# Patient Record
Sex: MALE | Race: BLACK OR AFRICAN AMERICAN | NOT HISPANIC OR LATINO | Employment: OTHER | URBAN - METROPOLITAN AREA
[De-identification: names, ages, dates, MRNs, and addresses within clinical notes are randomized per-mention and may not be internally consistent; named-entity substitution may affect disease eponyms.]

---

## 2017-01-05 ENCOUNTER — HOSPITAL ENCOUNTER (EMERGENCY)
Facility: OTHER | Age: 62
Discharge: HOME | End: 2017-01-05
Attending: EMERGENCY MEDICINE
Payer: COMMERCIAL

## 2017-01-05 VITALS
RESPIRATION RATE: 24 BRPM | DIASTOLIC BLOOD PRESSURE: 62 MMHG | BODY MASS INDEX: 47.56 KG/M2 | WEIGHT: 303 LBS | HEIGHT: 67 IN | TEMPERATURE: 97 F | HEART RATE: 102 BPM | SYSTOLIC BLOOD PRESSURE: 130 MMHG | OXYGEN SATURATION: 96 %

## 2017-01-05 DIAGNOSIS — R10.32 LLQ ABDOMINAL PAIN: ICD-10-CM

## 2017-01-05 DIAGNOSIS — K59.01 CONSTIPATION BY DELAYED COLONIC TRANSIT: ICD-10-CM

## 2017-01-05 DIAGNOSIS — R33.9 URINARY RETENTION: Primary | ICD-10-CM

## 2017-01-05 LAB
ANION GAP SERPL CALC-SCNC: 12 MMOL/L
ANISOCYTOSIS BLD QL SMEAR: SLIGHT
BASOPHILS # BLD AUTO: 0.02 K/UL
BASOPHILS NFR BLD: 0.2 %
BILIRUB UR QL STRIP: NEGATIVE
BUN SERPL-MCNC: 16 MG/DL
CALCIUM SERPL-MCNC: 9.9 MG/DL
CHLORIDE SERPL-SCNC: 109 MMOL/L
CLARITY UR: CLEAR
CO2 SERPL-SCNC: 19 MMOL/L
COLOR UR: YELLOW
CREAT SERPL-MCNC: 1.2 MG/DL
DIFFERENTIAL METHOD: ABNORMAL
EOSINOPHIL # BLD AUTO: 0.1 K/UL
EOSINOPHIL NFR BLD: 0.6 %
ERYTHROCYTE [DISTWIDTH] IN BLOOD BY AUTOMATED COUNT: 15.4 %
EST. GFR  (AFRICAN AMERICAN): >60 ML/MIN/1.73 M^2
EST. GFR  (NON AFRICAN AMERICAN): >60 ML/MIN/1.73 M^2
GIANT PLATELETS BLD QL SMEAR: PRESENT
GLUCOSE SERPL-MCNC: 74 MG/DL
GLUCOSE UR QL STRIP: NEGATIVE
HCT VFR BLD AUTO: 35.2 %
HGB BLD-MCNC: 11.3 G/DL
HGB UR QL STRIP: NEGATIVE
KETONES UR QL STRIP: NEGATIVE
LEUKOCYTE ESTERASE UR QL STRIP: NEGATIVE
LYMPHOCYTES # BLD AUTO: 1 K/UL
LYMPHOCYTES NFR BLD: 8.7 %
MCH RBC QN AUTO: 27 PG
MCHC RBC AUTO-ENTMCNC: 32.1 %
MCV RBC AUTO: 84 FL
MONOCYTES # BLD AUTO: 0.7 K/UL
MONOCYTES NFR BLD: 6.5 %
NEUTROPHILS # BLD AUTO: 9.1 K/UL
NEUTROPHILS NFR BLD: 84 %
NITRITE UR QL STRIP: NEGATIVE
PH UR STRIP: 6 [PH] (ref 5–8)
PLATELET # BLD AUTO: 358 K/UL
PLATELET BLD QL SMEAR: ABNORMAL
PMV BLD AUTO: 9.2 FL
POLYCHROMASIA BLD QL SMEAR: ABNORMAL
POTASSIUM SERPL-SCNC: 4.5 MMOL/L
PROT UR QL STRIP: NEGATIVE
RBC # BLD AUTO: 4.18 M/UL
SODIUM SERPL-SCNC: 140 MMOL/L
SP GR UR STRIP: 1.02 (ref 1–1.03)
URN SPEC COLLECT METH UR: NORMAL
UROBILINOGEN UR STRIP-ACNC: NEGATIVE EU/DL
WBC # BLD AUTO: 10.89 K/UL

## 2017-01-05 PROCEDURE — 85007 BL SMEAR W/DIFF WBC COUNT: CPT

## 2017-01-05 PROCEDURE — 99284 EMERGENCY DEPT VISIT MOD MDM: CPT | Mod: 25

## 2017-01-05 PROCEDURE — 51702 INSERT TEMP BLADDER CATH: CPT

## 2017-01-05 PROCEDURE — 81003 URINALYSIS AUTO W/O SCOPE: CPT

## 2017-01-05 PROCEDURE — 80048 BASIC METABOLIC PNL TOTAL CA: CPT

## 2017-01-05 PROCEDURE — 85027 COMPLETE CBC AUTOMATED: CPT

## 2017-01-05 RX ORDER — DIAZEPAM 10 MG/1
10 TABLET ORAL EVERY 6 HOURS PRN
COMMUNITY
End: 2017-05-31

## 2017-01-05 RX ORDER — AMLODIPINE BESYLATE 10 MG/1
10 TABLET ORAL DAILY
COMMUNITY
End: 2021-05-13

## 2017-01-05 RX ORDER — DOCUSATE SODIUM 100 MG/1
100 CAPSULE, LIQUID FILLED ORAL 2 TIMES DAILY PRN
COMMUNITY
End: 2017-01-05

## 2017-01-05 RX ORDER — DOCUSATE SODIUM 100 MG/1
100 CAPSULE, LIQUID FILLED ORAL 2 TIMES DAILY PRN
Qty: 60 CAPSULE | Refills: 0 | Status: SHIPPED | OUTPATIENT
Start: 2017-01-05 | End: 2021-05-13

## 2017-01-05 RX ORDER — POLYETHYLENE GLYCOL 3350 17 G/17G
17 POWDER, FOR SOLUTION ORAL DAILY
Qty: 119 G | Refills: 0 | Status: SHIPPED | OUTPATIENT
Start: 2017-01-05 | End: 2017-05-31

## 2017-01-05 RX ORDER — ALLOPURINOL 300 MG/1
300 TABLET ORAL DAILY
COMMUNITY
End: 2021-06-03 | Stop reason: SDUPTHER

## 2017-01-05 RX ORDER — METFORMIN HYDROCHLORIDE 500 MG/1
500 TABLET ORAL 2 TIMES DAILY WITH MEALS
COMMUNITY
End: 2022-02-08

## 2017-01-05 NOTE — DISCHARGE INSTRUCTIONS
Constipation (Adult)  Constipation means that you have bowel movements that are less frequent than usual. Stools often become very hard and difficult to pass.  Constipation is very common. At some point in life it affects almost everyone. Since everyone's bowel habits are different, what is constipation to one person may not be to another. Your healthcare provider may do tests to diagnose constipation. It depends on what he or she finds when evaluating you.    Symptoms of constipation include:  · Abdominal pain  · Bloating  · Vomiting  · Painful bowel movements  · Itching, swelling, bleeding, or pain around the anus  Causes  Constipation can have many causes. These include:  · Diet low in fiber  · Too much dairy  · Not drinking enough liquids  · Lack of exercise or physical activity. This is especially true for older adults.  · Changes in lifestyle or daily routine, including pregnancy, aging, work, and travel  · Frequent use or misuse of laxatives  · Ignoring the urge to have a bowel movement or delaying it until later  · Medicines, such as certain prescription pain medicines, iron supplements, antacids, certain antidepressants, and calcium supplements  · Diseases like irritable bowel syndrome, bowel obstructions, stroke, diabetes, thyroid disease, Parkinson disease, hemorrhoids, and colon cancer  Complications  Potential complications of constipation can include:  · Hemorrhoids  · Rectal bleeding from hemorrhoids or anal fissures (skin tears)  · Hernias  · Dependency on laxatives  · Chronic constipation  · Fecal impaction  · Bowel obstruction or perforation  Home care  All treatment should be done after talking with your healthcare provider. This is especially true if you have another medical problems, are taking prescription medicines, or are an older adult. Treatment most often involves lifestyle changes. You may also need medicines. Your healthcare provider will tell you which will work best for you. Follow  the advice below to help avoid this problem in the future.  Lifestyle changes  These lifestyle changes can help prevent constipation:  · Diet. Eat a high-fiber diet, with fresh fruit and vegetables, and reduce dairy intake, meats, and processed foods  · Fluids. It's important to get enough fluids each day. Drink plenty of water when you eat more fiber. If you are on diet that limits the amount of fluid you can have, talk about this with your healthcare provider.  · Regular exercise. Check with your healthcare provider first.  Medications  Take any medicines as directed. Some laxatives are safe to use only every now and then. Others can be taken on a regular basis. Talk with your doctor or pharmacist if you have questions.  Prescription pain medicines can cause constipation. If you are taking this kind of medicine, ask your healthcare provider if you should also take a stool softener.  Medicines you may take to treat constipation include:  · Fiber supplements  · Stool softeners  · Laxatives  · Enemas  · Rectal suppositories  Follow-up care  Follow up with your healthcare provider if symptoms don't get better in the next few days. You may need to have more tests or see a specialist.  Call 911  Call 911 if any of these occur:  · Trouble breathing  · Stiff, rigid abdomen that is severely painful to touch  · Confusion  · Fainting or loss of consciousness  · Rapid heart rate  · Chest pain  When to seek medical advice  Call your healthcare provider right away if any of these occur:  · Fever over 100.4°F (38°C)  · Failure to resume normal bowel movements  · Pain in your abdomen or back gets worse  · Nausea or vomiting  · Swelling in your abdomen  · Blood in the stool  · Black, tarry stool  · Involuntary weight loss  · Weakness  © 2250-9956 [employer]. [address] [place] [address], [place] [address]. All rights reserved. This information is not intended as a substitute for professional medical care. Always follow  your healthcare professional's instructions.          Treating Constipation  Constipation is a common and often uncomfortable problem. Constipation means you have bowel movements fewer than 3 times per week, or strain to pass hard, dry stool. It can last a short time. Or it can be a problem that never seems to go away. The good news is that it can often be treated and controlled.    Eat more fiber  One of the best ways to help treat constipation is to increase your fiber intake. You can do this either through diet or by using fiber supplements. Fiber (in whole grains, fruits, and vegetables) adds bulk and absorbs water to soften the stool. This helps the stool pass through the colon more easily. When you increase your fiber intake, do it slowly to avoid side effects such as bloating. Also increase the amount of water that you drink. Eating more of the following foods can add fiber to your diet.  · High-fiber cereals  · Whole grains, bran, and brown rice  · Vegetables such as carrots, broccoli, and greens  · Fresh fruits (especially apples, pears, and dried fruits like raisins and apricots)  · Nuts and legumes (especially beans such as lentils, kidney beans, and lima beans)  Get physically active  Exercise helps improve the working of your colon which helps ease constipation. Try to get some physical activity every day. If you havent been active for a while, talk to your healthcare provider before starting again.  Laxatives  Your healthcare provider may suggest an over-the-counter product to help ease your constipation. He or she may suggest the use of bulk-forming agents or laxatives. The use of laxatives, if used as directed, is common and safe. Follow directions carefully when using them. See your healthcare provider for new-onset constipation, or long-term constipation, to rule out other causes such as medicines or thyroid disease.  © 1035-5183 [employer]. [address] [place] [address], [place]  [address]. All rights reserved. This information is not intended as a substitute for professional medical care. Always follow your healthcare professional's instructions.          Urinary Retention (Male)  Urinary retention is the medical term for difficulty or inability to pass urine, even though your bladder is full.  Causes  The most common cause of urinary retention in men is the bladder outlet being blocked. This can be due to an enlarged prostate gland or a bladder infection. Certain medicines can also cause this problem. This condition is more likely to occur as men get older.    This condition is treated by insertion of a catheter into the bladder to drain the urine. This provides immediate relief. The catheter may need to remain in place for a few days to prevent a recurrence. The catheter has a balloon on the tip which was inflated after insertion. This prevents the catheter from falling out.  Symptoms  Common symptoms of urinary retention include:  · Pain (not experienced by everyone)  · Frequent urination  · Feeling that the bladder is still full after urinating  · Incontinence (not being able to control the release of urine)  · Swollen abdomen  Treatment  This condition is treated by inserting a tube (catheter) into the bladder to drain the urine. This provides immediate relief. The catheter may need to stay in place for a few days. The catheter has a balloon on the tip, which is inflated after insertion. This prevents the catheter from falling out.  Home care  · If you were given antibiotics, take them until they are used up, or your healthcare provider tells you to stop. It is important to finish the antibiotics even though you feel better. This is to make sure your infection has cleared.  · If a catheter was left in place, it is important to keep bacteria from getting into the collection bag. Do not disconnect the catheter from the collection bag.  · Use a leg band to secure the drainage tube, so it does  not pull on the catheter. Drain the collection bag when it becomes full using the drain spout at the bottom of the bag.  · Do not pull on or try to remove your catheter. This will injure your urethra. The catheter must be removed by a healthcare provider.  Follow-up care  Follow up with your healthcare provider, or as advised.  If a catheter was left in place, it can usually be removed within 3 to 7 days. Some conditions require the catheter to stay in longer. Your healthcare provider will tell you when to return to have the catheter removed.  When to seek medical advice  Call your healthcare provider right away if any of these occur:  · Fever of 100.4ºF (38ºC) or higher, or as directed by your healthcare provider  · Bladder or lower-abdominal pain or fullness  · Abdominal swelling, nausea, vomiting, or back pain  · Blood or urine leakage around the catheter  · Bloody urine coming from the catheter (if a new symptom)  · Weakness, dizziness, or fainting  · Confusion or change in usual level of alertness  · If a catheter was left in place, return if:  ¨ Catheter falls out  ¨ Catheter stops draining for 6 hours  © 3868-7956 [employer], [employer]. [address] [place] [address]. All rights reserved. This information is not intended as a substitute for professional medical care. Always follow your healthcare professional's instructions.

## 2017-01-05 NOTE — ED NOTES
PT presents to ED c/o LLQ abdominal pain x 2-3 weeks. Pt has back surgery mid- December 2016 [place]. Pt has been having constipation and bladder distention as well. Pt last BM was yesterday. Last void was 10AM this AM but pt states he does not feel like he emptied his bladder.  Pt has trouble walking or moving independently due to abdominal pain. Denies NVD, SOB, or CP. Pt AAOx4 and appropriate at this time. Respirations even and unlabored. No acute distress noted. Pt reports pain 10/10. MD notified. Awaiting further orders. Pt updated on POC. Bed is locked and in lowest position with side rails up x2. Call bell within reach and pt oriented to use of call bell. Pt on continuous cardiac monitoring, continuous pulse ox, and continuous BP cuff. Will continue to monitor.

## 2017-01-05 NOTE — ED NOTES
Leg bag applied to pt. Pt and family instructed on how to care for catheter. Pt tolerating leg bag well.

## 2017-01-05 NOTE — ED AVS SNAPSHOT
[facility]  [address]  [facility] [address]               Jono Gonzalez   2017 12:01 PM   ED    Description:  Male : 1955   Department:  [facility]           Your Care was Coordinated By:     Provider Role From To    Dany Steven II, MD Attending Provider 17 1222 --      Reason for Visit     Abdominal Pain           Diagnoses this Visit        Comments    Urinary retention    -  Primary     LLQ abdominal pain         Constipation by delayed colonic transit           ED Disposition     None           To Do List           Follow-up Information     Follow up with Thomas Wei MD In 3 days.    Specialty:  Internal Medicine    Contact information:    [address] [facility]  [address]  [facility] [id number] [phone]          Follow up with Octavio Martinez MD. Schedule an appointment as soon as possible for a visit in 3 days.    Specialty:  Urology    Contact information:    [address] [facility]  [address]  [facility] [address]  [phone]         These Medications        Disp Refills Start End    docusate sodium (COLACE) 100 MG capsule 60 capsule 0 2017     Take 1 capsule (100 mg total) by mouth 2 (two) times daily as needed for Constipation. - Oral    Pharmacy: [facility] [address] Ph #: [phone]       polyethylene glycol (GLYCOLAX) 17 gram/dose powder 119 g 0 2017     Take 17 g by mouth once daily. - Oral    Pharmacy: [facility] [address] AT [facility] Ph #: [phone]         [facility]     [facility] -  Assistance  Registered nurses in the [facility] provide clinical advisement, health education, appointment booking, and other advisory services.  Call for this free service at [phone].             Medications           Message  regarding Medications     Verify the changes and/or additions to your medication regime listed below are the same as discussed with your clinician today.  If any of these changes or additions are incorrect, please notify your healthcare provider.        START taking these NEW medications        Refills    docusate sodium (COLACE) 100 MG capsule 0    Sig: Take 1 capsule (100 mg total) by mouth 2 (two) times daily as needed for Constipation.    Class: Print    Route: Oral    polyethylene glycol (GLYCOLAX) 17 gram/dose powder 0    Sig: Take 17 g by mouth once daily.    Class: Print    Route: Oral      STOP taking these medications     ibuprofen (ADVIL,MOTRIN) 800 MG tablet Take 1 tablet (800 mg total) by mouth every 6 (six) hours as needed for Pain.    benazepril (LOTENSIN) 40 MG tablet Take 40 mg by mouth once daily.    levocetirizine (XYZAL) 5 MG tablet Take 5 mg by mouth every evening.    meloxicam (MOBIC) 7.5 MG tablet Take 7.5 mg by mouth once daily.    colchicine 0.6 mg tablet Take 1 tablet (0.6 mg total) by mouth once daily.           Verify that the below list of medications is an accurate representation of the medications you are currently taking.  If none reported, the list may be blank. If incorrect, please contact your healthcare provider. Carry this list with you in case of emergency.           Current Medications     allopurinol (ZYLOPRIM) 300 MG tablet Take 300 mg by mouth once daily.    amlodipine (NORVASC) 10 MG tablet Take 10 mg by mouth once daily.    diazePAM (VALIUM) 10 MG Tab Take 10 mg by mouth every 6 (six) hours as needed (for anxiety).     furosemide (LASIX) 40 MG tablet Take 40 mg by mouth daily as needed (for fluid retention or swelling).     metformin (GLUCOPHAGE) 500 MG tablet Take 500 mg by mouth 2 (two) times daily with meals.    olmesartan (BENICAR) 40 MG tablet Take 40 mg by mouth once daily.    oxycodone-acetaminophen (PERCOCET) 5-325 mg per tablet Take 1 tablet by mouth every 6 (six)  "hours as needed for Pain.    docusate sodium (COLACE) 100 MG capsule Take 1 capsule (100 mg total) by mouth 2 (two) times daily as needed for Constipation.    polyethylene glycol (GLYCOLAX) 17 gram/dose powder Take 17 g by mouth once daily.           Clinical Reference Information           Your Vitals Were     BP Pulse Temp Resp Height Weight    142/75 102 97.2 °F (36.2 °C) (Oral) 29 5' 7" (1.702 m) 137.4 kg (303 lb)    SpO2 BMI             95% 47.46 kg/m2         Allergies as of 1/5/2017     No Known Allergies      Immunizations Administered on Date of Encounter - 1/5/2017     None      ED Micro, Lab, POCT     Start Ordered       Status Ordering Provider    01/05/17 1528 01/05/17 1527  Basic metabolic panel  STAT      Final result     01/05/17 1528 01/05/17 1527  CBC auto differential  STAT      Final result     01/05/17 1308 01/05/17 1307  Urinalysis - Cath.  Once      Final result       ED Imaging Orders     Start Ordered       Status Ordering Provider    01/05/17 1307 01/05/17 1307  X-Ray Abdomen Flat And Erect  1 time imaging      Final result         Discharge Instructions         Constipation (Adult)  Constipation means that you have bowel movements that are less frequent than usual. Stools often become very hard and difficult to pass.  Constipation is very common. At some point in life it affects almost everyone. Since everyone's bowel habits are different, what is constipation to one person may not be to another. Your healthcare provider may do tests to diagnose constipation. It depends on what he or she finds when evaluating you.    Symptoms of constipation include:  · Abdominal pain  · Bloating  · Vomiting  · Painful bowel movements  · Itching, swelling, bleeding, or pain around the anus  Causes  Constipation can have many causes. These include:  · Diet low in fiber  · Too much dairy  · Not drinking enough liquids  · Lack of exercise or physical activity. This is especially true for older " adults.  · Changes in lifestyle or daily routine, including pregnancy, aging, work, and travel  · Frequent use or misuse of laxatives  · Ignoring the urge to have a bowel movement or delaying it until later  · Medicines, such as certain prescription pain medicines, iron supplements, antacids, certain antidepressants, and calcium supplements  · Diseases like irritable bowel syndrome, bowel obstructions, stroke, diabetes, thyroid disease, Parkinson disease, hemorrhoids, and colon cancer  Complications  Potential complications of constipation can include:  · Hemorrhoids  · Rectal bleeding from hemorrhoids or anal fissures (skin tears)  · Hernias  · Dependency on laxatives  · Chronic constipation  · Fecal impaction  · Bowel obstruction or perforation  Home care  All treatment should be done after talking with your healthcare provider. This is especially true if you have another medical problems, are taking prescription medicines, or are an older adult. Treatment most often involves lifestyle changes. You may also need medicines. Your healthcare provider will tell you which will work best for you. Follow the advice below to help avoid this problem in the future.  Lifestyle changes  These lifestyle changes can help prevent constipation:  · Diet. Eat a high-fiber diet, with fresh fruit and vegetables, and reduce dairy intake, meats, and processed foods  · Fluids. It's important to get enough fluids each day. Drink plenty of water when you eat more fiber. If you are on diet that limits the amount of fluid you can have, talk about this with your healthcare provider.  · Regular exercise. Check with your healthcare provider first.  Medications  Take any medicines as directed. Some laxatives are safe to use only every now and then. Others can be taken on a regular basis. Talk with your doctor or pharmacist if you have questions.  Prescription pain medicines can cause constipation. If you are taking this kind of medicine, ask  your healthcare provider if you should also take a stool softener.  Medicines you may take to treat constipation include:  · Fiber supplements  · Stool softeners  · Laxatives  · Enemas  · Rectal suppositories  Follow-up care  Follow up with your healthcare provider if symptoms don't get better in the next few days. You may need to have more tests or see a specialist.  Call 911  Call 911 if any of these occur:  · Trouble breathing  · Stiff, rigid abdomen that is severely painful to touch  · Confusion  · Fainting or loss of consciousness  · Rapid heart rate  · Chest pain  When to seek medical advice  Call your healthcare provider right away if any of these occur:  · Fever over 100.4°F (38°C)  · Failure to resume normal bowel movements  · Pain in your abdomen or back gets worse  · Nausea or vomiting  · Swelling in your abdomen  · Blood in the stool  · Black, tarry stool  · Involuntary weight loss  · Weakness  © 5558-9655 [employer]. [address] [place] [address], [place] [address]. All rights reserved. This information is not intended as a substitute for professional medical care. Always follow your healthcare professional's instructions.          Treating Constipation  Constipation is a common and often uncomfortable problem. Constipation means you have bowel movements fewer than 3 times per week, or strain to pass hard, dry stool. It can last a short time. Or it can be a problem that never seems to go away. The good news is that it can often be treated and controlled.    Eat more fiber  One of the best ways to help treat constipation is to increase your fiber intake. You can do this either through diet or by using fiber supplements. Fiber (in whole grains, fruits, and vegetables) adds bulk and absorbs water to soften the stool. This helps the stool pass through the colon more easily. When you increase your fiber intake, do it slowly to avoid side effects such as bloating. Also increase the amount of water  that you drink. Eating more of the following foods can add fiber to your diet.  · High-fiber cereals  · Whole grains, bran, and brown rice  · Vegetables such as carrots, broccoli, and greens  · Fresh fruits (especially apples, pears, and dried fruits like raisins and apricots)  · Nuts and legumes (especially beans such as lentils, kidney beans, and lima beans)  Get physically active  Exercise helps improve the working of your colon which helps ease constipation. Try to get some physical activity every day. If you havent been active for a while, talk to your healthcare provider before starting again.  Laxatives  Your healthcare provider may suggest an over-the-counter product to help ease your constipation. He or she may suggest the use of bulk-forming agents or laxatives. The use of laxatives, if used as directed, is common and safe. Follow directions carefully when using them. See your healthcare provider for new-onset constipation, or long-term constipation, to rule out other causes such as medicines or thyroid disease.  © 6634-8457 [employer]. [address] [place] [address]. All rights reserved. This information is not intended as a substitute for professional medical care. Always follow your healthcare professional's instructions.          Urinary Retention (Male)  Urinary retention is the medical term for difficulty or inability to pass urine, even though your bladder is full.  Causes  The most common cause of urinary retention in men is the bladder outlet being blocked. This can be due to an enlarged prostate gland or a bladder infection. Certain medicines can also cause this problem. This condition is more likely to occur as men get older.    This condition is treated by insertion of a catheter into the bladder to drain the urine. This provides immediate relief. The catheter may need to remain in place for a few days to prevent a recurrence. The catheter has a balloon on the tip  which was inflated after insertion. This prevents the catheter from falling out.  Symptoms  Common symptoms of urinary retention include:  · Pain (not experienced by everyone)  · Frequent urination  · Feeling that the bladder is still full after urinating  · Incontinence (not being able to control the release of urine)  · Swollen abdomen  Treatment  This condition is treated by inserting a tube (catheter) into the bladder to drain the urine. This provides immediate relief. The catheter may need to stay in place for a few days. The catheter has a balloon on the tip, which is inflated after insertion. This prevents the catheter from falling out.  Home care  · If you were given antibiotics, take them until they are used up, or your healthcare provider tells you to stop. It is important to finish the antibiotics even though you feel better. This is to make sure your infection has cleared.  · If a catheter was left in place, it is important to keep bacteria from getting into the collection bag. Do not disconnect the catheter from the collection bag.  · Use a leg band to secure the drainage tube, so it does not pull on the catheter. Drain the collection bag when it becomes full using the drain spout at the bottom of the bag.  · Do not pull on or try to remove your catheter. This will injure your urethra. The catheter must be removed by a healthcare provider.  Follow-up care  Follow up with your healthcare provider, or as advised.  If a catheter was left in place, it can usually be removed within 3 to 7 days. Some conditions require the catheter to stay in longer. Your healthcare provider will tell you when to return to have the catheter removed.  When to seek medical advice  Call your healthcare provider right away if any of these occur:  · Fever of 100.4ºF (38ºC) or higher, or as directed by your healthcare provider  · Bladder or lower-abdominal pain or fullness  · Abdominal swelling, nausea, vomiting, or back  pain  · Blood or urine leakage around the catheter  · Bloody urine coming from the catheter (if a new symptom)  · Weakness, dizziness, or fainting  · Confusion or change in usual level of alertness  · If a catheter was left in place, return if:  ¨ Catheter falls out  ¨ Catheter stops draining for 6 hours  © 6675-5495 [employer]. [address] [place] [address]. All rights reserved. This information is not intended as a substitute for professional medical care. Always follow your healthcare professional's instructions.           [facility] complies with applicable Federal civil rights laws and does not discriminate on the basis of race, color, national origin, age, disability, or sex.        Language Assistance Services     ATTENTION: Language assistance services are available, free of charge. Please call [phone].      ATENCIÓN: Si habla español, tiene a payne disposición servicios gratuitos de asistencia lingüística. Llame al [phone].     CHÚ Ý: N?u b?n nói Ti?ng Vi?t, có các d?ch v? h? tr? ngôn ng? mi?n phí dành cho b?n. G?i s? [phone].

## 2017-01-05 NOTE — ED PROVIDER NOTES
Encounter Date: 1/5/2017    SCRIBE #1 NOTE: I, Mateus Reinaldo, am scribing for, and in the presence of,  Dr. Steven. I have scribed the entire note.       History     Chief Complaint   Patient presents with    Abdominal Pain     LLQ abd pain starting after back sx on 12/14. last BM yesterday. reports needing enemas and laxatives since sx. pain worse with deep breaths.     Review of patient's allergies indicates:  No Known Allergies  HPI Comments: Time seen by provider: 12:55 PM    This is a 62 y.o. male with a PMHx of HTN and diabetes mellitus who presents with complaint of abdominal pain which began atraumatically two weeks ago. He describes his abdominal pain as being constant in his LLQ. His wife notes that he underwent a CT of his abdomen 17 days ago which did no reveal anything. He denies any symptoms of nausea, vomiting, blood in stool, constipation, or shortness of breath.    The history is provided by the patient and the spouse.     Past Medical History   Diagnosis Date    Diabetes mellitus     Gout attack     Hypertension      No past medical history pertinent negatives.  Past Surgical History   Procedure Laterality Date    Lumbar fusion       X 2    Back surgery       No family history on file.  Social History   Substance Use Topics    Smoking status: Never Smoker    Smokeless tobacco: None    Alcohol use No     Review of Systems   Constitutional: Negative for chills and fever.   HENT: Negative for congestion and facial swelling.    Respiratory: Negative for cough, chest tightness and shortness of breath.    Cardiovascular: Negative for chest pain.   Gastrointestinal: Positive for abdominal pain. Negative for blood in stool, constipation, diarrhea, nausea and vomiting.   Endocrine: Negative for polyuria.   Genitourinary: Negative for decreased urine volume, difficulty urinating, discharge, dysuria, hematuria, penile pain and penile swelling.   Musculoskeletal: Negative for back pain, myalgias and  neck pain.   Skin: Negative for rash.   Neurological: Negative for headaches.       Physical Exam   Initial Vitals   BP Pulse Resp Temp SpO2   01/05/17 1142 01/05/17 1142 01/05/17 1142 01/05/17 1142 01/05/17 1142   117/58 117 18 97.2 °F (36.2 °C) 94 %     Physical Exam    Nursing note and vitals reviewed.  Constitutional: He appears well-developed and well-nourished. He is not diaphoretic. No distress.   Patient is obese and uncomfortable appearing.   HENT:   Head: Normocephalic and atraumatic.   Right Ear: External ear normal.   Left Ear: External ear normal.   Mouth/Throat: Oropharynx is clear and moist.   Eyes: Conjunctivae and EOM are normal. Pupils are equal, round, and reactive to light. Right eye exhibits no discharge. Left eye exhibits no discharge.   Neck: Normal range of motion.   Cardiovascular: Normal rate, regular rhythm, normal heart sounds and intact distal pulses. Exam reveals no gallop and no friction rub.    No murmur heard.  Pulmonary/Chest: Breath sounds normal. No respiratory distress. He has no wheezes. He has no rhonchi. He has no rales.   Abdominal: Soft. There is tenderness in the suprapubic area and left lower quadrant. There is no rebound and no guarding.   Mild tenderness to the suprapubic region and LLQ. Bladder distention difficult to assess due to body habitus.   Musculoskeletal: Normal range of motion. He exhibits no edema or tenderness.   Neurological: He is alert and oriented to person, place, and time.   Skin: Skin is warm and dry. No rash and no abscess noted. No erythema. No pallor.   Psychiatric: He has a normal mood and affect. His behavior is normal. Judgment and thought content normal.         ED Course   Procedures  Labs Reviewed   BASIC METABOLIC PANEL - Abnormal; Notable for the following:        Result Value    CO2 19 (*)     All other components within normal limits   CBC W/ AUTO DIFFERENTIAL - Abnormal; Notable for the following:     RBC 4.18 (*)     Hemoglobin 11.3 (*)      Hematocrit 35.2 (*)     RDW 15.4 (*)     Platelets 358 (*)     Gran # 9.1 (*)     Gran% 84.0 (*)     Lymph% 8.7 (*)     All other components within normal limits   URINALYSIS          X-Rays:   Independently Interpreted Readings:   Abdomen: X-Ray Abdomen Flat And Erect 2:40 PM: Contrast and moderate stool throughout the colon. No air/fluid levels. No free air under the diaphragm.     Imaging Results         X-Ray Abdomen Flat And Erect (Final result) Result time:  01/05/17 14:15:47    Final result by Tory Esteban MD (01/05/17 14:15:47)    Impression:      No acute abnormality.  Contrast throughout the distal colon of uncertain etiology.  Patient has likely had recent examination requiring enteric contrast and comparison to such outside imaging is recommended.      Electronically signed by: TORY ESTEBAN  Date:     01/05/17  Time:    14:15     Narrative:    CLINICAL HISTORY:  abd pain    TECHNIQUE: Frontal supine and erect radiographs of the abdomen    COMPARISON: CT renal stone study 09/16/14.      FINDINGS:  Support devices: Lumbosacral posterior fusion hardware is in satisfactory position.  No hardware fracture or evidence of loosening.    Abdomen: The right lateral margin of the abdomen is excluded from the field-of-view related to patient body habitus.  Nonobstructive bowel gas pattern. No free air or portal venous gas. Contrast is present throughout the distal colon of uncertain etiology.    Chest: Lung bases are clear.    Other: N/A.              Medical Decision Making:   Initial Assessment:   3:35 PM: After spontaneous voiding of 200 cc of urine lu catheter was placed and ultimately returned greater than a liter of urine. Patient reports feeling better.    4:38 PM: Patient has been counseled on a high fiber diet and fluids as well as use of stool softeners and PRN laxatives.  Clinical Tests:   Radiological Study: Reviewed and Ordered    Additional MDM:   X-Rays: I have independently interpreted X-Ray(s)  - see notes.          Scribe Attestation:   Scribe #1: I performed the above scribed service and the documentation accurately describes the services I performed. I attest to the accuracy of the note.    Attending Attestation:           Physician Attestation for Scribe:  Physician Attestation Statement for Scribe #1: I, Dr. Steven, reviewed documentation, as scribed by Mateus Najera in my presence, and it is both accurate and complete.          patient presents with pain in the lower abdomen, greater on the left for the past week or so.  He had recent lumbar spine surgery, saw his surgeon at follow-up at that time he was also complaining of pain and a CAT scan of the abdomen performed the patient reports was negative although that report is not available to me.  He also had been expressing constipation but his wife has been giving him prune juice and stool softeners and she reports he had a good bowel movement, regular movement since.  However on his abdominal x-ray he has stool throughout the colon including still visualized contrast from 10 days ago.  Stressed the need to continue fluids, high-fiber diet stool softeners as well as when necessary MiraLAX.  The pain however did not seem to be from constipation and he was also describing difficulty urinating.  A bladder scan showed 700 cc of urine he was only able to spontaneously void 200 and therefore Browne catheter was placed.  This ultimately produced greater than a liter of urine.  Laboratory studies show mild anemia.  Creatinine of 1.2 his last one in our system was 0.8 in 2014.  No evidence of UTI.  Patient be discharged with an indwelling Browne catheter due to the significant urinary retention and have patient follow-up with urology in addition to his surgeon        ED Course     Clinical Impression:     1. Urinary retention    2. LLQ abdominal pain    3. Constipation by delayed colonic transit                Dany Steven II, MD  01/06/17 3564

## 2019-12-03 ENCOUNTER — OFFICE VISIT (OUTPATIENT)
Dept: OTOLARYNGOLOGY | Facility: CLINIC | Age: 64
End: 2019-12-03
Payer: MEDICARE

## 2019-12-03 ENCOUNTER — CLINICAL SUPPORT (OUTPATIENT)
Dept: AUDIOLOGY | Facility: CLINIC | Age: 64
End: 2019-12-03
Payer: COMMERCIAL

## 2019-12-03 VITALS
HEART RATE: 89 BPM | DIASTOLIC BLOOD PRESSURE: 75 MMHG | SYSTOLIC BLOOD PRESSURE: 125 MMHG | WEIGHT: 288.81 LBS | BODY MASS INDEX: 45.23 KG/M2

## 2019-12-03 DIAGNOSIS — H90.3 SENSORINEURAL HEARING LOSS, BILATERAL: Primary | ICD-10-CM

## 2019-12-03 DIAGNOSIS — H90.3 SENSORINEURAL HEARING LOSS (SNHL) OF BOTH EARS: Primary | ICD-10-CM

## 2019-12-03 PROCEDURE — 99999 PR PBB SHADOW E&M-EST. PATIENT-LVL I: CPT | Mod: PBBFAC,,, | Performed by: AUDIOLOGIST

## 2019-12-03 PROCEDURE — 99999 PR PBB SHADOW E&M-EST. PATIENT-LVL II: CPT | Mod: PBBFAC,,, | Performed by: OTOLARYNGOLOGY

## 2019-12-03 PROCEDURE — 92567 TYMPANOMETRY: CPT | Mod: PBBFAC | Performed by: AUDIOLOGIST

## 2019-12-03 PROCEDURE — 99999 PR PBB SHADOW E&M-EST. PATIENT-LVL II: ICD-10-PCS | Mod: PBBFAC,,, | Performed by: OTOLARYNGOLOGY

## 2019-12-03 PROCEDURE — 1159F PR MEDICATION LIST DOCUMENTED IN MEDICAL RECORD: ICD-10-PCS | Mod: S$GLB,,, | Performed by: OTOLARYNGOLOGY

## 2019-12-03 PROCEDURE — 99203 OFFICE O/P NEW LOW 30 MIN: CPT | Mod: S$GLB,,, | Performed by: OTOLARYNGOLOGY

## 2019-12-03 PROCEDURE — 92557 COMPREHENSIVE HEARING TEST: CPT | Mod: PBBFAC | Performed by: AUDIOLOGIST

## 2019-12-03 PROCEDURE — 1159F MED LIST DOCD IN RCRD: CPT | Mod: S$GLB,,, | Performed by: OTOLARYNGOLOGY

## 2019-12-03 PROCEDURE — 99999 PR PBB SHADOW E&M-EST. PATIENT-LVL I: ICD-10-PCS | Mod: PBBFAC,,, | Performed by: AUDIOLOGIST

## 2019-12-03 PROCEDURE — 99203 PR OFFICE/OUTPT VISIT, NEW, LEVL III, 30-44 MIN: ICD-10-PCS | Mod: S$GLB,,, | Performed by: OTOLARYNGOLOGY

## 2019-12-03 NOTE — PROGRESS NOTES
Subjective:       Patient ID: Jono Gonzalez is a 64 y.o. male.    Chief Complaint: Hearing Loss    Hearing Loss:   Chronicity:  New  Onset:  More than 1 year ago  Progression since onset:  Gradually worsening  Frequency:  Constantly  Pain scale:  0/10  Severity:  Moderate  Hearing loss characteristics:  Moderate, muffled, trouble hearing TV and worse background noiseNo dizziness, no ear pain, no fever, no headaches and no rhinorrhea.  Aggravated by:  Noise  Treatments tried:  NothingNo dizziness.    Review of Systems   Constitutional: Negative for activity change, appetite change and fever.   HENT: Positive for hearing loss. Negative for congestion, ear discharge, ear pain, nosebleeds, postnasal drip, rhinorrhea and sneezing.    Eyes: Negative for redness and visual disturbance.   Respiratory: Negative for apnea, cough, shortness of breath and wheezing.    Cardiovascular: Negative for chest pain and palpitations.   Gastrointestinal: Negative for diarrhea and vomiting.   Genitourinary: Negative for difficulty urinating and frequency.   Musculoskeletal: Positive for arthralgias. Negative for back pain, gait problem and neck pain.   Skin: Negative for color change and rash.   Neurological: Negative for dizziness, speech difficulty, weakness and headaches.   Hematological: Negative for adenopathy. Does not bruise/bleed easily.   Psychiatric/Behavioral: Negative for agitation and behavioral problems.       Objective:        Constitutional:   Vital signs are normal. He appears well-developed and well-nourished. He is active. Normal speech.      Head:  Normocephalic and atraumatic. Salivary glands normal.  Facial strength is normal.      Ears:  Hearing normal to normal and whispered voice; external ear normal without scars, lesions, or masses; ear canal, tympanic membrane, and middle ear normal..     Nose:  Nose normal including turbinates, nasal mucosa, sinuses and nasal septum.     Mouth/Throat  Oropharynx clear and  moist without lesions or asymmetry and normal uvula midline.     Neck:  Neck normal without thyromegaly masses, asymmetry, normal tracheal structure, crepitus, and tenderness, thyroid normal, trachea normal, phonation normal and full range of motion with neck supple.     Psychiatric:   He has a normal mood and affect. His speech is normal and behavior is normal.     Neurological:   He has neurological normal, alert and oriented.     Skin:   No abrasions, lacerations, lesions, or rashes.         As a result of this patients history and examination findings, a comprehensive audiogram was ordered to determine the level of hearing/hearing loss.          Symmetric mid to HFSNL with poor discrimination scores and Type-A tympanograms.       Assessment:       1. Sensorineural hearing loss (SNHL) of both ears        Plan:       1. Hearing conservation strongly recommended.  2. Trial of amplification bilaterally also recommended.  3. Re-check of hearing in 18-24 months or sooner if subjective change noted.  4. F/U with PCP as per schedule.

## 2019-12-03 NOTE — PROGRESS NOTES
Jono Gonzalez was seen today in the clinic for an audiologic evaluation.  Patients main complaint was hearing loss.  Mr. Gonzalez reported that his wife complains that he asks for her to repeat frequently.    Tympanometry revealed Type A in the right ear and Type A in the left ear.  Audiogram results revealed normal sloping severe sensorineural hearing loss (SNHL) in the right ear and normal sloping to severe SNHL in the left ear.  Speech reception thresholds were noted at 40 dB in the right ear and 50 dB in the left ear.  Speech discrimination scores were 52% in the right ear and 44% in the left ear.    Recommendations:  1. Otologic evaluation  2. Annual audiogram  3. Noise protection when in noise  4. Hearing aid consultation

## 2019-12-03 NOTE — PATIENT INSTRUCTIONS
Understanding Hearing Loss    As you age, some hearing loss is normal. But long-term exposure to loud noise can speed up the loss. You lose more than the ability to hear how loud a sound is. You also lose the ability to hear certain types of sounds. For example, you might not be able to hear some of the high-pitched sounds of a child's voice.  Normal loss  Each of us is born with about 40,000 hair cells. They thin out naturally as we age. With the loss of hair cells comes hearing loss. This is called presbycusis. Most people don't notice normal hearing loss until their middle years. Others might not notice it until late in their lives. It's most often a slow and painless process.  Accelerated loss  Exposure to loud noise may cause brief hearing loss and ringing in your ears called tinnitus. If your exposure was short, you may recover. But long-term exposure day after day can affect your hearing for life.  Noise hurts more than your hearing  Did you know that loud noises can affect your whole body? Loud noises can:  · Raise blood pressure  · Disrupt sleep patterns  · Cause muscle strain  · Harm digestion   Date Last Reviewed: 3/31/2015  © 0814-8383 [employer]. [address] [place] [address]. All rights reserved. This information is not intended as a substitute for professional medical care. Always follow your healthcare professional's instructions.        How Hearing Aids Can Help You    If youre losing your hearing, it can be frustrating: But, hearing aids can help you hear what youve been missing. Not everyone who has hearing loss needs hearing aids. But if your hearing loss is keeping you from communicating with others, hearing aids will most likely help you.  What hearing aids do  After getting used to your new hearing aids, you may find that:  · You hear and understand speech better in many cases.  · Youre able to join in when talking with a group of people.  · You hear certain  speech sounds more clearly.  · You can hear warning signs that help you stay safe, such as a smoke alarm or car horn.  · Life is more enjoyable for you and the people around you.  How hearing aids help you hear  Hearing aids help by making most sounds clearer for the brain. Sounds you cant hear as well are made louder. Hearing aids also filter sound to reduce some background noise. And they soften some sounds that may be too loud. As a result, signals traveling to the brain are easier to understand.  The microphone picks up sound and carries it into the hearing aid. The amplifier makes the sound louder and clearer. The  sends this stronger sound into the ear canal. The stronger sound travels the rest of the way into the ear to the brain.    Setting realistic expectations  Advances in technology have made todays hearing aids better than ever. But your hearing still wont be perfect. You may not hear all sounds. And you wont hear only the things you want to. In noisy places you may still have trouble hearing speech clearly. Even so, you can learn techniques for better listening. Along with hearing aids, these techniques will help you understand whats happening around you much better.   Date Last Reviewed: 9/1/2016  © 4375-3502 [employer]. [address] [place] [address], [place] [address]. All rights reserved. This information is not intended as a substitute for professional medical care. Always follow your healthcare professional's instructions.

## 2019-12-03 NOTE — PROGRESS NOTES
Subjective:       Patient ID: Jono Gonzalez is a 64 y.o. male.    Chief Complaint: Hearing Loss    HPI  Review of Systems    Objective:      Physical Exam    Assessment:       1. Sensorineural hearing loss (SNHL) of both ears        Plan:

## 2021-04-16 ENCOUNTER — PATIENT MESSAGE (OUTPATIENT)
Dept: RESEARCH | Facility: HOSPITAL | Age: 66
End: 2021-04-16

## 2021-04-23 ENCOUNTER — TELEPHONE (OUTPATIENT)
Dept: CARDIOLOGY | Facility: CLINIC | Age: 66
End: 2021-04-23

## 2021-04-23 ENCOUNTER — OFFICE VISIT (OUTPATIENT)
Dept: CARDIOLOGY | Facility: CLINIC | Age: 66
End: 2021-04-23
Payer: MEDICARE

## 2021-04-23 VITALS
HEART RATE: 93 BPM | HEIGHT: 67 IN | WEIGHT: 315 LBS | DIASTOLIC BLOOD PRESSURE: 84 MMHG | BODY MASS INDEX: 49.44 KG/M2 | SYSTOLIC BLOOD PRESSURE: 134 MMHG | OXYGEN SATURATION: 97 %

## 2021-04-23 DIAGNOSIS — I45.10 RBBB: ICD-10-CM

## 2021-04-23 DIAGNOSIS — I10 BENIGN ESSENTIAL HYPERTENSION: ICD-10-CM

## 2021-04-23 DIAGNOSIS — E78.5 HYPERLIPIDEMIA, UNSPECIFIED HYPERLIPIDEMIA TYPE: Primary | ICD-10-CM

## 2021-04-23 DIAGNOSIS — G47.33 OSA (OBSTRUCTIVE SLEEP APNEA): ICD-10-CM

## 2021-04-23 DIAGNOSIS — I87.2 CHRONIC VENOUS INSUFFICIENCY: ICD-10-CM

## 2021-04-23 DIAGNOSIS — E11.42 TYPE 2 DIABETES MELLITUS WITH DIABETIC POLYNEUROPATHY, UNSPECIFIED WHETHER LONG TERM INSULIN USE: ICD-10-CM

## 2021-04-23 DIAGNOSIS — M10.9 GOUTY ARTHROPATHY: ICD-10-CM

## 2021-04-23 PROBLEM — N13.8 BPH WITH OBSTRUCTION/LOWER URINARY TRACT SYMPTOMS: Status: ACTIVE | Noted: 2018-10-11

## 2021-04-23 PROBLEM — N40.1 BPH WITH OBSTRUCTION/LOWER URINARY TRACT SYMPTOMS: Status: ACTIVE | Noted: 2018-10-11

## 2021-04-23 PROCEDURE — 99999 PR PBB SHADOW E&M-EST. PATIENT-LVL III: ICD-10-PCS | Mod: PBBFAC,,, | Performed by: INTERNAL MEDICINE

## 2021-04-23 PROCEDURE — 93010 ELECTROCARDIOGRAM REPORT: CPT | Mod: ,,, | Performed by: INTERNAL MEDICINE

## 2021-04-23 PROCEDURE — 99204 PR OFFICE/OUTPT VISIT, NEW, LEVL IV, 45-59 MIN: ICD-10-PCS | Mod: S$PBB,,, | Performed by: INTERNAL MEDICINE

## 2021-04-23 PROCEDURE — 93010 EKG 12-LEAD: ICD-10-PCS | Mod: ,,, | Performed by: INTERNAL MEDICINE

## 2021-04-23 PROCEDURE — 99999 PR PBB SHADOW E&M-EST. PATIENT-LVL III: CPT | Mod: PBBFAC,,, | Performed by: INTERNAL MEDICINE

## 2021-04-23 PROCEDURE — 99204 OFFICE O/P NEW MOD 45 MIN: CPT | Mod: S$PBB,,, | Performed by: INTERNAL MEDICINE

## 2021-04-23 PROCEDURE — 99213 OFFICE O/P EST LOW 20 MIN: CPT | Mod: PBBFAC,25 | Performed by: INTERNAL MEDICINE

## 2021-04-23 PROCEDURE — 93005 ELECTROCARDIOGRAM TRACING: CPT

## 2021-05-10 ENCOUNTER — TELEPHONE (OUTPATIENT)
Dept: CARDIOLOGY | Facility: CLINIC | Age: 66
End: 2021-05-10

## 2021-05-12 ENCOUNTER — HOSPITAL ENCOUNTER (OUTPATIENT)
Dept: CARDIOLOGY | Facility: HOSPITAL | Age: 66
Discharge: HOME | End: 2021-05-12
Attending: INTERNAL MEDICINE
Payer: MEDICARE

## 2021-05-12 VITALS — SYSTOLIC BLOOD PRESSURE: 156 MMHG | HEART RATE: 101 BPM | DIASTOLIC BLOOD PRESSURE: 95 MMHG

## 2021-05-12 DIAGNOSIS — I45.10 RBBB: ICD-10-CM

## 2021-05-12 LAB
CFR FLOW - ANTERIOR: 1.03
CFR FLOW - INFERIOR: 0.97
CFR FLOW - LATERAL: 1.12
CFR FLOW - MAX: 1.45
CFR FLOW - MIN: 0.72
CFR FLOW - SEPTAL: 0.95
CFR FLOW - WHOLE HEART: 1.02
CV PHARM DOSE: 60 MG
CV STRESS BASE HR: 93 BPM
DIASTOLIC BLOOD PRESSURE: 100 MMHG
END DIASTOLIC INDEX-HIGH: 170 ML/M2
END SYSTOLIC INDEX-HIGH: 70 ML/M2
NUC REST DIASTOLIC VOLUME INDEX: 109
NUC REST EJECTION FRACTION: 75
NUC REST SYSTOLIC VOLUME INDEX: 27
NUC STRESS DIASTOLIC VOLUME INDEX: 98
NUC STRESS EJECTION FRACTION: 78 %
NUC STRESS SYSTOLIC VOLUME INDEX: 22
OHS CV CPX 85 PERCENT MAX PREDICTED HEART RATE MALE: 129
OHS CV CPX MAX PREDICTED HEART RATE: 152
OHS CV CPX PATIENT IS FEMALE: 0
OHS CV CPX PATIENT IS MALE: 1
OHS CV CPX PEAK DIASTOLIC BLOOD PRESSURE: 82 MMHG
OHS CV CPX PEAK HEAR RATE: 100 BPM
OHS CV CPX PEAK RATE PRESSURE PRODUCT: NORMAL
OHS CV CPX PEAK SYSTOLIC BLOOD PRESSURE: 138 MMHG
OHS CV CPX PERCENT MAX PREDICTED HEART RATE ACHIEVED: 66
OHS CV CPX RATE PRESSURE PRODUCT PRESENTING: NORMAL
REST FLOW - ANTERIOR: 1.44 CC/MIN/G
REST FLOW - INFERIOR: 1.38 CC/MIN/G
REST FLOW - LATERAL: 1.39 CC/MIN/G
REST FLOW - MAX: 1.85 CC/MIN/G
REST FLOW - MIN: 0.78 CC/MIN/G
REST FLOW - SEPTAL: 1.28 CC/MIN/G
REST FLOW - WHOLE HEART: 1.37 CC/MIN/G
RETIRED EF AND QEF - SEE NOTES: 51 %
STRESS FLOW - ANTERIOR: 1.49 CC/MIN/G
STRESS FLOW - INFERIOR: 1.32 CC/MIN/G
STRESS FLOW - LATERAL: 1.55 CC/MIN/G
STRESS FLOW - MAX: 1.91 CC/MIN/G
STRESS FLOW - MIN: 0.71 CC/MIN/G
STRESS FLOW - SEPTAL: 1.21 CC/MIN/G
STRESS FLOW - WHOLE HEART: 1.39 CC/MIN/G
SYSTOLIC BLOOD PRESSURE: 177 MMHG

## 2021-05-12 PROCEDURE — 78434 AQMBF PET REST & RX STRESS: CPT | Mod: 26,,, | Performed by: INTERNAL MEDICINE

## 2021-05-12 PROCEDURE — 78434 AQMBF PET REST & RX STRESS: CPT

## 2021-05-12 PROCEDURE — 78492 CARDIAC PET SCAN STRESS (CUPID ONLY): ICD-10-PCS | Mod: 26,,, | Performed by: INTERNAL MEDICINE

## 2021-05-12 PROCEDURE — 93016 CARDIAC PET SCAN STRESS (CUPID ONLY): ICD-10-PCS | Mod: ,,, | Performed by: INTERNAL MEDICINE

## 2021-05-12 PROCEDURE — 78492 MYOCRD IMG PET MLT RST&STRS: CPT | Mod: 26,,, | Performed by: INTERNAL MEDICINE

## 2021-05-12 PROCEDURE — 93018 CV STRESS TEST I&R ONLY: CPT | Mod: ,,, | Performed by: INTERNAL MEDICINE

## 2021-05-12 PROCEDURE — 93016 CV STRESS TEST SUPVJ ONLY: CPT | Mod: ,,, | Performed by: INTERNAL MEDICINE

## 2021-05-12 PROCEDURE — 63600175 PHARM REV CODE 636 W HCPCS: Performed by: INTERNAL MEDICINE

## 2021-05-12 PROCEDURE — 78434 CARDIAC PET SCAN STRESS (CUPID ONLY): ICD-10-PCS | Mod: 26,,, | Performed by: INTERNAL MEDICINE

## 2021-05-12 PROCEDURE — 93018 CARDIAC PET SCAN STRESS (CUPID ONLY): ICD-10-PCS | Mod: ,,, | Performed by: INTERNAL MEDICINE

## 2021-05-12 RX ORDER — DIPYRIDAMOLE 5 MG/ML
60 INJECTION INTRAVENOUS ONCE
Status: COMPLETED | OUTPATIENT
Start: 2021-05-12 | End: 2021-05-12

## 2021-05-12 RX ADMIN — DIPYRIDAMOLE 60 MG: 5 INJECTION INTRAVENOUS at 03:05

## 2021-05-13 ENCOUNTER — OFFICE VISIT (OUTPATIENT)
Dept: PRIMARY CARE CLINIC | Facility: CLINIC | Age: 66
End: 2021-05-13
Payer: COMMERCIAL

## 2021-05-13 VITALS
OXYGEN SATURATION: 95 % | DIASTOLIC BLOOD PRESSURE: 82 MMHG | SYSTOLIC BLOOD PRESSURE: 132 MMHG | HEIGHT: 67 IN | WEIGHT: 315 LBS | BODY MASS INDEX: 49.44 KG/M2 | HEART RATE: 82 BPM

## 2021-05-13 DIAGNOSIS — N40.1 BPH WITH OBSTRUCTION/LOWER URINARY TRACT SYMPTOMS: ICD-10-CM

## 2021-05-13 DIAGNOSIS — I10 ESSENTIAL HYPERTENSION: ICD-10-CM

## 2021-05-13 DIAGNOSIS — N13.8 BPH WITH OBSTRUCTION/LOWER URINARY TRACT SYMPTOMS: ICD-10-CM

## 2021-05-13 DIAGNOSIS — E11.42 TYPE 2 DIABETES MELLITUS WITH DIABETIC POLYNEUROPATHY, UNSPECIFIED WHETHER LONG TERM INSULIN USE: ICD-10-CM

## 2021-05-13 DIAGNOSIS — M54.42 CHRONIC LEFT-SIDED LOW BACK PAIN WITH LEFT-SIDED SCIATICA: Primary | ICD-10-CM

## 2021-05-13 DIAGNOSIS — E78.5 HYPERLIPIDEMIA, UNSPECIFIED HYPERLIPIDEMIA TYPE: ICD-10-CM

## 2021-05-13 DIAGNOSIS — E66.01 MORBID OBESITY: ICD-10-CM

## 2021-05-13 DIAGNOSIS — G89.29 CHRONIC LEFT-SIDED LOW BACK PAIN WITH LEFT-SIDED SCIATICA: Primary | ICD-10-CM

## 2021-05-13 DIAGNOSIS — M10.9 GOUTY ARTHROPATHY: ICD-10-CM

## 2021-05-13 DIAGNOSIS — Z98.1 HISTORY OF LUMBAR SPINAL FUSION: ICD-10-CM

## 2021-05-13 DIAGNOSIS — I87.2 CHRONIC VENOUS INSUFFICIENCY: ICD-10-CM

## 2021-05-13 PROBLEM — M25.562 ACUTE PAIN OF LEFT KNEE: Status: ACTIVE | Noted: 2019-01-03

## 2021-05-13 PROBLEM — F11.20 NARCOTIC DEPENDENCE: Status: ACTIVE | Noted: 2018-10-11

## 2021-05-13 PROBLEM — R06.2 WHEEZING: Status: ACTIVE | Noted: 2019-01-03

## 2021-05-13 PROBLEM — M54.9 CHRONIC BACK PAIN: Status: ACTIVE | Noted: 2018-10-11

## 2021-05-13 PROBLEM — E29.1 MALE HYPOGONADISM: Status: ACTIVE | Noted: 2018-10-11

## 2021-05-13 PROCEDURE — 99999 PR PBB SHADOW E&M-EST. PATIENT-LVL IV: ICD-10-PCS | Mod: PBBFAC,,, | Performed by: FAMILY MEDICINE

## 2021-05-13 PROCEDURE — 99999 PR PBB SHADOW E&M-EST. PATIENT-LVL IV: CPT | Mod: PBBFAC,,, | Performed by: FAMILY MEDICINE

## 2021-05-13 PROCEDURE — 99214 PR OFFICE/OUTPT VISIT, EST, LEVL IV, 30-39 MIN: ICD-10-PCS | Mod: S$GLB,,, | Performed by: FAMILY MEDICINE

## 2021-05-13 PROCEDURE — 99214 OFFICE O/P EST MOD 30 MIN: CPT | Mod: PBBFAC,PN | Performed by: FAMILY MEDICINE

## 2021-05-13 PROCEDURE — 99214 OFFICE O/P EST MOD 30 MIN: CPT | Mod: S$GLB,,, | Performed by: FAMILY MEDICINE

## 2021-05-13 RX ORDER — IBUPROFEN 600 MG/1
600 TABLET ORAL EVERY 6 HOURS PRN
Qty: 90 TABLET | Refills: 2 | Status: SHIPPED | OUTPATIENT
Start: 2021-05-13 | End: 2021-09-20

## 2021-05-13 RX ORDER — PREGABALIN 75 MG/1
75 CAPSULE ORAL DAILY
Qty: 90 CAPSULE | Refills: 3 | Status: SHIPPED | OUTPATIENT
Start: 2021-05-13 | End: 2022-02-03

## 2021-05-24 ENCOUNTER — TELEPHONE (OUTPATIENT)
Dept: PAIN MEDICINE | Facility: CLINIC | Age: 66
End: 2021-05-24

## 2021-05-24 ENCOUNTER — OFFICE VISIT (OUTPATIENT)
Dept: CARDIOLOGY | Facility: CLINIC | Age: 66
End: 2021-05-24
Payer: COMMERCIAL

## 2021-05-24 VITALS
DIASTOLIC BLOOD PRESSURE: 76 MMHG | HEART RATE: 96 BPM | SYSTOLIC BLOOD PRESSURE: 140 MMHG | HEIGHT: 67 IN | OXYGEN SATURATION: 93 % | BODY MASS INDEX: 49.44 KG/M2 | WEIGHT: 315 LBS

## 2021-05-24 DIAGNOSIS — I87.2 CHRONIC VENOUS INSUFFICIENCY: ICD-10-CM

## 2021-05-24 DIAGNOSIS — R60.0 EDEMA OF BOTH LOWER EXTREMITIES: ICD-10-CM

## 2021-05-24 DIAGNOSIS — I89.0 LYMPHEDEMA OF BOTH LOWER EXTREMITIES: ICD-10-CM

## 2021-05-24 DIAGNOSIS — E66.01 MORBID OBESITY: Primary | ICD-10-CM

## 2021-05-24 PROCEDURE — 99215 OFFICE O/P EST HI 40 MIN: CPT | Mod: S$GLB,,, | Performed by: INTERNAL MEDICINE

## 2021-05-24 PROCEDURE — 99999 PR PBB SHADOW E&M-EST. PATIENT-LVL IV: CPT | Mod: PBBFAC,,, | Performed by: INTERNAL MEDICINE

## 2021-05-24 PROCEDURE — 99215 PR OFFICE/OUTPT VISIT, EST, LEVL V, 40-54 MIN: ICD-10-PCS | Mod: S$GLB,,, | Performed by: INTERNAL MEDICINE

## 2021-05-24 PROCEDURE — 99999 PR PBB SHADOW E&M-EST. PATIENT-LVL IV: ICD-10-PCS | Mod: PBBFAC,,, | Performed by: INTERNAL MEDICINE

## 2021-05-24 PROCEDURE — 99214 OFFICE O/P EST MOD 30 MIN: CPT | Mod: PBBFAC,PO | Performed by: INTERNAL MEDICINE

## 2021-05-24 RX ORDER — ANASTROZOLE 1 MG/1
1 TABLET ORAL DAILY
COMMUNITY
Start: 2020-12-06

## 2021-05-28 ENCOUNTER — TELEPHONE (OUTPATIENT)
Dept: CARDIOLOGY | Facility: CLINIC | Age: 66
End: 2021-05-28

## 2021-06-02 ENCOUNTER — PATIENT MESSAGE (OUTPATIENT)
Dept: PRIMARY CARE CLINIC | Facility: CLINIC | Age: 66
End: 2021-06-02

## 2021-06-02 ENCOUNTER — OFFICE VISIT (OUTPATIENT)
Dept: CARDIOLOGY | Facility: CLINIC | Age: 66
End: 2021-06-02
Payer: COMMERCIAL

## 2021-06-02 VITALS
HEIGHT: 67 IN | OXYGEN SATURATION: 97 % | SYSTOLIC BLOOD PRESSURE: 134 MMHG | HEART RATE: 86 BPM | BODY MASS INDEX: 49.44 KG/M2 | DIASTOLIC BLOOD PRESSURE: 76 MMHG | WEIGHT: 315 LBS

## 2021-06-02 DIAGNOSIS — R60.0 EDEMA OF BOTH LOWER EXTREMITIES: ICD-10-CM

## 2021-06-02 DIAGNOSIS — I10 ESSENTIAL HYPERTENSION: ICD-10-CM

## 2021-06-02 DIAGNOSIS — E78.5 HYPERLIPIDEMIA, UNSPECIFIED HYPERLIPIDEMIA TYPE: ICD-10-CM

## 2021-06-02 DIAGNOSIS — G47.33 OSA (OBSTRUCTIVE SLEEP APNEA): ICD-10-CM

## 2021-06-02 DIAGNOSIS — I87.2 CHRONIC VENOUS INSUFFICIENCY: ICD-10-CM

## 2021-06-02 DIAGNOSIS — I45.10 RBBB: Primary | ICD-10-CM

## 2021-06-02 PROCEDURE — 99213 OFFICE O/P EST LOW 20 MIN: CPT | Mod: PBBFAC | Performed by: INTERNAL MEDICINE

## 2021-06-02 PROCEDURE — 99999 PR PBB SHADOW E&M-EST. PATIENT-LVL III: ICD-10-PCS | Mod: PBBFAC,,, | Performed by: INTERNAL MEDICINE

## 2021-06-02 PROCEDURE — 99999 PR PBB SHADOW E&M-EST. PATIENT-LVL III: CPT | Mod: PBBFAC,,, | Performed by: INTERNAL MEDICINE

## 2021-06-02 PROCEDURE — 99214 OFFICE O/P EST MOD 30 MIN: CPT | Mod: S$GLB,,, | Performed by: INTERNAL MEDICINE

## 2021-06-02 PROCEDURE — 99214 PR OFFICE/OUTPT VISIT, EST, LEVL IV, 30-39 MIN: ICD-10-PCS | Mod: S$GLB,,, | Performed by: INTERNAL MEDICINE

## 2021-06-03 RX ORDER — ALLOPURINOL 300 MG/1
300 TABLET ORAL DAILY
Qty: 90 TABLET | Refills: 3 | Status: SHIPPED | OUTPATIENT
Start: 2021-06-03 | End: 2022-08-23

## 2021-06-04 ENCOUNTER — PATIENT MESSAGE (OUTPATIENT)
Dept: PRIMARY CARE CLINIC | Facility: CLINIC | Age: 66
End: 2021-06-04

## 2021-06-10 ENCOUNTER — OFFICE VISIT (OUTPATIENT)
Dept: OPTOMETRY | Facility: CLINIC | Age: 66
End: 2021-06-10
Payer: COMMERCIAL

## 2021-06-10 DIAGNOSIS — H52.4 BILATERAL PRESBYOPIA: ICD-10-CM

## 2021-06-10 DIAGNOSIS — E11.9 TYPE 2 DIABETES MELLITUS WITHOUT RETINOPATHY: ICD-10-CM

## 2021-06-10 DIAGNOSIS — H25.13 SENILE NUCLEAR SCLEROSIS, BILATERAL: Primary | ICD-10-CM

## 2021-06-10 LAB
LEFT EYE DM RETINOPATHY: NORMAL
RIGHT EYE DM RETINOPATHY: NORMAL

## 2021-06-10 PROCEDURE — 92004 PR EYE EXAM, NEW PATIENT,COMPREHESV: ICD-10-PCS | Mod: S$GLB,,, | Performed by: OPTOMETRIST

## 2021-06-10 PROCEDURE — 1101F PR PT FALLS ASSESS DOC 0-1 FALLS W/OUT INJ PAST YR: ICD-10-PCS | Mod: CPTII,S$GLB,, | Performed by: OPTOMETRIST

## 2021-06-10 PROCEDURE — 1126F PR PAIN SEVERITY QUANTIFIED, NO PAIN PRESENT: ICD-10-PCS | Mod: S$GLB,,, | Performed by: OPTOMETRIST

## 2021-06-10 PROCEDURE — 1126F AMNT PAIN NOTED NONE PRSNT: CPT | Mod: S$GLB,,, | Performed by: OPTOMETRIST

## 2021-06-10 PROCEDURE — 92015 PR REFRACTION: ICD-10-PCS | Mod: S$GLB,,, | Performed by: OPTOMETRIST

## 2021-06-10 PROCEDURE — 3288F PR FALLS RISK ASSESSMENT DOCUMENTED: ICD-10-PCS | Mod: CPTII,S$GLB,, | Performed by: OPTOMETRIST

## 2021-06-10 PROCEDURE — 99999 PR PBB SHADOW E&M-EST. PATIENT-LVL III: ICD-10-PCS | Mod: PBBFAC,,, | Performed by: OPTOMETRIST

## 2021-06-10 PROCEDURE — 92015 DETERMINE REFRACTIVE STATE: CPT | Mod: S$GLB,,, | Performed by: OPTOMETRIST

## 2021-06-10 PROCEDURE — 99999 PR PBB SHADOW E&M-EST. PATIENT-LVL III: CPT | Mod: PBBFAC,,, | Performed by: OPTOMETRIST

## 2021-06-10 PROCEDURE — 92004 COMPRE OPH EXAM NEW PT 1/>: CPT | Mod: S$GLB,,, | Performed by: OPTOMETRIST

## 2021-06-10 PROCEDURE — 2023F PR DILATED RETINAL EXAM W/O EVID OF RETINOPATHY: ICD-10-PCS | Mod: S$GLB,,, | Performed by: OPTOMETRIST

## 2021-06-10 PROCEDURE — 3288F FALL RISK ASSESSMENT DOCD: CPT | Mod: CPTII,S$GLB,, | Performed by: OPTOMETRIST

## 2021-06-10 PROCEDURE — 1101F PT FALLS ASSESS-DOCD LE1/YR: CPT | Mod: CPTII,S$GLB,, | Performed by: OPTOMETRIST

## 2021-06-10 PROCEDURE — 2023F DILAT RTA XM W/O RTNOPTHY: CPT | Mod: S$GLB,,, | Performed by: OPTOMETRIST

## 2021-06-24 DIAGNOSIS — Z12.11 COLON CANCER SCREENING: ICD-10-CM

## 2021-06-24 DIAGNOSIS — E11.9 TYPE 2 DIABETES MELLITUS WITHOUT COMPLICATION: ICD-10-CM

## 2021-07-20 ENCOUNTER — OFFICE VISIT (OUTPATIENT)
Dept: CARDIOLOGY | Facility: CLINIC | Age: 66
End: 2021-07-20
Payer: COMMERCIAL

## 2021-07-20 ENCOUNTER — HOSPITAL ENCOUNTER (OUTPATIENT)
Dept: CARDIOLOGY | Facility: HOSPITAL | Age: 66
Discharge: HOME | End: 2021-07-20
Attending: INTERNAL MEDICINE
Payer: COMMERCIAL

## 2021-07-20 VITALS
DIASTOLIC BLOOD PRESSURE: 66 MMHG | HEART RATE: 86 BPM | SYSTOLIC BLOOD PRESSURE: 132 MMHG | OXYGEN SATURATION: 97 % | BODY MASS INDEX: 49.44 KG/M2 | WEIGHT: 315 LBS | HEIGHT: 67 IN

## 2021-07-20 DIAGNOSIS — I10 BENIGN ESSENTIAL HYPERTENSION: ICD-10-CM

## 2021-07-20 DIAGNOSIS — E66.01 MORBID OBESITY: ICD-10-CM

## 2021-07-20 DIAGNOSIS — I89.0 LYMPHEDEMA OF BOTH LOWER EXTREMITIES: ICD-10-CM

## 2021-07-20 DIAGNOSIS — I45.10 RBBB: ICD-10-CM

## 2021-07-20 DIAGNOSIS — R60.0 EDEMA OF BOTH LOWER EXTREMITIES: Primary | ICD-10-CM

## 2021-07-20 DIAGNOSIS — E78.2 MIXED HYPERLIPIDEMIA: ICD-10-CM

## 2021-07-20 DIAGNOSIS — I87.2 CHRONIC VENOUS INSUFFICIENCY: ICD-10-CM

## 2021-07-20 PROCEDURE — 1126F PR PAIN SEVERITY QUANTIFIED, NO PAIN PRESENT: ICD-10-PCS | Mod: CPTII,S$GLB,, | Performed by: INTERNAL MEDICINE

## 2021-07-20 PROCEDURE — 93306 TTE W/DOPPLER COMPLETE: CPT | Mod: PN

## 2021-07-20 PROCEDURE — 3288F PR FALLS RISK ASSESSMENT DOCUMENTED: ICD-10-PCS | Mod: CPTII,S$GLB,, | Performed by: INTERNAL MEDICINE

## 2021-07-20 PROCEDURE — 93306 ECHO (CUPID ONLY): ICD-10-PCS | Mod: 26,,, | Performed by: INTERNAL MEDICINE

## 2021-07-20 PROCEDURE — 99215 PR OFFICE/OUTPT VISIT, EST, LEVL V, 40-54 MIN: ICD-10-PCS | Mod: S$GLB,,, | Performed by: INTERNAL MEDICINE

## 2021-07-20 PROCEDURE — 3008F PR BODY MASS INDEX (BMI) DOCUMENTED: ICD-10-PCS | Mod: CPTII,S$GLB,, | Performed by: INTERNAL MEDICINE

## 2021-07-20 PROCEDURE — 99215 OFFICE O/P EST HI 40 MIN: CPT | Mod: S$GLB,,, | Performed by: INTERNAL MEDICINE

## 2021-07-20 PROCEDURE — 1100F PTFALLS ASSESS-DOCD GE2>/YR: CPT | Mod: CPTII,S$GLB,, | Performed by: INTERNAL MEDICINE

## 2021-07-20 PROCEDURE — 3075F SYST BP GE 130 - 139MM HG: CPT | Mod: CPTII,S$GLB,, | Performed by: INTERNAL MEDICINE

## 2021-07-20 PROCEDURE — 3078F PR MOST RECENT DIASTOLIC BLOOD PRESSURE < 80 MM HG: ICD-10-PCS | Mod: CPTII,S$GLB,, | Performed by: INTERNAL MEDICINE

## 2021-07-20 PROCEDURE — 3075F PR MOST RECENT SYSTOLIC BLOOD PRESS GE 130-139MM HG: ICD-10-PCS | Mod: CPTII,S$GLB,, | Performed by: INTERNAL MEDICINE

## 2021-07-20 PROCEDURE — 3288F FALL RISK ASSESSMENT DOCD: CPT | Mod: CPTII,S$GLB,, | Performed by: INTERNAL MEDICINE

## 2021-07-20 PROCEDURE — 1126F AMNT PAIN NOTED NONE PRSNT: CPT | Mod: CPTII,S$GLB,, | Performed by: INTERNAL MEDICINE

## 2021-07-20 PROCEDURE — 3008F BODY MASS INDEX DOCD: CPT | Mod: CPTII,S$GLB,, | Performed by: INTERNAL MEDICINE

## 2021-07-20 PROCEDURE — 99999 PR PBB SHADOW E&M-EST. PATIENT-LVL III: CPT | Mod: PBBFAC,,, | Performed by: INTERNAL MEDICINE

## 2021-07-20 PROCEDURE — 93306 TTE W/DOPPLER COMPLETE: CPT | Mod: 26,,, | Performed by: INTERNAL MEDICINE

## 2021-07-20 PROCEDURE — 1159F MED LIST DOCD IN RCRD: CPT | Mod: CPTII,S$GLB,, | Performed by: INTERNAL MEDICINE

## 2021-07-20 PROCEDURE — 1159F PR MEDICATION LIST DOCUMENTED IN MEDICAL RECORD: ICD-10-PCS | Mod: CPTII,S$GLB,, | Performed by: INTERNAL MEDICINE

## 2021-07-20 PROCEDURE — 99999 PR PBB SHADOW E&M-EST. PATIENT-LVL III: ICD-10-PCS | Mod: PBBFAC,,, | Performed by: INTERNAL MEDICINE

## 2021-07-20 PROCEDURE — 1100F PR PT FALLS ASSESS DOC 2+ FALLS/FALL W/INJURY/YR: ICD-10-PCS | Mod: CPTII,S$GLB,, | Performed by: INTERNAL MEDICINE

## 2021-07-20 PROCEDURE — 3078F DIAST BP <80 MM HG: CPT | Mod: CPTII,S$GLB,, | Performed by: INTERNAL MEDICINE

## 2021-07-21 VITALS
DIASTOLIC BLOOD PRESSURE: 76 MMHG | WEIGHT: 315 LBS | SYSTOLIC BLOOD PRESSURE: 134 MMHG | BODY MASS INDEX: 49.44 KG/M2 | HEIGHT: 67 IN

## 2021-07-21 LAB
ASCENDING AORTA: 2.82 CM
AV INDEX (PROSTH): 0.69
AV MEAN GRADIENT: 5 MMHG
AV PEAK GRADIENT: 9 MMHG
AV VALVE AREA: 2.38 CM2
AV VELOCITY RATIO: 0.67
BSA FOR ECHO PROCEDURE: 2.6 M2
CV ECHO LV RWT: 0.66 CM
DOP CALC AO PEAK VEL: 1.54 M/S
DOP CALC AO VTI: 27.53 CM
DOP CALC LVOT AREA: 3.4 CM2
DOP CALC LVOT DIAMETER: 2.09 CM
DOP CALC LVOT PEAK VEL: 1.03 M/S
DOP CALC LVOT STROKE VOLUME: 65.46 CM3
DOP CALCLVOT PEAK VEL VTI: 19.09 CM
E WAVE DECELERATION TIME: 246.32 MSEC
E/A RATIO: 0.83
E/E' RATIO: 6.84 M/S
ECHO LV POSTERIOR WALL: 1.36 CM (ref 0.6–1.1)
EJECTION FRACTION: 55 %
FRACTIONAL SHORTENING: 27 % (ref 28–44)
INTERVENTRICULAR SEPTUM: 1.3 CM (ref 0.6–1.1)
IVRT: 79.92 MSEC
LA MAJOR: 6.67 CM
LA MINOR: 5.15 CM
LA WIDTH: 4.18 CM
LEFT ATRIUM SIZE: 3.62 CM
LEFT ATRIUM VOLUME INDEX MOD: 20.8 ML/M2
LEFT ATRIUM VOLUME INDEX: 30.5 ML/M2
LEFT ATRIUM VOLUME MOD: 51 CM3
LEFT ATRIUM VOLUME: 74.76 CM3
LEFT INTERNAL DIMENSION IN SYSTOLE: 3.03 CM (ref 2.1–4)
LEFT VENTRICLE DIASTOLIC VOLUME INDEX: 30.74 ML/M2
LEFT VENTRICLE DIASTOLIC VOLUME: 75.31 ML
LEFT VENTRICLE MASS INDEX: 83 G/M2
LEFT VENTRICLE SYSTOLIC VOLUME INDEX: 14.7 ML/M2
LEFT VENTRICLE SYSTOLIC VOLUME: 35.91 ML
LEFT VENTRICULAR INTERNAL DIMENSION IN DIASTOLE: 4.13 CM (ref 3.5–6)
LEFT VENTRICULAR MASS: 202.44 G
LV LATERAL E/E' RATIO: 5.91 M/S
LV SEPTAL E/E' RATIO: 8.13 M/S
MV PEAK A VEL: 0.78 M/S
MV PEAK E VEL: 0.65 M/S
MV STENOSIS PRESSURE HALF TIME: 71.43 MS
MV VALVE AREA P 1/2 METHOD: 3.08 CM2
PISA TR MAX VEL: 2.75 M/S
PULM VEIN S/D RATIO: 0.91
PV PEAK D VEL: 0.56 M/S
PV PEAK S VEL: 0.51 M/S
PV PEAK VELOCITY: 1.01 CM/S
RA MAJOR: 5.81 CM
RA PRESSURE: 3 MMHG
RA WIDTH: 3.45 CM
RIGHT VENTRICULAR END-DIASTOLIC DIMENSION: 3.75 CM
RV TISSUE DOPPLER FREE WALL SYSTOLIC VELOCITY 1 (APICAL 4 CHAMBER VIEW): 9.76 CM/S
SINUS: 3.39 CM
STJ: 2.65 CM
TDI LATERAL: 0.11 M/S
TDI SEPTAL: 0.08 M/S
TDI: 0.1 M/S
TR MAX PG: 30 MMHG
TRICUSPID ANNULAR PLANE SYSTOLIC EXCURSION: 1.8 CM
TV REST PULMONARY ARTERY PRESSURE: 33 MMHG

## 2021-08-03 ENCOUNTER — HOSPITAL ENCOUNTER (OUTPATIENT)
Dept: CARDIOLOGY | Facility: HOSPITAL | Age: 66
Discharge: HOME | End: 2021-08-03
Attending: INTERNAL MEDICINE
Payer: COMMERCIAL

## 2021-08-03 DIAGNOSIS — I87.2 CHRONIC VENOUS INSUFFICIENCY: ICD-10-CM

## 2021-08-03 DIAGNOSIS — I87.2 CHRONIC VENOUS INSUFFICIENCY: Primary | ICD-10-CM

## 2021-08-03 DIAGNOSIS — R60.0 EDEMA OF BOTH LOWER EXTREMITIES: ICD-10-CM

## 2021-08-03 PROCEDURE — 93923 UPR/LXTR ART STDY 3+ LVLS: CPT | Mod: 26,,, | Performed by: INTERNAL MEDICINE

## 2021-08-03 PROCEDURE — 93970 EXTREMITY STUDY: CPT | Mod: 26,,, | Performed by: INTERNAL MEDICINE

## 2021-08-03 PROCEDURE — 93922 UPR/L XTREMITY ART 2 LEVELS: CPT

## 2021-08-03 PROCEDURE — 93923 UPR/LXTR ART STDY 3+ LVLS: CPT | Mod: 50

## 2021-08-03 PROCEDURE — 93970 EXTREMITY STUDY: CPT | Mod: TC

## 2021-08-03 PROCEDURE — 93970 CV US LOWER VENOUS INSUFFICIENCY BILATERAL (CUPID ONLY): ICD-10-PCS | Mod: 26,,, | Performed by: INTERNAL MEDICINE

## 2021-08-03 PROCEDURE — 93923 SEGMENTAL PRESSURE LOWER EXTREMITY: ICD-10-PCS | Mod: 26,,, | Performed by: INTERNAL MEDICINE

## 2021-08-04 LAB
LEFT ABI: 0.95
LEFT ARM BP: 142 MMHG
LEFT CALF BP: 144 MMHG
LEFT DORSALIS PEDIS: 157 MMHG
LEFT GREAT SAPHENOUS DISTAL THIGH REFLUX: 1524 MS
LEFT GREAT SAPHENOUS JUNCTION DIA: 0.58 CM
LEFT GREAT SAPHENOUS KNEE REFLUX: 2774 MS
LEFT GREAT SAPHENOUS MIDDLE THIGH DIA: 0.5 CM
LEFT GREAT SAPHENOUS MIDDLE THIGH REFLUX: 1021 MS
LEFT GREAT SAPHENOUS PROXIMAL CALF DIA: 0.4 CM
LEFT GREAT SAPHENOUS PROXIMAL CALF REFLUX: 1724 MS
LEFT POSTERIOR TIBIAL: 154 MMHG
LEFT SMALL SAPHENOUS KNEE DIA: 0.44 CM
LEFT SMALL SAPHENOUS SPJ DIA: 0.32 CM
LEFT SMALL SAPHENOUS SPJ REFLUX: 530 MS
LEFT TBI: 0.76
LEFT TOE PRESSURE: 126 MMHG
LEFT UPPER LEG BP: 237 MMHG
RIGHT ABI: 0.94
RIGHT ARM BP: 165 MMHG
RIGHT CALF BP: 164 MMHG
RIGHT DORSALIS PEDIS: 141 MMHG
RIGHT GREAT SAPHENOUS DISTAL THIGH DIA: 0.54 CM
RIGHT GREAT SAPHENOUS DISTAL THIGH REFLUX: 2820 MS
RIGHT GREAT SAPHENOUS JUNCTION DIA: 0.67 CM
RIGHT GREAT SAPHENOUS KNEE DIA: 0.38 CM
RIGHT GREAT SAPHENOUS KNEE REFLUX: 3219 MS
RIGHT GREAT SAPHENOUS MIDDLE THIGH DIA: 0.52 CM
RIGHT GREAT SAPHENOUS MIDDLE THIGH REFLUX: 787 MS
RIGHT GREAT SAPHENOUS PROXIMAL CALF DIA: 0.4 CM
RIGHT GREAT SAPHENOUS PROXIMAL CALF REFLUX: 1632 MS
RIGHT POSTERIOR TIBIAL: 155 MMHG
RIGHT TBI: 0.73
RIGHT TOE PRESSURE: 120 MMHG
RIGHT UPPER LEG BP: 254 MMHG

## 2021-08-24 ENCOUNTER — PATIENT MESSAGE (OUTPATIENT)
Dept: ADMINISTRATIVE | Facility: OTHER | Age: 66
End: 2021-08-24

## 2021-08-25 DIAGNOSIS — Z11.59 NEED FOR HEPATITIS C SCREENING TEST: ICD-10-CM

## 2021-09-13 ENCOUNTER — OFFICE VISIT (OUTPATIENT)
Dept: PRIMARY CARE CLINIC | Facility: CLINIC | Age: 66
End: 2021-09-13
Payer: COMMERCIAL

## 2021-09-13 VITALS
WEIGHT: 305.69 LBS | OXYGEN SATURATION: 97 % | RESPIRATION RATE: 20 BRPM | BODY MASS INDEX: 47.98 KG/M2 | SYSTOLIC BLOOD PRESSURE: 150 MMHG | HEART RATE: 88 BPM | HEIGHT: 67 IN | DIASTOLIC BLOOD PRESSURE: 70 MMHG | TEMPERATURE: 98 F

## 2021-09-13 DIAGNOSIS — E66.01 MORBID OBESITY: ICD-10-CM

## 2021-09-13 DIAGNOSIS — I87.2 CHRONIC VENOUS INSUFFICIENCY: ICD-10-CM

## 2021-09-13 DIAGNOSIS — E11.42 TYPE 2 DIABETES MELLITUS WITH DIABETIC POLYNEUROPATHY, UNSPECIFIED WHETHER LONG TERM INSULIN USE: Primary | ICD-10-CM

## 2021-09-13 DIAGNOSIS — I89.0 LYMPHEDEMA OF BOTH LOWER EXTREMITIES: ICD-10-CM

## 2021-09-13 DIAGNOSIS — I10 ESSENTIAL HYPERTENSION: ICD-10-CM

## 2021-09-13 PROCEDURE — 1101F PT FALLS ASSESS-DOCD LE1/YR: CPT | Mod: CPTII,S$GLB,, | Performed by: FAMILY MEDICINE

## 2021-09-13 PROCEDURE — 1159F PR MEDICATION LIST DOCUMENTED IN MEDICAL RECORD: ICD-10-PCS | Mod: CPTII,S$GLB,, | Performed by: FAMILY MEDICINE

## 2021-09-13 PROCEDURE — 1160F PR REVIEW ALL MEDS BY PRESCRIBER/CLIN PHARMACIST DOCUMENTED: ICD-10-PCS | Mod: CPTII,S$GLB,, | Performed by: FAMILY MEDICINE

## 2021-09-13 PROCEDURE — 4010F ACE/ARB THERAPY RXD/TAKEN: CPT | Mod: CPTII,S$GLB,, | Performed by: FAMILY MEDICINE

## 2021-09-13 PROCEDURE — 1101F PR PT FALLS ASSESS DOC 0-1 FALLS W/OUT INJ PAST YR: ICD-10-PCS | Mod: CPTII,S$GLB,, | Performed by: FAMILY MEDICINE

## 2021-09-13 PROCEDURE — 99213 PR OFFICE/OUTPT VISIT, EST, LEVL III, 20-29 MIN: ICD-10-PCS | Mod: S$GLB,,, | Performed by: FAMILY MEDICINE

## 2021-09-13 PROCEDURE — 3078F DIAST BP <80 MM HG: CPT | Mod: CPTII,S$GLB,, | Performed by: FAMILY MEDICINE

## 2021-09-13 PROCEDURE — 99999 PR PBB SHADOW E&M-EST. PATIENT-LVL IV: CPT | Mod: PBBFAC,,, | Performed by: FAMILY MEDICINE

## 2021-09-13 PROCEDURE — 3078F PR MOST RECENT DIASTOLIC BLOOD PRESSURE < 80 MM HG: ICD-10-PCS | Mod: CPTII,S$GLB,, | Performed by: FAMILY MEDICINE

## 2021-09-13 PROCEDURE — 99213 OFFICE O/P EST LOW 20 MIN: CPT | Mod: S$GLB,,, | Performed by: FAMILY MEDICINE

## 2021-09-13 PROCEDURE — 3288F PR FALLS RISK ASSESSMENT DOCUMENTED: ICD-10-PCS | Mod: CPTII,S$GLB,, | Performed by: FAMILY MEDICINE

## 2021-09-13 PROCEDURE — 1125F AMNT PAIN NOTED PAIN PRSNT: CPT | Mod: CPTII,S$GLB,, | Performed by: FAMILY MEDICINE

## 2021-09-13 PROCEDURE — 1159F MED LIST DOCD IN RCRD: CPT | Mod: CPTII,S$GLB,, | Performed by: FAMILY MEDICINE

## 2021-09-13 PROCEDURE — 1125F PR PAIN SEVERITY QUANTIFIED, PAIN PRESENT: ICD-10-PCS | Mod: CPTII,S$GLB,, | Performed by: FAMILY MEDICINE

## 2021-09-13 PROCEDURE — 99999 PR PBB SHADOW E&M-EST. PATIENT-LVL IV: ICD-10-PCS | Mod: PBBFAC,,, | Performed by: FAMILY MEDICINE

## 2021-09-13 PROCEDURE — 3008F BODY MASS INDEX DOCD: CPT | Mod: CPTII,S$GLB,, | Performed by: FAMILY MEDICINE

## 2021-09-13 PROCEDURE — 3077F PR MOST RECENT SYSTOLIC BLOOD PRESSURE >= 140 MM HG: ICD-10-PCS | Mod: CPTII,S$GLB,, | Performed by: FAMILY MEDICINE

## 2021-09-13 PROCEDURE — 3008F PR BODY MASS INDEX (BMI) DOCUMENTED: ICD-10-PCS | Mod: CPTII,S$GLB,, | Performed by: FAMILY MEDICINE

## 2021-09-13 PROCEDURE — 4010F PR ACE/ARB THEARPY RXD/TAKEN: ICD-10-PCS | Mod: CPTII,S$GLB,, | Performed by: FAMILY MEDICINE

## 2021-09-13 PROCEDURE — 1160F RVW MEDS BY RX/DR IN RCRD: CPT | Mod: CPTII,S$GLB,, | Performed by: FAMILY MEDICINE

## 2021-09-13 PROCEDURE — 3077F SYST BP >= 140 MM HG: CPT | Mod: CPTII,S$GLB,, | Performed by: FAMILY MEDICINE

## 2021-09-13 PROCEDURE — 3288F FALL RISK ASSESSMENT DOCD: CPT | Mod: CPTII,S$GLB,, | Performed by: FAMILY MEDICINE

## 2021-09-23 ENCOUNTER — PATIENT MESSAGE (OUTPATIENT)
Dept: PRIMARY CARE CLINIC | Facility: CLINIC | Age: 66
End: 2021-09-23

## 2021-09-24 ENCOUNTER — TELEPHONE (OUTPATIENT)
Dept: PRIMARY CARE CLINIC | Facility: CLINIC | Age: 66
End: 2021-09-24

## 2021-09-24 RX ORDER — OLMESARTAN MEDOXOMIL 40 MG/1
40 TABLET ORAL DAILY
Qty: 90 TABLET | Refills: 3 | Status: SHIPPED | OUTPATIENT
Start: 2021-09-24 | End: 2022-09-21 | Stop reason: SDUPTHER

## 2021-09-24 RX ORDER — ATORVASTATIN CALCIUM 40 MG/1
40 TABLET, FILM COATED ORAL NIGHTLY
Qty: 90 TABLET | Refills: 3 | Status: SHIPPED | OUTPATIENT
Start: 2021-09-24 | End: 2022-12-27 | Stop reason: SDUPTHER

## 2021-10-04 ENCOUNTER — CLINICAL SUPPORT (OUTPATIENT)
Dept: REHABILITATION | Facility: HOSPITAL | Age: 66
End: 2021-10-04
Payer: COMMERCIAL

## 2021-10-04 DIAGNOSIS — R60.0 EDEMA OF BOTH LOWER EXTREMITIES: ICD-10-CM

## 2021-10-04 DIAGNOSIS — M79.89 SWELLING OF LOWER LIMB: ICD-10-CM

## 2021-10-04 DIAGNOSIS — I87.2 CHRONIC VENOUS INSUFFICIENCY: Primary | ICD-10-CM

## 2021-10-04 DIAGNOSIS — I89.0 LYMPHEDEMA OF BOTH LOWER EXTREMITIES: ICD-10-CM

## 2021-10-04 PROCEDURE — 97162 PT EVAL MOD COMPLEX 30 MIN: CPT

## 2021-10-04 PROCEDURE — 97535 SELF CARE MNGMENT TRAINING: CPT

## 2021-10-04 PROCEDURE — 97140 MANUAL THERAPY 1/> REGIONS: CPT

## 2021-10-05 ENCOUNTER — PATIENT MESSAGE (OUTPATIENT)
Dept: ADMINISTRATIVE | Facility: HOSPITAL | Age: 66
End: 2021-10-05

## 2021-10-11 ENCOUNTER — CLINICAL SUPPORT (OUTPATIENT)
Dept: REHABILITATION | Facility: HOSPITAL | Age: 66
End: 2021-10-11
Payer: COMMERCIAL

## 2021-10-11 DIAGNOSIS — E66.01 MORBID OBESITY: ICD-10-CM

## 2021-10-11 DIAGNOSIS — M79.89 SWELLING OF LOWER LIMB: ICD-10-CM

## 2021-10-11 DIAGNOSIS — R60.0 EDEMA OF BOTH LOWER EXTREMITIES: ICD-10-CM

## 2021-10-11 DIAGNOSIS — I89.0 LYMPHEDEMA OF BOTH LOWER EXTREMITIES: Primary | ICD-10-CM

## 2021-10-11 DIAGNOSIS — E11.42 TYPE 2 DIABETES MELLITUS WITH DIABETIC POLYNEUROPATHY, UNSPECIFIED WHETHER LONG TERM INSULIN USE: ICD-10-CM

## 2021-10-11 DIAGNOSIS — I87.2 CHRONIC VENOUS INSUFFICIENCY: ICD-10-CM

## 2021-10-11 PROCEDURE — 97140 MANUAL THERAPY 1/> REGIONS: CPT | Mod: CQ

## 2021-10-13 ENCOUNTER — CLINICAL SUPPORT (OUTPATIENT)
Dept: REHABILITATION | Facility: HOSPITAL | Age: 66
End: 2021-10-13
Payer: COMMERCIAL

## 2021-10-13 DIAGNOSIS — R60.0 EDEMA OF BOTH LOWER EXTREMITIES: ICD-10-CM

## 2021-10-13 DIAGNOSIS — E11.42 TYPE 2 DIABETES MELLITUS WITH DIABETIC POLYNEUROPATHY, UNSPECIFIED WHETHER LONG TERM INSULIN USE: ICD-10-CM

## 2021-10-13 DIAGNOSIS — I87.2 CHRONIC VENOUS INSUFFICIENCY: ICD-10-CM

## 2021-10-13 DIAGNOSIS — E11.9 TYPE 2 DIABETES MELLITUS WITHOUT COMPLICATION: ICD-10-CM

## 2021-10-13 DIAGNOSIS — M79.89 SWELLING OF LOWER LIMB: ICD-10-CM

## 2021-10-13 DIAGNOSIS — E66.01 MORBID OBESITY: ICD-10-CM

## 2021-10-13 DIAGNOSIS — I89.0 LYMPHEDEMA OF BOTH LOWER EXTREMITIES: Primary | ICD-10-CM

## 2021-10-13 PROCEDURE — 97140 MANUAL THERAPY 1/> REGIONS: CPT

## 2021-10-13 PROCEDURE — 97016 VASOPNEUMATIC DEVICE THERAPY: CPT

## 2021-10-19 ENCOUNTER — PATIENT OUTREACH (OUTPATIENT)
Dept: ADMINISTRATIVE | Facility: OTHER | Age: 66
End: 2021-10-19

## 2021-10-20 ENCOUNTER — CLINICAL SUPPORT (OUTPATIENT)
Dept: REHABILITATION | Facility: HOSPITAL | Age: 66
End: 2021-10-20
Payer: COMMERCIAL

## 2021-10-20 DIAGNOSIS — M79.89 SWELLING OF LOWER LIMB: ICD-10-CM

## 2021-10-20 DIAGNOSIS — E66.01 MORBID OBESITY: ICD-10-CM

## 2021-10-20 DIAGNOSIS — R60.0 EDEMA OF BOTH LOWER EXTREMITIES: ICD-10-CM

## 2021-10-20 DIAGNOSIS — I89.0 LYMPHEDEMA OF BOTH LOWER EXTREMITIES: Primary | ICD-10-CM

## 2021-10-20 DIAGNOSIS — E11.42 TYPE 2 DIABETES MELLITUS WITH DIABETIC POLYNEUROPATHY, UNSPECIFIED WHETHER LONG TERM INSULIN USE: ICD-10-CM

## 2021-10-20 DIAGNOSIS — I87.2 CHRONIC VENOUS INSUFFICIENCY: ICD-10-CM

## 2021-10-20 PROCEDURE — 97016 VASOPNEUMATIC DEVICE THERAPY: CPT

## 2021-10-20 PROCEDURE — 97140 MANUAL THERAPY 1/> REGIONS: CPT

## 2021-10-21 ENCOUNTER — OFFICE VISIT (OUTPATIENT)
Dept: CARDIOLOGY | Facility: CLINIC | Age: 66
End: 2021-10-21
Payer: COMMERCIAL

## 2021-10-21 VITALS
SYSTOLIC BLOOD PRESSURE: 140 MMHG | DIASTOLIC BLOOD PRESSURE: 70 MMHG | HEIGHT: 67 IN | BODY MASS INDEX: 49.44 KG/M2 | OXYGEN SATURATION: 96 % | HEART RATE: 95 BPM | WEIGHT: 315 LBS

## 2021-10-21 DIAGNOSIS — M79.89 SWELLING OF LOWER LIMB: ICD-10-CM

## 2021-10-21 DIAGNOSIS — E78.2 MIXED HYPERLIPIDEMIA: ICD-10-CM

## 2021-10-21 DIAGNOSIS — I87.2 CHRONIC VENOUS INSUFFICIENCY: ICD-10-CM

## 2021-10-21 DIAGNOSIS — I89.0 LYMPHEDEMA OF BOTH LOWER EXTREMITIES: Primary | ICD-10-CM

## 2021-10-21 DIAGNOSIS — E66.01 MORBID OBESITY: ICD-10-CM

## 2021-10-21 DIAGNOSIS — R60.0 EDEMA OF BOTH LOWER EXTREMITIES: ICD-10-CM

## 2021-10-21 PROCEDURE — 3288F FALL RISK ASSESSMENT DOCD: CPT | Mod: CPTII,S$GLB,, | Performed by: INTERNAL MEDICINE

## 2021-10-21 PROCEDURE — 3077F PR MOST RECENT SYSTOLIC BLOOD PRESSURE >= 140 MM HG: ICD-10-PCS | Mod: CPTII,S$GLB,, | Performed by: INTERNAL MEDICINE

## 2021-10-21 PROCEDURE — 3078F DIAST BP <80 MM HG: CPT | Mod: CPTII,S$GLB,, | Performed by: INTERNAL MEDICINE

## 2021-10-21 PROCEDURE — 1101F PR PT FALLS ASSESS DOC 0-1 FALLS W/OUT INJ PAST YR: ICD-10-PCS | Mod: CPTII,S$GLB,, | Performed by: INTERNAL MEDICINE

## 2021-10-21 PROCEDURE — 3008F BODY MASS INDEX DOCD: CPT | Mod: CPTII,S$GLB,, | Performed by: INTERNAL MEDICINE

## 2021-10-21 PROCEDURE — 3077F SYST BP >= 140 MM HG: CPT | Mod: CPTII,S$GLB,, | Performed by: INTERNAL MEDICINE

## 2021-10-21 PROCEDURE — 1126F PR PAIN SEVERITY QUANTIFIED, NO PAIN PRESENT: ICD-10-PCS | Mod: CPTII,S$GLB,, | Performed by: INTERNAL MEDICINE

## 2021-10-21 PROCEDURE — 99215 PR OFFICE/OUTPT VISIT, EST, LEVL V, 40-54 MIN: ICD-10-PCS | Mod: S$GLB,,, | Performed by: INTERNAL MEDICINE

## 2021-10-21 PROCEDURE — 99215 OFFICE O/P EST HI 40 MIN: CPT | Mod: S$GLB,,, | Performed by: INTERNAL MEDICINE

## 2021-10-21 PROCEDURE — 3288F PR FALLS RISK ASSESSMENT DOCUMENTED: ICD-10-PCS | Mod: CPTII,S$GLB,, | Performed by: INTERNAL MEDICINE

## 2021-10-21 PROCEDURE — 1159F PR MEDICATION LIST DOCUMENTED IN MEDICAL RECORD: ICD-10-PCS | Mod: CPTII,S$GLB,, | Performed by: INTERNAL MEDICINE

## 2021-10-21 PROCEDURE — 99999 PR PBB SHADOW E&M-EST. PATIENT-LVL IV: CPT | Mod: PBBFAC,,, | Performed by: INTERNAL MEDICINE

## 2021-10-21 PROCEDURE — 3008F PR BODY MASS INDEX (BMI) DOCUMENTED: ICD-10-PCS | Mod: CPTII,S$GLB,, | Performed by: INTERNAL MEDICINE

## 2021-10-21 PROCEDURE — 1101F PT FALLS ASSESS-DOCD LE1/YR: CPT | Mod: CPTII,S$GLB,, | Performed by: INTERNAL MEDICINE

## 2021-10-21 PROCEDURE — 3078F PR MOST RECENT DIASTOLIC BLOOD PRESSURE < 80 MM HG: ICD-10-PCS | Mod: CPTII,S$GLB,, | Performed by: INTERNAL MEDICINE

## 2021-10-21 PROCEDURE — 1159F MED LIST DOCD IN RCRD: CPT | Mod: CPTII,S$GLB,, | Performed by: INTERNAL MEDICINE

## 2021-10-21 PROCEDURE — 4010F ACE/ARB THERAPY RXD/TAKEN: CPT | Mod: CPTII,S$GLB,, | Performed by: INTERNAL MEDICINE

## 2021-10-21 PROCEDURE — 1126F AMNT PAIN NOTED NONE PRSNT: CPT | Mod: CPTII,S$GLB,, | Performed by: INTERNAL MEDICINE

## 2021-10-21 PROCEDURE — 99999 PR PBB SHADOW E&M-EST. PATIENT-LVL IV: ICD-10-PCS | Mod: PBBFAC,,, | Performed by: INTERNAL MEDICINE

## 2021-10-21 PROCEDURE — 4010F PR ACE/ARB THEARPY RXD/TAKEN: ICD-10-PCS | Mod: CPTII,S$GLB,, | Performed by: INTERNAL MEDICINE

## 2021-10-25 ENCOUNTER — CLINICAL SUPPORT (OUTPATIENT)
Dept: REHABILITATION | Facility: HOSPITAL | Age: 66
End: 2021-10-25
Payer: COMMERCIAL

## 2021-10-25 DIAGNOSIS — I87.2 CHRONIC VENOUS INSUFFICIENCY: ICD-10-CM

## 2021-10-25 DIAGNOSIS — I89.0 LYMPHEDEMA OF BOTH LOWER EXTREMITIES: Primary | ICD-10-CM

## 2021-10-25 DIAGNOSIS — R60.0 EDEMA OF BOTH LOWER EXTREMITIES: ICD-10-CM

## 2021-10-25 DIAGNOSIS — E11.42 TYPE 2 DIABETES MELLITUS WITH DIABETIC POLYNEUROPATHY, UNSPECIFIED WHETHER LONG TERM INSULIN USE: ICD-10-CM

## 2021-10-25 DIAGNOSIS — E66.01 MORBID OBESITY: ICD-10-CM

## 2021-10-25 DIAGNOSIS — M79.89 SWELLING OF LOWER LIMB: ICD-10-CM

## 2021-10-25 PROCEDURE — 97016 VASOPNEUMATIC DEVICE THERAPY: CPT

## 2021-10-25 PROCEDURE — 97140 MANUAL THERAPY 1/> REGIONS: CPT

## 2021-10-29 ENCOUNTER — HOSPITAL ENCOUNTER (EMERGENCY)
Facility: OTHER | Age: 66
Discharge: HOME | End: 2021-10-29
Attending: EMERGENCY MEDICINE
Payer: COMMERCIAL

## 2021-10-29 VITALS
SYSTOLIC BLOOD PRESSURE: 160 MMHG | RESPIRATION RATE: 18 BRPM | HEART RATE: 80 BPM | OXYGEN SATURATION: 99 % | WEIGHT: 310 LBS | DIASTOLIC BLOOD PRESSURE: 80 MMHG | HEIGHT: 67 IN | BODY MASS INDEX: 48.65 KG/M2 | TEMPERATURE: 98 F

## 2021-10-29 DIAGNOSIS — M79.669 CALF PAIN: ICD-10-CM

## 2021-10-29 DIAGNOSIS — M79.89 LEG SWELLING: ICD-10-CM

## 2021-10-29 DIAGNOSIS — M25.569 MEDIAL KNEE PAIN: ICD-10-CM

## 2021-10-29 DIAGNOSIS — M25.569 KNEE PAIN: ICD-10-CM

## 2021-10-29 LAB
ANION GAP SERPL CALC-SCNC: 7 MMOL/L (ref 8–16)
BASOPHILS # BLD AUTO: 0.02 K/UL (ref 0–0.2)
BASOPHILS NFR BLD: 0.2 % (ref 0–1.9)
BUN SERPL-MCNC: 11 MG/DL (ref 8–23)
CALCIUM SERPL-MCNC: 9 MG/DL (ref 8.7–10.5)
CHLORIDE SERPL-SCNC: 106 MMOL/L (ref 95–110)
CO2 SERPL-SCNC: 27 MMOL/L (ref 23–29)
CREAT SERPL-MCNC: 0.9 MG/DL (ref 0.5–1.4)
DIFFERENTIAL METHOD: ABNORMAL
EOSINOPHIL # BLD AUTO: 0.1 K/UL (ref 0–0.5)
EOSINOPHIL NFR BLD: 1.1 % (ref 0–8)
ERYTHROCYTE [DISTWIDTH] IN BLOOD BY AUTOMATED COUNT: 14.8 % (ref 11.5–14.5)
EST. GFR  (AFRICAN AMERICAN): >60 ML/MIN/1.73 M^2
EST. GFR  (NON AFRICAN AMERICAN): >60 ML/MIN/1.73 M^2
GLUCOSE SERPL-MCNC: 94 MG/DL (ref 70–110)
HCT VFR BLD AUTO: 41 % (ref 40–54)
HGB BLD-MCNC: 13 G/DL (ref 14–18)
IMM GRANULOCYTES # BLD AUTO: 0.03 K/UL (ref 0–0.04)
IMM GRANULOCYTES NFR BLD AUTO: 0.3 % (ref 0–0.5)
LYMPHOCYTES # BLD AUTO: 1.3 K/UL (ref 1–4.8)
LYMPHOCYTES NFR BLD: 14.9 % (ref 18–48)
MCH RBC QN AUTO: 27.2 PG (ref 27–31)
MCHC RBC AUTO-ENTMCNC: 31.7 G/DL (ref 32–36)
MCV RBC AUTO: 86 FL (ref 82–98)
MONOCYTES # BLD AUTO: 0.7 K/UL (ref 0.3–1)
MONOCYTES NFR BLD: 7.3 % (ref 4–15)
NEUTROPHILS # BLD AUTO: 6.8 K/UL (ref 1.8–7.7)
NEUTROPHILS NFR BLD: 76.2 % (ref 38–73)
NRBC BLD-RTO: 0 /100 WBC
PLATELET # BLD AUTO: 248 K/UL (ref 150–450)
PMV BLD AUTO: 9.5 FL (ref 9.2–12.9)
POTASSIUM SERPL-SCNC: 4.4 MMOL/L (ref 3.5–5.1)
RBC # BLD AUTO: 4.78 M/UL (ref 4.6–6.2)
SODIUM SERPL-SCNC: 140 MMOL/L (ref 136–145)
URATE SERPL-MCNC: 4.3 MG/DL (ref 3.4–7)
WBC # BLD AUTO: 8.93 K/UL (ref 3.9–12.7)

## 2021-10-29 PROCEDURE — 99285 EMERGENCY DEPT VISIT HI MDM: CPT | Mod: 25

## 2021-10-29 PROCEDURE — 85025 COMPLETE CBC W/AUTO DIFF WBC: CPT | Performed by: NURSE PRACTITIONER

## 2021-10-29 PROCEDURE — 96374 THER/PROPH/DIAG INJ IV PUSH: CPT

## 2021-10-29 PROCEDURE — 96375 TX/PRO/DX INJ NEW DRUG ADDON: CPT

## 2021-10-29 PROCEDURE — 84550 ASSAY OF BLOOD/URIC ACID: CPT | Performed by: NURSE PRACTITIONER

## 2021-10-29 PROCEDURE — 80048 BASIC METABOLIC PNL TOTAL CA: CPT | Performed by: NURSE PRACTITIONER

## 2021-10-29 PROCEDURE — 63600175 PHARM REV CODE 636 W HCPCS: Performed by: NURSE PRACTITIONER

## 2021-10-29 RX ORDER — NAPROXEN 500 MG/1
500 TABLET ORAL EVERY 12 HOURS PRN
Qty: 10 TABLET | Refills: 0 | Status: ON HOLD | OUTPATIENT
Start: 2021-10-29 | End: 2022-06-04 | Stop reason: HOSPADM

## 2021-10-29 RX ORDER — KETOROLAC TROMETHAMINE 30 MG/ML
15 INJECTION, SOLUTION INTRAMUSCULAR; INTRAVENOUS
Status: COMPLETED | OUTPATIENT
Start: 2021-10-29 | End: 2021-10-29

## 2021-10-29 RX ORDER — METHYLPREDNISOLONE SOD SUCC 125 MG
125 VIAL (EA) INJECTION
Status: COMPLETED | OUTPATIENT
Start: 2021-10-29 | End: 2021-10-29

## 2021-10-29 RX ADMIN — METHYLPREDNISOLONE SODIUM SUCCINATE 125 MG: 125 INJECTION, POWDER, LYOPHILIZED, FOR SOLUTION INTRAMUSCULAR; INTRAVENOUS at 04:10

## 2021-10-29 RX ADMIN — KETOROLAC TROMETHAMINE 15 MG: 30 INJECTION, SOLUTION INTRAMUSCULAR at 04:10

## 2021-11-17 ENCOUNTER — CLINICAL SUPPORT (OUTPATIENT)
Dept: REHABILITATION | Facility: HOSPITAL | Age: 66
End: 2021-11-17
Payer: COMMERCIAL

## 2021-11-17 DIAGNOSIS — I89.0 LYMPHEDEMA OF BOTH LOWER EXTREMITIES: Primary | ICD-10-CM

## 2021-11-17 DIAGNOSIS — I87.2 CHRONIC VENOUS INSUFFICIENCY: ICD-10-CM

## 2021-11-17 DIAGNOSIS — M79.89 SWELLING OF LOWER LIMB: ICD-10-CM

## 2021-11-17 DIAGNOSIS — E66.01 MORBID OBESITY: ICD-10-CM

## 2021-11-17 DIAGNOSIS — R60.0 EDEMA OF BOTH LOWER EXTREMITIES: ICD-10-CM

## 2021-11-17 DIAGNOSIS — E11.42 TYPE 2 DIABETES MELLITUS WITH DIABETIC POLYNEUROPATHY, UNSPECIFIED WHETHER LONG TERM INSULIN USE: ICD-10-CM

## 2021-11-17 PROCEDURE — 97140 MANUAL THERAPY 1/> REGIONS: CPT

## 2021-12-02 ENCOUNTER — OFFICE VISIT (OUTPATIENT)
Dept: CARDIOLOGY | Facility: CLINIC | Age: 66
End: 2021-12-02
Payer: COMMERCIAL

## 2021-12-02 VITALS
HEART RATE: 85 BPM | SYSTOLIC BLOOD PRESSURE: 144 MMHG | DIASTOLIC BLOOD PRESSURE: 80 MMHG | WEIGHT: 315 LBS | BODY MASS INDEX: 49.44 KG/M2 | OXYGEN SATURATION: 96 % | HEIGHT: 67 IN

## 2021-12-02 DIAGNOSIS — E78.2 MIXED HYPERLIPIDEMIA: ICD-10-CM

## 2021-12-02 DIAGNOSIS — I10 PRIMARY HYPERTENSION: ICD-10-CM

## 2021-12-02 DIAGNOSIS — I45.10 RBBB: Primary | ICD-10-CM

## 2021-12-02 PROCEDURE — 4010F PR ACE/ARB THEARPY RXD/TAKEN: ICD-10-PCS | Mod: CPTII,S$GLB,, | Performed by: INTERNAL MEDICINE

## 2021-12-02 PROCEDURE — 99214 OFFICE O/P EST MOD 30 MIN: CPT | Mod: S$GLB,,, | Performed by: INTERNAL MEDICINE

## 2021-12-02 PROCEDURE — 99999 PR PBB SHADOW E&M-EST. PATIENT-LVL III: CPT | Mod: PBBFAC,,, | Performed by: INTERNAL MEDICINE

## 2021-12-02 PROCEDURE — 99999 PR PBB SHADOW E&M-EST. PATIENT-LVL III: ICD-10-PCS | Mod: PBBFAC,,, | Performed by: INTERNAL MEDICINE

## 2021-12-02 PROCEDURE — 99214 PR OFFICE/OUTPT VISIT, EST, LEVL IV, 30-39 MIN: ICD-10-PCS | Mod: S$GLB,,, | Performed by: INTERNAL MEDICINE

## 2021-12-02 PROCEDURE — 93010 ELECTROCARDIOGRAM REPORT: CPT | Mod: S$GLB,,, | Performed by: INTERNAL MEDICINE

## 2021-12-02 PROCEDURE — 4010F ACE/ARB THERAPY RXD/TAKEN: CPT | Mod: CPTII,S$GLB,, | Performed by: INTERNAL MEDICINE

## 2021-12-02 PROCEDURE — 93010 EKG 12-LEAD: ICD-10-PCS | Mod: S$GLB,,, | Performed by: INTERNAL MEDICINE

## 2021-12-02 PROCEDURE — 93005 ELECTROCARDIOGRAM TRACING: CPT

## 2021-12-27 ENCOUNTER — CLINICAL SUPPORT (OUTPATIENT)
Dept: REHABILITATION | Facility: HOSPITAL | Age: 66
End: 2021-12-27
Payer: COMMERCIAL

## 2021-12-27 DIAGNOSIS — I89.0 LYMPHEDEMA OF BOTH LOWER EXTREMITIES: Primary | ICD-10-CM

## 2021-12-27 DIAGNOSIS — E66.01 MORBID OBESITY: ICD-10-CM

## 2021-12-27 DIAGNOSIS — M79.89 SWELLING OF LOWER LIMB: ICD-10-CM

## 2021-12-27 DIAGNOSIS — I87.2 CHRONIC VENOUS INSUFFICIENCY: ICD-10-CM

## 2021-12-27 DIAGNOSIS — R60.0 EDEMA OF BOTH LOWER EXTREMITIES: ICD-10-CM

## 2021-12-27 PROCEDURE — 97535 SELF CARE MNGMENT TRAINING: CPT

## 2021-12-27 PROCEDURE — 97140 MANUAL THERAPY 1/> REGIONS: CPT

## 2021-12-28 ENCOUNTER — PATIENT MESSAGE (OUTPATIENT)
Dept: ADMINISTRATIVE | Facility: OTHER | Age: 66
End: 2021-12-28
Payer: COMMERCIAL

## 2021-12-28 ENCOUNTER — PATIENT MESSAGE (OUTPATIENT)
Dept: PRIMARY CARE CLINIC | Facility: CLINIC | Age: 66
End: 2021-12-28
Payer: COMMERCIAL

## 2021-12-28 DIAGNOSIS — J34.89 RHINORRHEA: Primary | ICD-10-CM

## 2021-12-28 DIAGNOSIS — R05.9 COUGH: ICD-10-CM

## 2021-12-28 DIAGNOSIS — R68.83 CHILLS: ICD-10-CM

## 2021-12-29 ENCOUNTER — PATIENT MESSAGE (OUTPATIENT)
Dept: ADMINISTRATIVE | Facility: OTHER | Age: 66
End: 2021-12-29
Payer: COMMERCIAL

## 2021-12-29 ENCOUNTER — PATIENT MESSAGE (OUTPATIENT)
Dept: PRIMARY CARE CLINIC | Facility: CLINIC | Age: 66
End: 2021-12-29
Payer: COMMERCIAL

## 2021-12-29 DIAGNOSIS — U07.1 COVID-19: Primary | ICD-10-CM

## 2021-12-29 RX ORDER — PROMETHAZINE HYDROCHLORIDE AND DEXTROMETHORPHAN HYDROBROMIDE 6.25; 15 MG/5ML; MG/5ML
5 SYRUP ORAL EVERY 4 HOURS PRN
Qty: 118 ML | Refills: 0 | Status: SHIPPED | OUTPATIENT
Start: 2021-12-29 | End: 2022-01-12 | Stop reason: SDUPTHER

## 2021-12-30 ENCOUNTER — PATIENT MESSAGE (OUTPATIENT)
Dept: PRIMARY CARE CLINIC | Facility: CLINIC | Age: 66
End: 2021-12-30
Payer: COMMERCIAL

## 2022-01-11 ENCOUNTER — CLINICAL SUPPORT (OUTPATIENT)
Dept: REHABILITATION | Facility: HOSPITAL | Age: 67
End: 2022-01-11
Payer: COMMERCIAL

## 2022-01-11 DIAGNOSIS — R60.0 EDEMA OF BOTH LOWER EXTREMITIES: ICD-10-CM

## 2022-01-11 DIAGNOSIS — I89.0 LYMPHEDEMA OF BOTH LOWER EXTREMITIES: Primary | ICD-10-CM

## 2022-01-11 DIAGNOSIS — M79.89 SWELLING OF LOWER LIMB: ICD-10-CM

## 2022-01-11 DIAGNOSIS — E66.01 MORBID OBESITY: ICD-10-CM

## 2022-01-11 DIAGNOSIS — I87.2 CHRONIC VENOUS INSUFFICIENCY: ICD-10-CM

## 2022-01-11 PROCEDURE — 97140 MANUAL THERAPY 1/> REGIONS: CPT | Mod: CQ

## 2022-01-11 NOTE — PROGRESS NOTES
Physical Therapy Daily Treatment Note     Name: Jono Gonzalez  Clinic Number: [id number]    Therapy Diagnosis:   Encounter Diagnoses   Name Primary?    Swelling of lower limb     Lymphedema of both lower extremities Yes    Edema of both lower extremities     Chronic venous insufficiency     Morbid obesity      Physician: Ki Ashby MD*    Visit Date: 1/11/2022    Physician Orders: PT Eval and Treat - lymphedema  Medical Diagnosis from Referral:   Diagnosis   I89.0 (ICD-10-CM) - Lymphedema of both lower extremities      Evaluation Date: 10/4/2021  Authorization Period Expiration: 12/1/22  Plan of Care Expiration: 12/27/21- send as POC update MD visit 1/4/22  Visit # / Visits authorized: 1/20- new auth ( 8 total )     Time In: 4:00 pm  Time Out: 5:00 pm  Total Billable Time: 60 minutes     Precautions: Standard, Diabetes and obesity, CVI, HTN    Subjective     Pt reports: had Covid last week and is doing better today , tested negative yesterday . Pt notes a slight tear in his right meniscus , his doctor wants him to wear a knee brace and has also started PT and is hoping to avoid surgery. Pt stated he gets fitted for his knee brace on Thursday .   Pt stated swelling has not been bad because he was resting in bed due to Covid. He reports wearing his compression when he gets out of bed.     He was compliant with home exercise program.  Response to previous treatment: several missed visits due to R knee pain.    Pt is working today- 's education.  Functional change: amb with cane due to knee pain- slightly better and now having PT. Did get approval for knee brace.    Pain: 8/10- varies R knee  Location: BLEs , L>R swelling    Objective     Obese male, has knee high 20-30mmHg compression Mediven Plus IV  tennis shoes- amb with str cane- antalgic gait R knee  Amount of Swelling/Location of Swelling: marked venous stasis changes, dermatitis B Lower legs L > R, mod to lower legs, ankles, feet,  Obesity  fullness and body habitus of thighs and abdominal bulk   Skin Integrity: dry, discolored, hemosiderin staining, past healed ulcerations L leg, fibrotic changes,very dry heels, yellow thick nails  Circular mole / callous R tarsal region  L great toe nail darkened, coarse, states tender to pressure - advised on signs/symptoms of infection and need to contact MD as warranted.   Palpation/Texture: dense firmness, pitting ankles to lower legs, fibrosis   + L Stemmer Sign  -B Lissa's Sign  Circulation: intact,  Mild PAD reported per chart     Girth Measurements (in centimeters) pants pulled above knees  LANDMARK LEFT LE  10/4/21 L LE  10/25/21 L LE  12/27/21 L LE  change RIGHT LE  10/4/21 R LE  10/25/21 Change  R DIFF   at eval   SBP 57.0 cm 55.5 57.0 1.5 55.0 cm 53.5 1.5 2.0 cm   10 below SBP 45.0 cm 43.0 43.5 2.0 44.0 cm 43.0 1.0 1.0 cm   20 below SBP 46.0 cm 45.5 45.5 0.5 45.0 cm 44.0 1.0 1.0 cm   30 below SBP 40.0 cm 40.0 40.5 0 38.0 cm 36.0 2.0 2.0 cm   35 below SBP 36.0 cm 36.5 35.5 0.5 33.0 cm 31.0 2.0 3.0 cm   Ankle 33.0 cm 29.5 31.0 3.5 33.0 cm 29.0 4.0 0 cm   Forefoot 25.5 cm 24.5 26.0 1.0 25.5 cm 24.5 1.0 0 cm      Treatment:   Jono received the following manual therapy techniques:- Manual Lymphatic Drainage were applied to the: L LE for 55 minutes, including: MLD and short stretch compression bandaging   Due to knee pain - did not address R LE- reapplied R garment end of session  small towel roll under R knee    MANUAL LYMPHATIC DRAINAGE (MLD):    While supine with LEs elevated stimulation at terminus, along GI region, B inguinal regions, drainage of entire LLE torsten lower leg, ankle, and foot with return proximally,  Use of Aquaphor due to dryness.   Educated in self massage to abdominal areas, B inguinal areas, thigh, and remaining LE within reach.    SEQUENTIAL COMPRESSION PUMP (untimed)  Not performed - question tolerance R knee and now L great toe nail sore to pressure     MULTILAYERED BANDAGING: issued  supplies and bandaged LLE with cotton stockinette, Rosidal soft section dorsum of foot, 2 komprex wedges post malleoli, 1 Rosidal soft rolls ankle to knee, 1-8cm and 2- 10cm Rosidal K rolls foot to knee, to leave intact 12-24 hrs as tolerated, discontinue with any problems, return rolled bandages next session. Wash and wear schedules confirmed.     Garment to R LE- advised to apply compression as able - pt is following with ortho  Demo of don assist device - fair success - difficulty bending over and reaching feet- abdominal bulk and knee flexion  Discussed fit and position    Advised on ortho management of knee and also to manage swelling regularly - if orthopedic surgery is required want to minimize post op edema and complications from swelling.    Jono received therapeutic exercises to develop strength, endurance, ROM and flexibility verbal review  Ortho for R knee  Aps, knee ROM, avoid dependency, avoid immobility, elevation, walking with compression, deep breathing, use of muscle pump to assist venous return    Home Exercises Provided and Patient Education Provided: x 5min    Discussed compliance with attendance   Need for weight and diet management to support TYREE TRAN  Consideration for podiatry for nail/foot care due to DM and recent L great toe soreness  Confirmed signs/symptoms of infection and seek medical attention as warranted  R knee per Ortho- having PT and surgical considerations.  Pt has B LE KH 20-30mmHg Mediven Plus IV- hard to get on especially R    Last session:   Demo of KH 20-30mmHg  Demo of Velcro Inelastic Wraps- don/doff, fit, position, class  Sent request to MD TYREE TRAN knee high 20-30mmHg  B LE Velcro Inelastic Wraps    Vendor list and DME options for insurance coverage or OOP cost depending on plan.    Compression garments don/doff, use of gloves-  Plan of care and long term management discussed  Class, fit, and size based on measurements  Wear daily.  Mediven appear to fit well- don't over  stretch length    PATIENT/FAMILY Education: bandaging wear schedule,  HEP,  Beginning of self massage,  Self or assisted bandaging, compression options, and Risk reduction    Written Home Exercises Provided: yes.  Home plan of management was reviewed and Jono was able to demonstrate understanding prior to the end of the session.  Jono demonstrated good  understanding of the education provided.     Assessment     Jono is a 66 y.o. male referred to outpatient Physical Therapy with a medical diagnosis of   Diagnosis   I89.0 (ICD-10-CM) - Lymphedema of both lower extremities      This patient presents s/p h/o lumbar surgeries, obesity, CVI with past wounds, DM, mild PAD   resulting in: secondary multifactorial lymphedema of the B LE L > R complicated by obesity, knee pain likely OA, increased pain, increased stiffness in the knees, ankles, as well as difficulty performing walking, mobility, compression needs, and placing the pt at higher risk of infection.     Pt continues with obesity, HTN, CVI, DM with only slight decreases in density and girth- only fair attendance and with orthopedic following due to R knee pain and meniscus tear. Left LE with chronic lymphedema tissue changes with related CVI and obesity contributions. Pt also voices improvement in L great toe pain following receiving a pedicure last month although was encouraged should still f/u and get established with podiatry due to high risk of infection and DM.   Advised therapy will assist in management plan and compliance with attendance and compression is required as well as addressing other medical and lifestyle choices to support.    Will consider following for 2-3 weeks with plan to seek more compression support.     Jono is progressing well towards his goals.   Pt prognosis is Fair.     Pt will continue to benefit from skilled outpatient physical therapy to address the deficits listed in the problem list box on initial evaluation, provide  pt/family education and to maximize pt's level of independence in the home and community environment.   Pt's spiritual, cultural and educational needs considered and pt agreeable to plan of care and goals.     Anticipated Barriers for therapy: chronicity, severity, obesity, work     The following goals were discussed with the patient and patient is in agreement with them as to be addressed in the treatment plan.      Short Term Goals: (6 weeks)  1. Patient will show decreased girth in B LE by up to 1 cm to allow for LE symmetry, shoe and clothing choice, and ability to apply needed compression.  ( met- varies)   2. Patient will demonstrate 100% knowledge of lymphedema precautions and signs of infection to allow for reduced lymphedema risk, infection risk, and/or exacerbation of condition.   met)  3. Patient or caregiver will perform self-bandaging techniques and/or wearing of compression garments to allow for lymphatic drainage support, skin elasticity, and reduction in shape and size of limb.  met)  4. Patient will perform self lymph drainage techniques to areas within reach to enhance lymphatic drainage and skin condition.  (progressing,)  5. Patient will tolerate daily activities with multilayered bandaging to allow for lymphatic and venous support.  (met)     Long Term Goals: (12  weeks)  1. Patient will show decreased girth in B LE by up to 2 cm  to allow for LE symmetry, shoe and clothing choice, and ability to apply needed compression daily.  (progressing, met in some areas  2. Patient will show reduction in density to mild or less with improved contour of limb to allow for cosmesis, LE symmetry, infection risk reduction, and clothing and compression choice.   (progressing,)  3. Patient to tayla/doff compression garment with daily compliance to assist in lymphedema management, skin elasticity, and tissue density.  met)  4. Pt to show improved postural awareness and alignment.  (progressing,  5. Pt to be I and  compliant with HEP to allow for increased function in affected limb.   (progressing)    Plan   Plan of care Certification: 10/4/2021 to 12/27/21.- sent request to update     Outpatient Physical Therapy decrease to 1 times weekly for 10 weeks to include the following interventions: Patient Education, Self Care, Therapeutic Activites and Therapeutic Exercise. Complete Decongestive Therapy- compression and home equipment needs to be addressed and assisted.    Sharlene Molina, PTA

## 2022-01-12 ENCOUNTER — DOCUMENTATION ONLY (OUTPATIENT)
Dept: REHABILITATION | Facility: HOSPITAL | Age: 67
End: 2022-01-12
Payer: COMMERCIAL

## 2022-01-12 NOTE — PROGRESS NOTES
PT/PTA met face to face to discuss pt's treatment plan and progress towards established goals. Pt will be seen by a physical therapist minimally every 6th visit or every 30 days.      Sharlene Molina PTA

## 2022-01-27 ENCOUNTER — OFFICE VISIT (OUTPATIENT)
Dept: CARDIOLOGY | Facility: CLINIC | Age: 67
End: 2022-01-27
Payer: COMMERCIAL

## 2022-01-27 VITALS
HEART RATE: 92 BPM | DIASTOLIC BLOOD PRESSURE: 82 MMHG | SYSTOLIC BLOOD PRESSURE: 150 MMHG | HEIGHT: 67 IN | BODY MASS INDEX: 49.44 KG/M2 | WEIGHT: 315 LBS | OXYGEN SATURATION: 96 %

## 2022-01-27 DIAGNOSIS — I45.10 RBBB: ICD-10-CM

## 2022-01-27 DIAGNOSIS — E11.9 DIABETES MELLITUS WITHOUT COMPLICATION: ICD-10-CM

## 2022-01-27 DIAGNOSIS — I89.0 LYMPHEDEMA: Primary | ICD-10-CM

## 2022-01-27 DIAGNOSIS — E66.01 MORBID OBESITY: ICD-10-CM

## 2022-01-27 DIAGNOSIS — E78.2 MIXED HYPERLIPIDEMIA: ICD-10-CM

## 2022-01-27 DIAGNOSIS — I10 PRIMARY HYPERTENSION: ICD-10-CM

## 2022-01-27 DIAGNOSIS — I87.2 CHRONIC VENOUS INSUFFICIENCY: ICD-10-CM

## 2022-01-27 PROCEDURE — 99999 PR PBB SHADOW E&M-EST. PATIENT-LVL V: CPT | Mod: PBBFAC,,, | Performed by: INTERNAL MEDICINE

## 2022-01-27 PROCEDURE — 3077F PR MOST RECENT SYSTOLIC BLOOD PRESSURE >= 140 MM HG: ICD-10-PCS | Mod: CPTII,S$GLB,, | Performed by: INTERNAL MEDICINE

## 2022-01-27 PROCEDURE — 3077F SYST BP >= 140 MM HG: CPT | Mod: CPTII,S$GLB,, | Performed by: INTERNAL MEDICINE

## 2022-01-27 PROCEDURE — 3008F BODY MASS INDEX DOCD: CPT | Mod: CPTII,S$GLB,, | Performed by: INTERNAL MEDICINE

## 2022-01-27 PROCEDURE — 99215 OFFICE O/P EST HI 40 MIN: CPT | Mod: S$GLB,,, | Performed by: INTERNAL MEDICINE

## 2022-01-27 PROCEDURE — 3072F LOW RISK FOR RETINOPATHY: CPT | Mod: CPTII,S$GLB,, | Performed by: INTERNAL MEDICINE

## 2022-01-27 PROCEDURE — 99999 PR PBB SHADOW E&M-EST. PATIENT-LVL V: ICD-10-PCS | Mod: PBBFAC,,, | Performed by: INTERNAL MEDICINE

## 2022-01-27 PROCEDURE — 3288F FALL RISK ASSESSMENT DOCD: CPT | Mod: CPTII,S$GLB,, | Performed by: INTERNAL MEDICINE

## 2022-01-27 PROCEDURE — 3072F PR LOW RISK FOR RETINOPATHY: ICD-10-PCS | Mod: CPTII,S$GLB,, | Performed by: INTERNAL MEDICINE

## 2022-01-27 PROCEDURE — 1101F PT FALLS ASSESS-DOCD LE1/YR: CPT | Mod: CPTII,S$GLB,, | Performed by: INTERNAL MEDICINE

## 2022-01-27 PROCEDURE — 99215 PR OFFICE/OUTPT VISIT, EST, LEVL V, 40-54 MIN: ICD-10-PCS | Mod: S$GLB,,, | Performed by: INTERNAL MEDICINE

## 2022-01-27 PROCEDURE — 3079F PR MOST RECENT DIASTOLIC BLOOD PRESSURE 80-89 MM HG: ICD-10-PCS | Mod: CPTII,S$GLB,, | Performed by: INTERNAL MEDICINE

## 2022-01-27 PROCEDURE — 1125F PR PAIN SEVERITY QUANTIFIED, PAIN PRESENT: ICD-10-PCS | Mod: CPTII,S$GLB,, | Performed by: INTERNAL MEDICINE

## 2022-01-27 PROCEDURE — 1159F PR MEDICATION LIST DOCUMENTED IN MEDICAL RECORD: ICD-10-PCS | Mod: CPTII,S$GLB,, | Performed by: INTERNAL MEDICINE

## 2022-01-27 PROCEDURE — 3288F PR FALLS RISK ASSESSMENT DOCUMENTED: ICD-10-PCS | Mod: CPTII,S$GLB,, | Performed by: INTERNAL MEDICINE

## 2022-01-27 PROCEDURE — 3008F PR BODY MASS INDEX (BMI) DOCUMENTED: ICD-10-PCS | Mod: CPTII,S$GLB,, | Performed by: INTERNAL MEDICINE

## 2022-01-27 PROCEDURE — 1101F PR PT FALLS ASSESS DOC 0-1 FALLS W/OUT INJ PAST YR: ICD-10-PCS | Mod: CPTII,S$GLB,, | Performed by: INTERNAL MEDICINE

## 2022-01-27 PROCEDURE — 1125F AMNT PAIN NOTED PAIN PRSNT: CPT | Mod: CPTII,S$GLB,, | Performed by: INTERNAL MEDICINE

## 2022-01-27 PROCEDURE — 1159F MED LIST DOCD IN RCRD: CPT | Mod: CPTII,S$GLB,, | Performed by: INTERNAL MEDICINE

## 2022-01-27 PROCEDURE — 3079F DIAST BP 80-89 MM HG: CPT | Mod: CPTII,S$GLB,, | Performed by: INTERNAL MEDICINE

## 2022-01-27 RX ORDER — AMLODIPINE BESYLATE 10 MG/1
10 TABLET ORAL DAILY
COMMUNITY
Start: 2021-11-28 | End: 2023-01-10

## 2022-01-27 NOTE — PROGRESS NOTES
[facility]      Chief Complaint   Patient presents with    Lymphedema of both lower extremities       Patient ID: Jono Gonzalez is a 66 y.o. male with chronic venous insufficiency, BLE lymphedema, DM2, HTN, HLD, DAVID (on CPAP), morbid obesity, who presents for a follow up appointment.  Pertinent history/events are as follows:     -Pt kindly referred by Dr. Linn for evaluation of chronic venous insufficiency.    -At our initial clinic visit on 5/24/2021, Mr. Gonzalez reported leg swelling starting 6 years ago.  States swelling is controlled with fluid pills and compression stocking.  No claudication or tissue loss.  Exam shows BLE's with 1+ pitting edema, venous stasis dermatitis, and changes consistent with lymphedema (L>R).   Plan:   Chronic venous insufficiency with Lymphedema- Exam shows BLE's with 1+ pitting edema, venous stasis dermatitis, and changes consistent with lymphedema (L>R).  Check BLE venous reflux study and segmental pressure study.  Refer to lymphedema clinic.  Continue lasix 40 mg daily.  Limit sodium intake to 2,000 mg daily.  Limit volume intake to 1.5 liters daily.  Pt will benefit from significant weight loss.  Morbid Obesity- Encourage diet, exercise and weight loss.      -At follow up clinic visit on 7/20/2021, Mr. Gonzalez reported significant improvement in BLE edema since clinic visit on 5/24/2021.  Plan:   Chronic venous insufficiency with Lymphedema- Improving.  Check BLE venous reflux study and segmental pressure study.  Refer to lymphedema clinic.  Continue lasix 40 mg daily.  Limit sodium intake to 2,000 mg daily.  Limit volume intake to 1.5 liters daily.  Pt will benefit from significant weight loss.  Morbid Obesity- Encourage diet, exercise and weight loss.      10/21/2021 clinic visit: Mr. Gonzalez reports continued improvement in BLE edema since clinic visit on 7/20/2021.  He is now participating in lymphedema clinic.      Plan:  Chronic venous insufficiency with  Lymphedema- Improving.  Continue lymphedema clinic therapy.  Continue lasix 40 mg daily.  Limit sodium intake to 2,000 mg daily.  Limit volume intake to 1.5 liters daily.  Pt will benefit from significant weight loss.  Morbid Obesity- Encourage diet, exercise and weight loss.    HTN- Continue current medications.  HLD- Continue atorvastatin 40 mg daily.  Check updated lipid panel.  DAVID- Continue CPAP nightly.     HPI:  Mr. Gonzalez reports feeling well. He is compliant with compression stockings. He is following with the lymphedema clinic. He is taking lasix. He drinks 3-4 bottles of water per day. He has no other complaints.      Past Medical History:   Diagnosis Date    Diabetes mellitus     Gout attack     Hypertension      Past Surgical History:   Procedure Laterality Date    BACK SURGERY      lumbar fusion      X 2     Social History     Socioeconomic History    Marital status:    Tobacco Use    Smoking status: Never Smoker    Smokeless tobacco: Never Used   Substance and Sexual Activity    Alcohol use: Yes     Comment: occ    Drug use: No     Family History   Problem Relation Age of Onset    Diabetes Mother     Heart disease Mother     Thyroid disease Mother     Diabetes Sister     Heart disease Father     Diabetes Father     Heart disease Brother     Heart disease Sister     Diabetes Sister     Stroke Sister        Review of patient's allergies indicates:  No Known Allergies    Medication List with Changes/Refills   Current Medications    ALLOPURINOL (ZYLOPRIM) 300 MG TABLET    Take 1 tablet (300 mg total) by mouth once daily.    ANASTROZOLE (ARIMIDEX) 1 MG TAB    Take 1 mg by mouth once daily.    ATORVASTATIN (LIPITOR) 40 MG TABLET    Take 1 tablet (40 mg total) by mouth every evening. For cholesterol    FINASTERIDE (PROSCAR) 5 MG TABLET    TK 1 T PO QD    FUROSEMIDE (LASIX) 40 MG TABLET    Take 40 mg by mouth once daily.    HYDROCODONE-ACETAMINOPHEN 5-325MG (NORCO) 5-325 MG PER  "TABLET    TK 1 T PO  Q 6 TO 8 H    IBUPROFEN (ADVIL,MOTRIN) 600 MG TABLET    TAKE 1 TABLET(600 MG) BY MOUTH EVERY 6 HOURS WITH FOOD AS NEEDED FOR PAIN OR INFLAMMATION    METFORMIN (GLUCOPHAGE) 500 MG TABLET    Take 500 mg by mouth 2 (two) times daily with meals.    NAPROXEN (NAPROSYN) 500 MG TABLET    Take 1 tablet (500 mg total) by mouth every 12 (twelve) hours as needed (pain). Take with food.    OLMESARTAN (BENICAR) 40 MG TABLET    Take 1 tablet (40 mg total) by mouth once daily. Blood pressure    PREGABALIN (LYRICA) 75 MG CAPSULE    Take 1 capsule (75 mg total) by mouth once daily.    TAMSULOSIN (FLOMAX) 0.4 MG CP24    TK 1 C PO BID       Review of Systems  Constitution: Denies chills, fever, and sweats.  HENT: Denies headaches or blurry vision.  Cardiovascular: Denies chest pain or irregular heart beat.  Respiratory: Denies cough or shortness of breath.  Gastrointestinal: Denies abdominal pain, nausea, or vomiting.  Musculoskeletal: Positive for leg swelling.  Neurological: Denies dizziness or focal weakness.  Psychiatric/Behavioral: Normal mental status.  Hematologic/Lymphatic: Denies bleeding problem or easy bruising/bleeding.  Skin: Denies rash or suspicious lesions    Physical Examination  Ht 5' 7" (1.702 m)   Wt (!) 145.9 kg (321 lb 10.4 oz)   BMI 50.38 kg/m²     Constitutional: No acute distress, conversant  HEENT: Sclera anicteric, Pupils equal, round and reactive to light, extraocular motions intact, Oropharynx clear  Neck: No JVD, no carotid bruits  Cardiovascular: regular rate and rhythm, no murmur, rubs or gallops, normal S1/S2  Pulmonary: Clear to auscultation bilaterally  Abdominal: Abdomen soft, nontender, nondistended, positive bowel sounds  Extremities: BLE's with 1+ pitting edema, venous stasis dermatitis, and changes consistent with lymphedema (L>R).    Pulses:  Carotid pulses are 2+ on the right side, and 2+ on the left side.  Radial pulses are 2+ on the right side, and 2+ on the left side. "   Femoral pulses are 2+ on the right side, and 2+ on the left side.  Popliteal pulses are 2+ on the right side, and 2+ on the left side.   Dorsalis pedis pulses are 2+ on the right side, and 2+ on the left side.   Posterior tibial pulses are 2+ on the right side, and 2+ on the left side.    Skin: No ecchymosis, erythema, or ulcers  Psych: Alert and oriented x 3, appropriate affect  Neuro: CNII-XII intact, no focal deficits    Labs:  Most Recent Data  CBC:   Lab Results   Component Value Date    WBC 8.93 10/29/2021    HGB 13.0 (L) 10/29/2021    HCT 41.0 10/29/2021     10/29/2021    MCV 86 10/29/2021    RDW 14.8 (H) 10/29/2021     BMP:   Lab Results   Component Value Date     10/29/2021    K 4.4 10/29/2021     10/29/2021    CO2 27 10/29/2021    BUN 11 10/29/2021    CREATININE 0.9 10/29/2021    GLU 94 10/29/2021    CALCIUM 9.0 10/29/2021    MG 2.3 03/30/2006     LFTS;   Lab Results   Component Value Date    PROT 6.4 09/16/2014    ALBUMIN 3.1 (L) 09/16/2014    BILITOT 0.5 09/16/2014    AST 14 09/16/2014    ALKPHOS 66 09/16/2014    ALT 14 09/16/2014     COAGS:   Lab Results   Component Value Date    INR 1.0 03/29/2006     FLP:   Lab Results   Component Value Date    CHOL 177 03/30/2006    HDL 38.0 (L) 03/30/2006    LDLCALC 117.4 03/30/2006    TRIG 108 03/30/2006    CHOLHDL 21.5 03/30/2006     CARDIAC: No results found for: TROPONINI, CKMB, BNP    PET Stress Test 5/12/2021:    The relative PET images are normal showing no clinically significant regional resting or stress induced perfusion defects.    The whole heart absolute myocardial perfusion values averaged 1.37 cc/min/g at rest, which is elevated; 1.39 cc/min/g at stress, which is mildly reduced; and CFR is 1.02 , which is severely reduced in part due to elevated resting flow.    The gated perfusion images showed an ejection fraction of 75% at rest and 78% during stress. A normal ejection fraction is greater than 51%.    The wall motion is  normal at rest and during stress.    The LV cavity size is normal at rest and stress.    The EKG portion of this study is negative for ischemia.    There were no arrhythmias during stress.     The patient reported no chest pain during the stress test.    There are no prior studies for comparison.    Assessment/Plan:  Jono Gonzalez is a 66 y.o. male with chronic venous insufficiency, BLE venous insufficiency, BLE venous insufficiency, DM2, HTN, HLD, DAVID (on CPAP), morbid obesity, who presents for a follow up appointment    1. Chronic venous insufficiency with Lymphedema- Improving.  Continue lymphedema clinic therapy.  Continue lasix 40 mg daily.  Limit sodium intake to 2,000 mg daily.  Limit volume intake to 1.5 liters daily.  Pt will benefit from significant weight loss.    2. Morbid Obesity- Encourage diet, exercise and weight loss. Although A1c at goal for age, he may benefit from GLP-1 agonist for weight loss. Refer to endocrinology. Refer to bariatric surgery/medicine.    3. HTN- Continue current medications.    4. HLD- Continue atorvastatin 40 mg daily.  Check updated lipid panel.     5. DAVID- Continue CPAP nightly.      Follow up in 3 months    Total duration of face to face visit time 30 minutes.  Total time spent counseling greater than fifty percent of total visit time.  Counseling included discussion regarding imaging findings, diagnosis, possibilities, treatment options, risks and benefits.  The patient had many questions regarding the options and long-term effects.    Joselyn Beverly MD  Vascular Medicine Fellow    Patient seen and discussed with Dr. Ashby

## 2022-01-27 NOTE — PATIENT INSTRUCTIONS
Assessment/Plan:  Jono Gonzalez is a 66 y.o. male with chronic venous insufficiency, BLE venous insufficiency, BLE venous insufficiency, DM2, HTN, HLD, DAVID (on CPAP), morbid obesity, who presents for a follow up appointment    1. Chronic venous insufficiency with Lymphedema- Improving.  Continue lymphedema clinic therapy.  Continue lasix 40 mg daily.  Limit sodium intake to 2,000 mg daily.  Limit volume intake to 1.5 liters daily.  Pt will benefit from significant weight loss.    2. Morbid Obesity- Encourage diet, exercise and weight loss. Although A1c at goal for age, he may benefit from GLP-1 agonist for weight loss. Refer to endocrinology. Refer to bariatric surgery/medicine.    3. HTN- Continue current medications.    4. HLD- Continue atorvastatin 40 mg daily.  Check updated lipid panel.     5. DAVID- Continue CPAP nightly.      Follow up in 3 months

## 2022-02-08 ENCOUNTER — LAB VISIT (OUTPATIENT)
Dept: LAB | Facility: HOSPITAL | Age: 67
End: 2022-02-08
Payer: COMMERCIAL

## 2022-02-08 ENCOUNTER — OFFICE VISIT (OUTPATIENT)
Dept: ENDOCRINOLOGY | Facility: CLINIC | Age: 67
End: 2022-02-08
Payer: COMMERCIAL

## 2022-02-08 VITALS
DIASTOLIC BLOOD PRESSURE: 84 MMHG | WEIGHT: 315 LBS | OXYGEN SATURATION: 98 % | SYSTOLIC BLOOD PRESSURE: 154 MMHG | BODY MASS INDEX: 50.67 KG/M2 | HEART RATE: 96 BPM

## 2022-02-08 DIAGNOSIS — E11.9 DIABETES MELLITUS WITHOUT COMPLICATION: ICD-10-CM

## 2022-02-08 DIAGNOSIS — E11.9 TYPE 2 DIABETES MELLITUS WITHOUT COMPLICATION, WITHOUT LONG-TERM CURRENT USE OF INSULIN: ICD-10-CM

## 2022-02-08 DIAGNOSIS — E78.2 MIXED HYPERLIPIDEMIA: ICD-10-CM

## 2022-02-08 DIAGNOSIS — E66.01 MORBID OBESITY: ICD-10-CM

## 2022-02-08 PROBLEM — E66.9 OBESITY: Status: ACTIVE | Noted: 2018-10-11

## 2022-02-08 LAB
CHOLEST SERPL-MCNC: 111 MG/DL (ref 120–199)
CHOLEST/HDLC SERPL: 2.2 {RATIO} (ref 2–5)
ESTIMATED AVG GLUCOSE: 146 MG/DL (ref 68–131)
HBA1C MFR BLD: 6.7 % (ref 4–5.6)
HDLC SERPL-MCNC: 50 MG/DL (ref 40–75)
HDLC SERPL: 45 % (ref 20–50)
LDLC SERPL CALC-MCNC: 50 MG/DL (ref 63–159)
NONHDLC SERPL-MCNC: 61 MG/DL
TRIGL SERPL-MCNC: 55 MG/DL (ref 30–150)
TSH SERPL DL<=0.005 MIU/L-ACNC: 0.84 UIU/ML (ref 0.4–4)

## 2022-02-08 PROCEDURE — 36415 COLL VENOUS BLD VENIPUNCTURE: CPT | Performed by: STUDENT IN AN ORGANIZED HEALTH CARE EDUCATION/TRAINING PROGRAM

## 2022-02-08 PROCEDURE — 99204 PR OFFICE/OUTPT VISIT, NEW, LEVL IV, 45-59 MIN: ICD-10-PCS | Mod: S$GLB,,, | Performed by: INTERNAL MEDICINE

## 2022-02-08 PROCEDURE — 99204 OFFICE O/P NEW MOD 45 MIN: CPT | Mod: S$GLB,,, | Performed by: INTERNAL MEDICINE

## 2022-02-08 PROCEDURE — 99999 PR PBB SHADOW E&M-EST. PATIENT-LVL IV: ICD-10-PCS | Mod: PBBFAC,,, | Performed by: STUDENT IN AN ORGANIZED HEALTH CARE EDUCATION/TRAINING PROGRAM

## 2022-02-08 PROCEDURE — 84443 ASSAY THYROID STIM HORMONE: CPT | Performed by: STUDENT IN AN ORGANIZED HEALTH CARE EDUCATION/TRAINING PROGRAM

## 2022-02-08 PROCEDURE — 83036 HEMOGLOBIN GLYCOSYLATED A1C: CPT | Performed by: STUDENT IN AN ORGANIZED HEALTH CARE EDUCATION/TRAINING PROGRAM

## 2022-02-08 PROCEDURE — 80061 LIPID PANEL: CPT | Performed by: STUDENT IN AN ORGANIZED HEALTH CARE EDUCATION/TRAINING PROGRAM

## 2022-02-08 PROCEDURE — 99999 PR PBB SHADOW E&M-EST. PATIENT-LVL IV: CPT | Mod: PBBFAC,,, | Performed by: STUDENT IN AN ORGANIZED HEALTH CARE EDUCATION/TRAINING PROGRAM

## 2022-02-08 RX ORDER — METFORMIN HYDROCHLORIDE 500 MG/1
500 TABLET, EXTENDED RELEASE ORAL DAILY
Qty: 360 TABLET | Refills: 0 | Status: SHIPPED | OUTPATIENT
Start: 2022-02-08 | End: 2022-06-14 | Stop reason: SDUPTHER

## 2022-02-08 RX ORDER — SEMAGLUTIDE 1.34 MG/ML
0.25 INJECTION, SOLUTION SUBCUTANEOUS
Qty: 2 PEN | Refills: 2 | Status: SHIPPED | OUTPATIENT
Start: 2022-02-08 | End: 2022-04-26

## 2022-02-08 NOTE — PATIENT INSTRUCTIONS
"Patient Education       Diabetes and Diet   The Basics   Written by the doctors and editors at [facility]   Why is diet important in diabetes? -- Diet is important because it is part of diabetes treatment. Many people need to change what they eat and how much they eat to help treat their diabetes. It is important for people to treat their diabetes so that they:  · Keep their blood sugar at or near a normal level  · Prevent long-term problems, such as heart or kidney problems, that can happen in people with diabetes  Changing your diet can also help treat obesity, high blood pressure, and high cholesterol. These conditions can affect people with diabetes and can lead to future problems, such as heart attacks or strokes.  Who will work with me to change my diet? -- Your doctor or nurse will work with you to make a food plan to change your diet. They might also recommend that you work with a "dietitian." A dietitian is an expert on food and eating.  Do I need to eat at the same times every day? -- When and how often you should eat depends, in part, on the diabetes medicines you take. For example:  · People who take about the same amount of insulin at the same time each day (called a "fixed regimen") should eat meals at the same times. This is also true for people who take pills that increase insulin levels, such as sulfonylureas. Eating meals at the same time every day helps prevent low blood sugar.  · People who adjust the dose and timing of their insulin each day (called a "flexible regimen") do not always have to eat meals at the same time. That's because they can time their insulin dose for before they plan to eat, and also adjust the dose for how much they plan to eat.  · People who take medicines that don't usually cause low blood sugar, such as metformin, don't have to eat meals at the same time every day.  What do I need to think about when planning what to eat? -- Our bodies break down the food we eat into small " "pieces called carbohydrates, proteins, and fats.  When planning what to eat, people with diabetes need to think about:  · Carbohydrates (or "carbs") - Carbohydrates, which are sugars that our bodies use for energy, can raise a person's blood sugar level. Your doctor, nurse, or dietitian will tell you how many carbohydrates you should eat at each meal or snack. Foods that have carbohydrates include:  ? Bread, pasta, and rice  ? Vegetables and fruits  ? Dairy foods  ? Foods and drinks with added sugar  It is best to get your carbohydrates from fruits, vegetables, whole grains, and low-fat milk. It is best to avoid drinks with added sugar, like soda, juices, and sports drinks.   · Protein - Your doctor, nurse, or dietitian will tell you how much protein you should eat each day. It is best to eat lean meats, fish, eggs, beans, peas, soy products, nuts, and seeds.  · Fats - The type of fat you eat is more important than the amount of fat. "Saturated" and "trans" fats can increase your risk for heart problems, like a heart attack.  ? Foods that have saturated fats include meat, butter, cheese, and ice cream.  ? Foods that have trans fats include processed food with "partially hydrogenated oils" on the ingredient list. This may include fried foods, store bought cookies, muffins, pies, and cakes.  "Monounsaturated" and "polyunsaturated" fats are better for you. Foods with these types of fat include fish, avocado, olive oil, and nuts.  · Calories - People need to eat a certain amount of calories each day to keep their weight the same. People who are overweight and want to lose weight need to eat fewer calories each day.  · Fiber - Eating foods with a lot of fiber can help control a person's blood sugar level. Foods that have a lot of fiber include apples, green beans, peas, beans, lentils, nuts, oatmeal, and whole grains.  · Salt - People who have high blood pressure should not eat foods that contain a lot of salt (also " called sodium). People with high blood pressure should also eat healthy foods, such as fruits, vegetables, and low-fat dairy foods.  · Alcohol - Having more than 1 drink (for women) or 2 drinks (for men) a day can raise blood sugar levels. Also, drinks that have fruit juice or soda in them can raise blood sugar levels.  What can I do if I need to lose weight? -- If you need to lose weight, you can:  · Exercise - Try to get at least 30 minutes of physical activity a day, most days of the week. Even gentle exercise, like walking, is good for your health. Some people with diabetes need to change their medicine dose before they exercise. They might also need to check their blood sugar levels before and after exercising.  · Eat fewer calories - Your doctor, nurse, or dietitian can tell you how many calories you should eat each day in order to lose weight.  If you are worried about your weight, size, or shape, talk with your doctor, nurse, or dietitian. They can help you make changes to improve your health.  Can I eat the same foods as my family? -- Yes. You do not need to eat special foods if you have diabetes. You and your family can eat the same foods. Changing your diet is mostly about eating healthy foods and not eating too much.  What are the other parts of diabetes treatment? -- Besides changing your diet, the other parts of diabetes treatment are:  · Exercise  · Medicines  Some people with diabetes need to learn how to match their diet and exercise with their medicine dose. For example, people who use insulin might need to choose the dose of insulin they give themselves. To choose their dose, they need to think about:  · What they plan to eat at the next meal  · How much exercise they plan to do  · What their blood sugar level is  If the diet and exercise do not match the medicine dose, a person's blood sugar level can get too low or too high. Blood sugar levels that are too low or too high can cause  problems.  All topics are updated as new evidence becomes available and our peer review process is complete.  This topic retrieved from [employer] on: Sep 21, 2021.  Topic 32012 Version 7.0  Release: 29.4.2 - C29.263  © 2021 UpToDate, Inc. and/or its affiliates. All rights reserved.  Consumer Information Use and Disclaimer   This information is not specific medical advice and does not replace information you receive from your health care provider. This is only a brief summary of general information. It does NOT include all information about conditions, illnesses, injuries, tests, procedures, treatments, therapies, discharge instructions or life-style choices that may apply to you. You must talk with your health care provider for complete information about your health and treatment options. This information should not be used to decide whether or not to accept your health care provider's advice, instructions or recommendations. Only your health care provider has the knowledge and training to provide advice that is right for you. The use of this information is governed by the [employer] End User License Agreement, available at https://www.[employer]/en/solutions/Guavus/about/arsenio.The use of [employer] content is governed by the [employer] Terms of Use. ©2021 UpToDate, Inc. All rights reserved.  Copyright   © 2021 UpToDate, Inc. and/or its affiliates. All rights reserved.    Patient Education       Semaglutide (nehemias a GLOO tide)   Brand Names: US Ozempic (0.25 or 0.5 MG/DOSE); Ozempic (1 MG/DOSE); Rybelsus; Wegovy   Brand Names: Lupillo Ozempic (0.25 or 0.5 MG/DOSE); Ozempic (1 MG/DOSE); Rybelsus   Warning   · This drug has been shown to cause thyroid cancer in some animals. It is not known if this happens in humans. If thyroid cancer happens, it may be deadly if not found and treated early. Call your doctor right away if you have a neck mass, trouble breathing, trouble swallowing, or have hoarseness that will not go  away.  · Do not use this drug if you have a health problem called Multiple Endocrine Neoplasia syndrome type 2 (MEN 2), or if you or a family member have had thyroid cancer.    What is this drug used for?   Ozempic prefilled pens and Rybelsus tablets:   · It is used to lower blood sugar in patients with high blood sugar (diabetes).  Ozempic prefilled pens:   · It is used to lower the chance of heart attack, stroke, and death in some people.  Wegovy prefilled pens:   · It is used to help with weight loss in certain people.  What do I need to tell my doctor BEFORE I take this drug?   For all uses of this drug:   · If you are allergic to this drug; any part of this drug; or any other drugs, foods, or substances. Tell your doctor about the allergy and what signs you had.  · If you have ever had pancreatitis.  · If you have or have ever had depression or thoughts of suicide.  · If you are using another drug that has the same drug in it.  · If you are using another drug like this one. If you are not sure, ask your doctor or pharmacist.  If using for high blood sugar:   · If you have type 1 diabetes. Do not use this drug to treat type 1 diabetes.  This is not a list of all drugs or health problems that interact with this drug.  Tell your doctor and pharmacist about all of your drugs (prescription or OTC, natural products, vitamins) and health problems. You must check to make sure that it is safe for you to take this drug with all of your drugs and health problems. Do not start, stop, or change the dose of any drug without checking with your doctor.  What are some things I need to know or do while I take this drug?   For all uses of this drug:   · Tell all of your health care providers that you take this drug. This includes your doctors, nurses, pharmacists, and dentists.  · Follow the diet and workout plan that your doctor told you about.  · Have blood work checked as you have been told by the doctor. Talk with the  doctor.  · Talk with your doctor before you drink alcohol.  · Kidney problems have happened. Sometimes, these may need to be treated in the hospital or with dialysis.  · If you cannot drink liquids by mouth or if you have upset stomach, throwing up, or diarrhea that does not go away; you need to avoid getting dehydrated. Contact your doctor to find out what to do. Dehydration may lead to new or worse kidney problems.  · Do not share pen or cartridge devices with another person even if the needle has been changed. Sharing these devices may pass infections from one person to another. This includes infections you may not know you have.  · If you are planning on getting pregnant, talk with your doctor. You may need to stop taking this drug at least 2 months before getting pregnant.  If using for high blood sugar:   · Wear disease medical alert ID (identification).  · Check your blood sugar as you have been told by your doctor.  · Do not drive if your blood sugar has been low. There is a greater chance of you having a crash.  · It may be harder to control blood sugar during times of stress such as fever, infection, injury, or surgery. A change in physical activity, exercise, or diet may also affect blood sugar.  · Tell your doctor if you are pregnant, plan on getting pregnant, or are breast-feeding. You will need to talk about the benefits and risks to you and the baby.  If using for weight loss:   · If you have high blood sugar (diabetes), you will need to watch your blood sugar closely.  · Weight loss during pregnancy may cause harm to the unborn baby. If you get pregnant while taking this drug or if you want to get pregnant, call your doctor right away.  · Tell your doctor if you are breast-feeding. You will need to talk about any risks to your baby.  What are some side effects that I need to call my doctor about right away?   WARNING/CAUTION: Even though it may be rare, some people may have very bad and sometimes  deadly side effects when taking a drug. Tell your doctor or get medical help right away if you have any of the following signs or symptoms that may be related to a very bad side effect:  For all uses of this drug:   · Signs of an allergic reaction, like rash; hives; itching; red, swollen, blistered, or peeling skin with or without fever; wheezing; tightness in the chest or throat; trouble breathing, swallowing, or talking; unusual hoarseness; or swelling of the mouth, face, lips, tongue, or throat.  · Signs of kidney problems like unable to pass urine, change in how much urine is passed, blood in the urine, or a big weight gain.  · Signs of gallstones like sudden pain in the upper right belly area, right shoulder area, or between the shoulder blades; yellow skin or eyes; or fever with chills.  · Very bad dizziness or passing out.  · A fast heartbeat.  · Change in eyesight.  · Low blood sugar can happen. The chance may be raised when this drug is used with other drugs for diabetes. Signs may be dizziness, headache, feeling sleepy or weak, shaking, fast heartbeat, confusion, hunger, or sweating. Call your doctor right away if you have any of these signs. Follow what you have been told to do for low blood sugar. This may include taking glucose tablets, liquid glucose, or some fruit juices.  · Severe and sometimes deadly pancreas problems (pancreatitis) have happened with this drug. Call your doctor right away if you have severe stomach pain, severe back pain, or severe upset stomach or throwing up.  If using for weight loss:   · New or worse behavior or mood changes like depression or thoughts of suicide.  What are some other side effects of this drug?   All drugs may cause side effects. However, many people have no side effects or only have minor side effects. Call your doctor or get medical help if any of these side effects or any other side effects bother you or do not go away:  If using for high blood sugar:    · Constipation, diarrhea, stomach pain, upset stomach, or throwing up.  Tablets:   · Not hungry.  If using for weight loss:   · Constipation, diarrhea, stomach pain, upset stomach, or throwing up.  · Headache.  · Feeling dizzy, tired, or weak.  · Burping.  · Gas.  These are not all of the side effects that may occur. If you have questions about side effects, call your doctor. Call your doctor for medical advice about side effects.  You may report side effects to your national health agency.  You may report side effects to the FDA at 1-929.144.4687. You may also report side effects at https://www.fda.gov/medwatch.  How is this drug best taken?   Use this drug as ordered by your doctor. Read all information given to you. Follow all instructions closely.  Tablets:   · Take at least 30 minutes before the first food, drink, or drugs of the day.  · Take with plain water only. Do not take with more than 4 ounces (120 mL) of water.  · Swallow whole. Do not chew, break, or crush.  · Keep taking this drug as you have been told by your doctor or other health care provider, even if you feel well.  Prefilled syringes or pen:   · It is given as a shot into the fatty part of the skin on the top of the thigh, belly area, or upper arm.  · If you will be giving yourself the shot, your doctor or nurse will teach you how to give the shot.  · Take with or without food.  · Take the same day each week.  · Move the site where you give the shot with each shot.  · Do not use if the solution is cloudy, leaking, or has particles.  · Do not use if solution changes color.  · Wash your hands before and after use.  · Keep taking this drug as you have been told by your doctor or other health care provider, even if you feel well.  · Throw away needles in a needle/sharp disposal box. Do not reuse needles or other items. When the box is full, follow all local rules for getting rid of it. Talk with a doctor or pharmacist if you have any  questions.  If using for high blood sugar:   · Attach new needle before each dose.  · Take off the needle after each shot. Do not store this device with the needle on it.  · Put the cap back on after you are done using your dose.  · If you are also using insulin, you may inject this drug and the insulin in the same area of the body but not right next to each other.  · Do not mix this drug in the same syringe with insulin.  If using for weight loss:   · Each container is for one use only. Use right after opening. Throw away any part of the opened container after the dose is given.  What do I do if I miss a dose?   Tablets:   · Skip the missed dose and go back to your normal time.  · Do not take 2 doses at the same time or extra doses.  Prefilled syringes or pen:   · Take a missed dose as soon as you think about it and go back to your normal time.  · If it is less than 48 hours until your next dose, skip the missed and go back to your normal time.  · Do not take 2 doses within 48 hours of each other.  · If you miss 2 doses, call your doctor.  How do I store and/or throw out this drug?   Tablets:   · Store in the original container at room temperature.  · Store in a dry place. Do not store in a bathroom.  Prefilled syringes or pen:   · Store unopened pens in a refrigerator. Do not freeze.  · Do not use if it has been frozen.  Ozempic prefilled pens:   · After opening, store in the refrigerator or at room temperature. Throw away any part not used after 56 days.  · Protect from heat and light.  Wegovy prefilled pens:   · You may store unopened containers at room temperature. If you store at room temperature, throw away any part not used after 28 days.  · Store in the original container to protect from light.  All products:   · Keep all drugs in a safe place. Keep all drugs out of the reach of children and pets.  · Throw away unused or  drugs. Do not flush down a toilet or pour down a drain unless you are told to  do so. Check with your pharmacist if you have questions about the best way to throw out drugs. There may be drug take-back programs in your area.  General drug facts   · If your symptoms or health problems do not get better or if they become worse, call your doctor.  · Do not share your drugs with others and do not take anyone else's drugs.  · Some drugs may have another patient information leaflet. If you have any questions about this drug, please talk with your doctor, nurse, pharmacist, or other health care provider.  · This drug comes with an extra patient fact sheet called a Medication Guide. Read it with care. Read it again each time this drug is refilled. If you have any questions about this drug, please talk with the doctor, pharmacist, or other health care provider.  · If you think there has been an overdose, call your poison control center or get medical care right away. Be ready to tell or show what was taken, how much, and when it happened.    Consumer Information Use and Disclaimer   This generalized information is a limited summary of diagnosis, treatment, and/or medication information. It is not meant to be comprehensive and should be used as a tool to help the user understand and/or assess potential diagnostic and treatment options. It does NOT include all information about conditions, treatments, medications, side effects, or risks that may apply to a specific patient. It is not intended to be medical advice or a substitute for the medical advice, diagnosis, or treatment of a health care provider based on the health care provider's examination and assessment of a patient's specific and unique circumstances. Patients must speak with a health care provider for complete information about their health, medical questions, and treatment options, including any risks or benefits regarding use of medications. This information does not endorse any treatments or medications as safe, effective, or approved for  treating a specific patient. UpToDate, Inc. and its affiliates disclaim any warranty or liability relating to this information or the use thereof. The use of this information is governed by the Terms of Use, available at https://www.[employer].com/en/solutions/lexicomp/about/arsenio.  Last Reviewed Date   2021-06-15  Copyright   © 2021 UpToDate, Inc. and its affiliates and/or licensors. All rights reserved.

## 2022-02-08 NOTE — PROGRESS NOTES
I have reviewed and concur with the fellow's history, physical, assessment, and plan.  I have personally interviewed the patient and all questions were answered.     Continue metformin 500 mg XR daily. At weekly semaglutide which will aid with weight loss.

## 2022-02-08 NOTE — ASSESSMENT & PLAN NOTE
-  Will check his HbA1c, urine microalbumin : creatinine, lipid profile and TSH  -  Will profile referrals for podiatry, ophthalmology, diabetes education  -  Reviewed his HbA1c from last year, will continue metformin 500 mg xl (considering mild intolerance on higher doses previously)  -  Will initiate ozempic 0.25 mg weekly (dual benefit of both weight loss and diabetes management). Discussed benefits and side effects, provided reading material to the patient.   -  Advised frequent self blood glucose monitoring. Patient encouraged to document glucose results, provided sugar logs and requested to bring them to every clinic visit.  -  Hypoglycemia precautions discussed with the patient  -  Diet, exercise, lifestyle modifications and A1c Goals discussed

## 2022-02-08 NOTE — ASSESSMENT & PLAN NOTE
- Patient BMI 50.67  - Will initiate Ozempic 0.25 mg weekly and up titrate the dose if he tolerates  - Will provided referrals for bariatric medicine and bariatric surgery  - He has diabetes and has a BMI above 35, meets indication for surgical intervention  - Healthier lifestyle choices, dietary modification and exercised advised  - Advised to continue physical therapy

## 2022-02-08 NOTE — PROGRESS NOTES
Subjective:      Chief Complaint: diabetes mellitus  HPI: Jono Gonzalez is a 67 y.o. male who is here for an initial evaluation for diabetes mellitus and obesity    Now Regarding diabetes:  -Patient was initially diagnosed with Type 2 diabetes mellitus around 5 years   -When questioned about pertinent symptoms, he has polyuria and polydipsia, but denies nocturia, nausea vomiting, abdominal discomfort, numbness/tingling, visual change, weight change   -Last HbA1c in chart from March 2021 is 6.6  -Currently has following diabetic complications: neuropathy, retinopathy or nephropathy    Current diabetic medications include:   -Metformin 500 mg once daily    Blood Sugar Range   Monitors finger sticks inconsistently very infrequently  Roughly it is  around 120 mg/dl in morning    Medication Compliance  -Patient has not missed a dose of medication yet and vouches to compliance    Cardiovascular risk factors:   -advanced age, diabetes mellitus, dyslipidemia, hypertension, male gender, and obesity    Diet  He eats 2 meals every day:  Breakfast:  Coffee, biscuit and banana  Lunch or Dinner: nuggets and fries or make a sandwich    Diabetes History in Family  sisters  mother    Exercise:  Walks  Knee pain causing less exercise    Hypoglycemic Episodes:  no    Episodes of Diabetic Ketoacidosis:   no    Previous Treatment:   no    Screening for Complications:    Nephropathy:   ACEi/ARB: yes taking it    Dyslipidemia:  Statin: yes taking it      Screening or Prevention Patient's value Goal Complete/Controlled?   HgA1C Testing and Control   Lab Results   Component Value Date    HGBA1C 6.6 (H) 03/30/2021      Annually/Less than 8% Yes   Lipid profile : 03/30/2021 Annually No   LDL control Lab Results   Component Value Date    LDLCALC 117.4 03/30/2006    Annually/Less than 100 mg/dl  No   Nephropathy screening No results found for: LABMICR  Lab Results   Component Value Date    PROTEINUA Negative 01/05/2017    Annually No   Blood  pressure BP Readings from Last 1 Encounters:   02/08/22 (!) 154/84    Less than 140/90 No   Dilated retinal exam : 06/10/2021 Annually Yes   Foot exam   : 09/13/2021 Annually Yes       Now Regarding Obesity  -Patient has been gradually gaining weight over the last few decades  -In his 20's used to weigh around 155 pounds  -Primary contributors are his poor dietary habits and lack of exercise  -Patient has never tried any weight loss medications  -Patient denies history of pancreatitis and retinopathy      Review of Systems  A complete 14 point review of systems was conducted and negative except for what is stated above     Reviewed past medical, family, social history and updated as appropriate.    Objective:     Vitals:    02/08/22 0825   BP: (!) 154/84   Pulse: 96     BP Readings from Last 5 Encounters:   02/08/22 (!) 154/84   01/27/22 (!) 150/82   12/02/21 (!) 144/80   10/29/21 (!) 160/80   10/21/21 (!) 140/70       Physical Exam  Constitutional:       Appearance: He is obese.   HENT:      Head: Normocephalic.      Right Ear: External ear normal.      Left Ear: External ear normal.      Nose: Nose normal.      Mouth/Throat:      Mouth: Mucous membranes are moist.   Eyes:      Conjunctiva/sclera: Conjunctivae normal.   Cardiovascular:      Rate and Rhythm: Normal rate.      Pulses: Normal pulses.   Pulmonary:      Effort: Pulmonary effort is normal.   Abdominal:      Palpations: Abdomen is soft.   Musculoskeletal:         General: Normal range of motion.      Cervical back: Normal range of motion.   Skin:     General: Skin is warm.   Neurological:      General: No focal deficit present.      Mental Status: He is oriented to person, place, and time.   Psychiatric:         Mood and Affect: Mood normal.         Wt Readings from Last 10 Encounters:   02/08/22 0825 (!) 146.7 kg (323 lb 8.4 oz)   01/27/22 1340 (!) 145.9 kg (321 lb 10.4 oz)   12/02/21 1541 (!) 144.1 kg (317 lb 11.2 oz)   10/29/21 1305 (!) 140.6 kg (310  lb)   10/21/21 1637 (!) 144.7 kg (319 lb 0.1 oz)   09/13/21 1551 (!) 138.6 kg (305 lb 10.7 oz)   07/21/21 0829 (!) 142.9 kg (315 lb)   07/20/21 1508 (!) 145 kg (319 lb 10.7 oz)   06/02/21 1538 (!) 143.1 kg (315 lb 7.7 oz)   05/24/21 1447 (!) 143.9 kg (317 lb 3.9 oz)       Lab Results   Component Value Date    HGBA1C 6.6 (H) 03/30/2021     Lab Results   Component Value Date    CHOL 177 03/30/2006    HDL 38.0 (L) 03/30/2006    LDLCALC 117.4 03/30/2006    TRIG 108 03/30/2006    CHOLHDL 21.5 03/30/2006     Lab Results   Component Value Date     10/29/2021    K 4.4 10/29/2021     10/29/2021    CO2 27 10/29/2021    GLU 94 10/29/2021    BUN 11 10/29/2021    CREATININE 0.9 10/29/2021    CALCIUM 9.0 10/29/2021    PROT 6.4 09/16/2014    ALBUMIN 3.1 (L) 09/16/2014    BILITOT 0.5 09/16/2014    ALKPHOS 66 09/16/2014    AST 14 09/16/2014    ALT 14 09/16/2014    ANIONGAP 7 (L) 10/29/2021    ESTGFRAFRICA >60 10/29/2021    EGFRNONAA >60 10/29/2021    TSH 2.0 03/30/2006      No results found for: MICALBCREAT    Assessment/Plan:     Type 2 diabetes mellitus without complication, without long-term current use of insulin  -  Will check his HbA1c, urine microalbumin : creatinine, lipid profile and TSH  -  Will profile referrals for podiatry, ophthalmology, diabetes education  -  Reviewed his HbA1c from last year, will continue metformin 500 mg xl (considering mild intolerance on higher doses previously)  -  Will initiate ozempic 0.25 mg weekly (dual benefit of both weight loss and diabetes management). Discussed benefits and side effects, provided reading material to the patient.   -  Advised frequent self blood glucose monitoring. Patient encouraged to document glucose results, provided sugar logs and requested to bring them to every clinic visit.  -  Hypoglycemia precautions discussed with the patient  -  Diet, exercise, lifestyle modifications and A1c Goals discussed    Obesity  - Patient BMI 50.67  - Will initiate Ozempic  0.25 mg weekly and up titrate the dose if he tolerates  - Will provided referrals for bariatric medicine and bariatric surgery  - He has diabetes and has a BMI above 35, meets indication for surgical intervention  - Healthier lifestyle choices, dietary modification and exercised advised  - Advised to continue physical therapy     HLD (hyperlipidemia)  - Advised to continue atorvastatin 40 mg daily  - Will check lipid profile today        Dr. Benny Valdovinos  [facility], [address]th [address]  [address] [place], [address]    Office: [phone]  Fax: [phone]    The above history labs imaging impression and plan were discussed with attending physician who is in agreement and also took part in this patient's care.  I personally reviewed all of the patients available medications, labs, imaging, vitals, allergies, medical history.

## 2022-02-09 ENCOUNTER — TELEPHONE (OUTPATIENT)
Dept: BARIATRICS | Facility: CLINIC | Age: 67
End: 2022-02-09
Payer: COMMERCIAL

## 2022-02-14 ENCOUNTER — CLINICAL SUPPORT (OUTPATIENT)
Dept: DIABETES | Facility: CLINIC | Age: 67
End: 2022-02-14
Payer: COMMERCIAL

## 2022-02-14 ENCOUNTER — OFFICE VISIT (OUTPATIENT)
Dept: PODIATRY | Facility: CLINIC | Age: 67
End: 2022-02-14
Payer: COMMERCIAL

## 2022-02-14 VITALS
HEART RATE: 96 BPM | HEIGHT: 67 IN | WEIGHT: 315 LBS | DIASTOLIC BLOOD PRESSURE: 81 MMHG | BODY MASS INDEX: 49.44 KG/M2 | SYSTOLIC BLOOD PRESSURE: 176 MMHG

## 2022-02-14 VITALS — WEIGHT: 315 LBS | BODY MASS INDEX: 50.41 KG/M2

## 2022-02-14 DIAGNOSIS — E11.9 DIABETES MELLITUS WITHOUT COMPLICATION: ICD-10-CM

## 2022-02-14 DIAGNOSIS — E11.9 TYPE 2 DIABETES MELLITUS WITHOUT COMPLICATION, WITHOUT LONG-TERM CURRENT USE OF INSULIN: ICD-10-CM

## 2022-02-14 DIAGNOSIS — B35.3 TINEA PEDIS OF BOTH FEET: ICD-10-CM

## 2022-02-14 DIAGNOSIS — I89.0 LYMPHEDEMA OF BOTH LOWER EXTREMITIES: Primary | ICD-10-CM

## 2022-02-14 DIAGNOSIS — B35.1 DERMATOPHYTOSIS OF NAIL: ICD-10-CM

## 2022-02-14 PROCEDURE — 1159F PR MEDICATION LIST DOCUMENTED IN MEDICAL RECORD: ICD-10-PCS | Mod: CPTII,S$GLB,, | Performed by: PODIATRIST

## 2022-02-14 PROCEDURE — 1159F MED LIST DOCD IN RCRD: CPT | Mod: CPTII,S$GLB,, | Performed by: PODIATRIST

## 2022-02-14 PROCEDURE — 99999 PR PBB SHADOW E&M-EST. PATIENT-LVL II: ICD-10-PCS | Mod: PBBFAC,,,

## 2022-02-14 PROCEDURE — 99203 PR OFFICE/OUTPT VISIT, NEW, LEVL III, 30-44 MIN: ICD-10-PCS | Mod: S$GLB,,, | Performed by: PODIATRIST

## 2022-02-14 PROCEDURE — 1100F PTFALLS ASSESS-DOCD GE2>/YR: CPT | Mod: CPTII,S$GLB,, | Performed by: PODIATRIST

## 2022-02-14 PROCEDURE — 1160F RVW MEDS BY RX/DR IN RCRD: CPT | Mod: CPTII,S$GLB,, | Performed by: PODIATRIST

## 2022-02-14 PROCEDURE — 1100F PR PT FALLS ASSESS DOC 2+ FALLS/FALL W/INJURY/YR: ICD-10-PCS | Mod: CPTII,S$GLB,, | Performed by: PODIATRIST

## 2022-02-14 PROCEDURE — 3061F NEG MICROALBUMINURIA REV: CPT | Mod: CPTII,S$GLB,, | Performed by: PODIATRIST

## 2022-02-14 PROCEDURE — 3079F DIAST BP 80-89 MM HG: CPT | Mod: CPTII,S$GLB,, | Performed by: PODIATRIST

## 2022-02-14 PROCEDURE — 3044F HG A1C LEVEL LT 7.0%: CPT | Mod: CPTII,S$GLB,, | Performed by: PODIATRIST

## 2022-02-14 PROCEDURE — 3077F PR MOST RECENT SYSTOLIC BLOOD PRESSURE >= 140 MM HG: ICD-10-PCS | Mod: CPTII,S$GLB,, | Performed by: PODIATRIST

## 2022-02-14 PROCEDURE — 3288F PR FALLS RISK ASSESSMENT DOCUMENTED: ICD-10-PCS | Mod: CPTII,S$GLB,, | Performed by: PODIATRIST

## 2022-02-14 PROCEDURE — G0108 PR DIAB MANAGE TRN  PER INDIV: ICD-10-PCS | Mod: S$GLB,,, | Performed by: INTERNAL MEDICINE

## 2022-02-14 PROCEDURE — 3072F LOW RISK FOR RETINOPATHY: CPT | Mod: CPTII,S$GLB,, | Performed by: PODIATRIST

## 2022-02-14 PROCEDURE — 3008F BODY MASS INDEX DOCD: CPT | Mod: CPTII,S$GLB,, | Performed by: PODIATRIST

## 2022-02-14 PROCEDURE — G0108 DIAB MANAGE TRN  PER INDIV: HCPCS | Mod: S$GLB,,, | Performed by: INTERNAL MEDICINE

## 2022-02-14 PROCEDURE — 3044F PR MOST RECENT HEMOGLOBIN A1C LEVEL <7.0%: ICD-10-PCS | Mod: CPTII,S$GLB,, | Performed by: PODIATRIST

## 2022-02-14 PROCEDURE — 99999 PR PBB SHADOW E&M-EST. PATIENT-LVL IV: ICD-10-PCS | Mod: PBBFAC,,, | Performed by: PODIATRIST

## 2022-02-14 PROCEDURE — 3008F PR BODY MASS INDEX (BMI) DOCUMENTED: ICD-10-PCS | Mod: CPTII,S$GLB,, | Performed by: PODIATRIST

## 2022-02-14 PROCEDURE — 1126F AMNT PAIN NOTED NONE PRSNT: CPT | Mod: CPTII,S$GLB,, | Performed by: PODIATRIST

## 2022-02-14 PROCEDURE — 99999 PR PBB SHADOW E&M-EST. PATIENT-LVL IV: CPT | Mod: PBBFAC,,, | Performed by: PODIATRIST

## 2022-02-14 PROCEDURE — 3288F FALL RISK ASSESSMENT DOCD: CPT | Mod: CPTII,S$GLB,, | Performed by: PODIATRIST

## 2022-02-14 PROCEDURE — 3072F PR LOW RISK FOR RETINOPATHY: ICD-10-PCS | Mod: CPTII,S$GLB,, | Performed by: PODIATRIST

## 2022-02-14 PROCEDURE — 99203 OFFICE O/P NEW LOW 30 MIN: CPT | Mod: S$GLB,,, | Performed by: PODIATRIST

## 2022-02-14 PROCEDURE — 3077F SYST BP >= 140 MM HG: CPT | Mod: CPTII,S$GLB,, | Performed by: PODIATRIST

## 2022-02-14 PROCEDURE — 3079F PR MOST RECENT DIASTOLIC BLOOD PRESSURE 80-89 MM HG: ICD-10-PCS | Mod: CPTII,S$GLB,, | Performed by: PODIATRIST

## 2022-02-14 PROCEDURE — 3066F NEPHROPATHY DOC TX: CPT | Mod: CPTII,S$GLB,, | Performed by: PODIATRIST

## 2022-02-14 PROCEDURE — 1126F PR PAIN SEVERITY QUANTIFIED, NO PAIN PRESENT: ICD-10-PCS | Mod: CPTII,S$GLB,, | Performed by: PODIATRIST

## 2022-02-14 PROCEDURE — 1160F PR REVIEW ALL MEDS BY PRESCRIBER/CLIN PHARMACIST DOCUMENTED: ICD-10-PCS | Mod: CPTII,S$GLB,, | Performed by: PODIATRIST

## 2022-02-14 PROCEDURE — 3066F PR DOCUMENTATION OF TREATMENT FOR NEPHROPATHY: ICD-10-PCS | Mod: CPTII,S$GLB,, | Performed by: PODIATRIST

## 2022-02-14 PROCEDURE — 3061F PR NEG MICROALBUMINURIA RESULT DOCUMENTED/REVIEW: ICD-10-PCS | Mod: CPTII,S$GLB,, | Performed by: PODIATRIST

## 2022-02-14 PROCEDURE — 99999 PR PBB SHADOW E&M-EST. PATIENT-LVL II: CPT | Mod: PBBFAC,,,

## 2022-02-14 RX ORDER — CLOTRIMAZOLE AND BETAMETHASONE DIPROPIONATE 10; .64 MG/G; MG/G
CREAM TOPICAL DAILY
Qty: 45 G | Refills: 1 | Status: SHIPPED | OUTPATIENT
Start: 2022-02-14

## 2022-02-14 NOTE — PROGRESS NOTES
"Chief Complaint   Patient presents with    Diabetic Foot Exam     Brando Valdovinos MD 2/8/2022              MEDICAL DECISION MAKING:        ICD-10-CM ICD-9-CM    1. Lymphedema of both lower extremities  I89.0 457.1    2. Type 2 diabetes mellitus without complication, without long-term current use of insulin  E11.9 250.00    3. Tinea pedis of both feet  B35.3 110.4 clotrimazole-betamethasone 1-0.05% (LOTRISONE) cream   4. Dermatophytosis of nail  B35.1 110.1             I counseled the patient on the patient's conditions, their implications and medical management.    Shoe inspection.      Continue good nutrition and blood sugar control to help prevent podiatric complications of diabetes.    Maintain proper foot hygiene.    Continue wearing proper shoe gear, daily foot inspections, never walking without protective shoe gear, never putting sharp instruments to feet.   Stretch out salon pedicure appointments to every 6-8 weeks.     Meds as ordered.                 HPI:   The patient is a 67 y.o.  male  who presents for a diabetic foot exam.     Patient reports no presence of abnormal sensation to the feet .    History of diabetic foot ulcers: none   History of foot surgery: none.     Shoes worn today:  Athletics.   Usually gets a salon pedicure about every 2 weeks.  He said is PCP didn't want him to get salon pedicures.  He is "borderline" diabetic.         The patient is under the Active Care of Jim Linn MD for the qualifying diagnosis of diabetes mellitus.  Patient was last seen on Diabetic Foot Exam (Brando Valdovinos MD 2/8/2022)          Patient Active Problem List   Diagnosis    Hypertension    BPH with obstruction/lower urinary tract symptoms    Chronic venous insufficiency    Gouty arthropathy    HLD (hyperlipidemia)    DAVID (obstructive sleep apnea)    Type 2 diabetes mellitus without complication, without long-term current use of insulin    BMI 45.0-49.9, adult    RBBB    Wheezing "    Narcotic dependence    Obesity    Male hypogonadism    Chronic back pain    Acute pain of left knee    History of lumbar spinal fusion    Lymphedema of both lower extremities    Edema of both lower extremities    Swelling of lower limb           Current Outpatient Medications on File Prior to Visit   Medication Sig Dispense Refill    allopurinoL (ZYLOPRIM) 300 MG tablet Take 1 tablet (300 mg total) by mouth once daily. 90 tablet 3    amLODIPine (NORVASC) 10 MG tablet Take 10 mg by mouth once daily.      anastrozole (ARIMIDEX) 1 mg Tab Take 1 mg by mouth once daily.      atorvastatin (LIPITOR) 40 MG tablet Take 1 tablet (40 mg total) by mouth every evening. For cholesterol 90 tablet 3    finasteride (PROSCAR) 5 mg tablet TK 1 T PO QD  3    furosemide (LASIX) 40 MG tablet Take 40 mg by mouth once daily.      hydrocodone-acetaminophen 5-325mg (NORCO) 5-325 mg per tablet TK 1 T PO  Q 6 TO 8 H  0    ibuprofen (ADVIL,MOTRIN) 600 MG tablet TAKE 1 TABLET(600 MG) BY MOUTH EVERY 6 HOURS WITH FOOD AS NEEDED FOR PAIN OR INFLAMMATION 90 tablet 2    metFORMIN (GLUCOPHAGE-XR) 500 MG ER 24hr tablet Take 1 tablet (500 mg total) by mouth once daily. 360 tablet 0    naproxen (NAPROSYN) 500 MG tablet Take 1 tablet (500 mg total) by mouth every 12 (twelve) hours as needed (pain). Take with food. 10 tablet 0    olmesartan (BENICAR) 40 MG tablet Take 1 tablet (40 mg total) by mouth once daily. Blood pressure 90 tablet 3    pregabalin (LYRICA) 75 MG capsule TAKE 1 CAPSULE(75 MG) BY MOUTH EVERY DAY 90 capsule 1    semaglutide (OZEMPIC) 0.25 mg or 0.5 mg(2 mg/1.5 mL) pen injector Inject 0.25 mg into the skin every 7 days. 2 pen 2    tamsulosin (FLOMAX) 0.4 mg Cp24 TK 1 C PO BID  3     Current Facility-Administered Medications on File Prior to Visit   Medication Dose Route Frequency Provider Last Rate Last Admin    acetaminophen tablet 650 mg  650 mg Oral Once PRN Sue Blackman NP        albuterol inhaler 2  "puff  2 puff Inhalation Q20 Min PRN Sue Blackman NP        diphenhydrAMINE capsule 25 mg  25 mg Oral Once PRN Sue Blackman NP        EPINEPHrine (EPIPEN) 0.3 mg/0.3 mL pen injection 0.3 mg  0.3 mg Intramuscular PRN Sue Blackman NP        ondansetron disintegrating tablet 4 mg  4 mg Oral Once PRN Sue Blackman NP        predniSONE tablet 40 mg  40 mg Oral Once PRN Sue Blackman NP               Review of patient's allergies indicates:  No Known Allergies          ROS:  General ROS: negative  Respiratory ROS: no cough, shortness of breath, or wheezing  Cardiovascular ROS: no chest pain or dyspnea on exertion  Musculoskeletal ROS: negative  Neurological ROS:   negative for - impaired coordination/balance or numbness/tingling  Dermatological ROS: negative      LAST HbA1c:   Lab Results   Component Value Date    HGBA1C 6.7 (H) 02/08/2022           EXAM:   Vitals:    02/14/22 1024   BP: (!) 176/81   Pulse: 96   Weight: (!) 146.5 kg (323 lb)   Height: 5' 7" (1.702 m)       General: alert, no distress, cooperative    Vascular:    Dorsalis Pedis:  present      Posterior Tibial:  present   Capillary refill time:  3 seconds   Temperature of toes warm to touch   Edema: Pitting and Present bilaterally      Neurological:      Sharp touch:  normal   Light touch: normal   Tinels Sign:  Absent   Mulders Click:   Absent   [place]:  Absent deficits to sharp/dull, light touch or vibratory sensation feet, ten points tested.    Absent paresthesias (Abnormal spontaneous sensations in feet)        Dermatological:    Skin: xerotic, on the soles   Hair growth:  decreased   Wounds/Ulcers:  Absent   Bruising:  Absent   Erythema:  Absent   Toenails:   thickness:  thickened;   Brownish in color,  without subungual debris.   Absent paronychia. Dystrophic bilateral hallux. Interdigital maceration.  No vesicles.       Musculoskeletal:    Metatarsophalangeal range of motion:   diminished range of " motion   Subtalar joint range of motion: full range of motion   Ankle joint range of motion:  full range of motion   bilateral pes planus

## 2022-02-15 ENCOUNTER — PATIENT OUTREACH (OUTPATIENT)
Dept: ADMINISTRATIVE | Facility: HOSPITAL | Age: 67
End: 2022-02-15
Payer: COMMERCIAL

## 2022-02-15 NOTE — PROGRESS NOTES
Diabetes Care Specialist Progress Note  Author: Lisa White RN, CDE  Date: 2/14/2022    Program Intake  Reason for Diabetes Program Visit:: Initial Diabetes Assessment  Current diabetes risk level:: low  In the last 12 months, have you:: used emergency room services  Permission to speak with others about care:: no    Lab Results   Component Value Date    HGBA1C 6.7 (H) 02/08/2022       Clinical    Patient Health Rating  Compared to other people your age, how would you rate your health?: Fair    Problem Review  Reviewed Problem List with Patient: yes  Active comorbidities affecting diabetes self-care.: yes  Comorbidities: Hypertension,Cardiovascular Disease (Obesity)  Reviewed health maintenance: yes    Clinical Assessment  Current Diabetes Treatment: Insulin,Injectable (Metformin 500 XR qd; Ozempic 0.25 mg weekly)  Have you ever experienced hypoglycemia (low blood sugar)?: no  Have you ever experienced hyperglycemia (high blood sugar)?: no    Medication Information  How do you obtain your medications?: Patient drives  How many days a week do you miss your medications?: Never  Medication adherence impacting ability to self-manage diabetes?: No         Nutritional Status  Diet: Regular  Meal Plan 24 Hour Recall: Breakfast,Lunch,Dinner,Snack  Meal Plan 24 Hour Recall - Breakfast: skips eats around 10 am coffee, bisquit  Meal Plan 24 Hour Recall - Lunch: (P) 2 pm Frosty from [employer]  Meal Plan 24 Hour Recall - Dinner: (P) beans and rice mashed potatoes, baked chicken, veggies  Meal Plan 24 Hour Recall - Snack: (P) Water, regular soda at dinner, no tea  Change in appetite?: (P) No  Current nutritional status an area of need that is impacting patient's ability to self-manage diabetes?: (P) Yes    Additional Social History    Support  Does anyone support you with your diabetes care?: yes  Who supports you?: spouse  Who takes you to your medical appointments?: self  Does the current support meet the patient's needs?:  Yes  Is Support an area impacting ability to self-manage diabetes?: No    Access to Mass Media & Technology  Does the patient have access to any of the following devices or technologies?: Smart phone  Media or technology needs impacting ability to self-manage diabetes?: No    Cognitive/Behavioral Health  Alert and Oriented: Yes  Difficulty Thinking: No  Requires assistance for routine expression?: No  Cognitive or behavioral barriers impacting ability to self-manage diabetes?: No      Communication  Language preference: English  Hearing Problems: No  Communication needs impacting ability to self-manage diabetes?: No    Health Literacy  Preferred Learning Method: Face to Face,Reading Materials  How often do you need to have someone help you read instructions, pamphlets, or written material from your doctor or pharmacy?: Never  Health literacy needs impacting ability to self-manage diabetes?: No      Diabetes Self-Management Skills Assessment    Diabetes Disease Process/Treatment Options  Patient/caregiver able to state what happens when someone has diabetes.: yes  Patient/caregiver knows what type of diabetes they have.: yes  Diabetes Type : Type II  Patient/caregiver able to identify at least three signs and symptoms of diabetes.: yes  Identified signs and symptoms:: frequent urination,increased thirst  Patient able to identify at least three risk factors for diabetes.: yes  Identified risk factors:: being overweight,family history  Diabetes Disease Process/Treatment Options: Skills Assessment Completed: Yes  Assessment indicates:: Knowledge deficit  Area of need?: Yes    Nutrition/Healthy Eating  Challenges to healthy eating:: snacking between meals and at night,eating out, going to parties,portion control  Method of carbohydrate measurement:: no method  Patient can identify foods that impact blood sugar.: yes  Patient-identified foods:: starches (bread, pasta, rice, cereal),fruit/fruit juice,starchy vegetables  (corn, peas, beans),sweets  Nutrition/Healthy Eating Skills Assessment Completed:: Yes  Assessment indicates:: Instruction Needed  Area of need?: Yes    Physical Activity/Exercise  Patient's daily activity level:: lightly active  Patient formally exercises outside of work.: yes  Exercise Type: walking  Intensity: Low  Frequency: four or more times a week  Patient can identify forms of physical activity.: yes  Stated forms of physical activity:: any movement performed by muscles that uses energy,yardwork  Patient can identify reasons why exercise/physical activity is important in diabetes management.: yes  Identified reasons:: lowers blood glucose, blood pressure, and cholesterol  Physical Activity/Exercise Skills Assessment Completed: : Yes  Assessment indicates:: Adequate understanding  Area of need?: No    Medications  Patient is able to describe current diabetes management routine.: yes  Diabetes management routine:: injectable medications,oral medications  Patient is able to identify current diabetes medications, dosages, and appropriate timing of medications.: yes  Patient understands the purpose of the medications taken for diabetes.: yes  Patient reports problems or concerns with current medication regimen.: no  Medication Skills Assessment Completed:: Yes  Assessment indicates:: Instruction Needed  Area of need?: Yes    Home Blood Glucose Monitoring  Patient states that blood sugar is checked at home daily.: no  Reasons for not monitoring:: other (see comments) (Newly enrolled in Diabetes digital med; has equipment, needs to set up)  Home Blood Glucose Monitoring Skills Assessment Completed: : Yes  Assessment indicates:: Knowledge deficit  Area of need?: Yes    Acute Complications  Patient is able to identify types of acute complications: No  Acute Complications Skills Assessment Completed: : Yes  Assessment indicates:: Knowledge deficit  Area of need?: Yes    Chronic Complications  Patient can identify  major chronic complications of diabetes.: no  Patient can identify ways to prevent or delay diabetes complications.: no  Patient is taking statin?: Yes  Chronic Complications Skills Assessment Completed: : Yes  Assessment indicates:: Knowledge deficit  Area of need?: Yes    Psychosocial/Coping  Patient can identify ways of coping with chronic disease.: yes  Patient-stated ways of coping with chronic disease:: support from loved ones  Psychosocial/Coping Skills Assessment Completed: : Yes  Assessment indicates:: Adequate understanding  Area of need?: No      Diabetes Self Support Plan         Assessment Summary and Plan    Based on today's diabetes care assessment, the following areas of need were identified:      Social 2/14/2022   Support No   Access to [employer]/Tech No   Cognitive/Behavioral Health No   Communication No   Health Literacy No        Clinical 2/14/2022   Medication Adherence No   Nutritional Status Yes        Diabetes Self-Management Skills 2/14/2022   Diabetes Disease Process/Treatment Options Yes, Provided DM management guide and provided instruction regarding signs and symptoms, risk factors of diabetes, increased risk for cardio and cerebral vascular events.  Instructed patient on what is diabetes and the progression of the disease. Discussed significance of current A1c and blood glucose goals.   Nutrition/Healthy Eating Yes, see care planning   Physical Activity/Exercise No   Medication Yes, see care planning: ozempic training   Home Blood Glucose Monitoring Yes, Patient to start with digital medicine.  Advised to reach out should he have difficulties setting up.  Suggested twice daily monitoring at different times.  He was provided with logs sheet to record his glucose. Encouraged to send in data in 2 weeks to Endo provider    Acute Complications Yes - Reviewed blood glucose goals, prevention, detection, signs and symptoms, and treatment of hypoglycemia and hyperglycemia, and when to contact  "the clinic.   Chronic Complications Yes, Patient agrees to have the following diabetes stephanie maintenance screenings/appointments yearly eye exams, foot exams.    Psychosocial/Coping No          Today's interventions were provided through individual discussion, instruction, and written materials were provided.      Patient verbalized understanding of instruction and written materials.  Pt was able to return back demonstration of instructions today. Patient understood key points, needs reinforcement and further instruction.     Diabetes Self-Management Care Plan:    Today's Diabetes Self-Management Care Plan was developed with Jono's input. Jono has agreed to work toward the following goal(s) to improve his/her overall diabetes control.      Care Plan: Diabetes Management   Updates made since 2/14/2022 12:00 AM      Problem: Healthy Eating       Goal: Eat 2-3 meals daily with 45-60g/3-4 servings of Carbohydrate per meal. Limit snacking in between meal to 1 serving (15 grams).    Start Date: 2/14/2022   Expected End Date: 5/19/2022   Priority: High   Barriers: No Barriers Identified   Note:    Reviewed meal planning and general nutritional counseling with regards to diabetes management. Meal habits reviewed.  Patient also seen by bariatrics.  He would like to try to lose some weight without having to have surgery.   We concentrated on learning how to make better food choices and stressed portion control.  He is drinking a regular soft drink with dinner and has poor eating habits, eats "late" breakfast. "Late lunch" around 2 pm which is a [employer] frosty.  We concentrated on portion sizes and carb limits for each meal.  Encouraged increased consumption of non starchy veggies to diet.  Overall planning meals and making better choices.  Patient is motivated to make changes. Wants to start losing weight.   Will accomplish this by limiting carb portions per meal. We set a weight loss goal of 10-15 lbs by our next visit.  " He is highly motivated to make these changes.  Starting Ozempic this week, explained this will also help with appetite control.      Task: Reviewed the sources and role of Carbohydrate, Protein, and Fat and how each nutrient impacts blood sugar. Completed 2/14/2022      Task: Provided visual examples using dry measuring cups, food models, and other familiar objects such as computer mouse, deck or cards, tennis ball etc. to help with visualization of portions. Completed 2/14/2022      Task: Explained how to count carbohydrates using the food label and the use of dry measuring cups for accurate carb counting. Completed 2/14/2022      Task: Discussed strategies for choosing healthier menu options when dining out. Completed 2/14/2022      Task: Recommended replacing beverages containing high sugar content with noncaloric/sugar free options and/or water. Completed 2/14/2022      Task: Review the importance of balancing carbohydrates with each meal using portion control techniques to count servings of carbohydrate and label reading to identify serving size and amount of total carbs per serving. Completed 2/14/2022      Task: Provided Sample plate method and reviewed the use of the plate to estimate amounts of carbohydrate per meal. Completed 2/14/2022      Problem: Medications       Goal: Patient Agrees to take Diabetes Medication(s) Weekly Ozempic as prescribed.    Start Date: 2/14/2022   Expected End Date: 5/19/2022   Priority: Medium   Barriers: No Barriers Identified   Note:    Ozempic START    The patient is here in clinic today to attend an individual appointment for training on once a week Ozempic Pen injections. Patient was given background information on Ozempic and how it works. Covered in detail how to use the Ozempic pen (timing - when to take it, meal planning, storage, and pen replacement). Discussed what to expect: side effects, possible hypoglycemia, and blood sugar control. Reviewed causes of hypoglycemia  including signs, symptoms, and treatment options.    Instructed to dial up to ..- symbol and give air shot before using.  Attach pen needle dial to desired dose and inject.  Leave in place for count of 10 sec then remove pen and dispose of needle in sharp container.     Patient performed successful return demonstration with demo pen.    Will  from pharmacy today and begin.      Task: Reviewed with patient all current diabetes medications and provided basic review of the purpose, dosage, frequency, side effects, and storage of both oral and injectable diabetes medications. Completed 2/14/2022      Task: Instructed patient on how to self-administer Ozempic Completed 2/14/2022      Task: Discussed guidelines for preventing, detecting and treating hypoglycemia and hyperglycemia and reviewed the importance of meal and medication timing with diabetes mediations for prevention of hypoglycemia and maximum drug benefit. Completed 2/14/2022          Follow Up Plan     Follow up for 5/19/22 virtual visit.  Evaluate goals, weight loss and bg logs.     Today's care plan and follow up schedule was discussed with patient.  Jono verbalized understanding of the care plan, goals, and agrees to follow up plan.        The patient was encouraged to communicate with his/her health care provider/physician and care team regarding his/her condition(s) and treatment.  I provided the patient with my contact information today and encouraged to contact me via phone or [facility] Patient Portal as needed.     Length of Visit   Total Time: 60 Minutes

## 2022-03-14 ENCOUNTER — OFFICE VISIT (OUTPATIENT)
Dept: PRIMARY CARE CLINIC | Facility: CLINIC | Age: 67
End: 2022-03-14
Payer: COMMERCIAL

## 2022-03-14 VITALS
WEIGHT: 315 LBS | HEIGHT: 67 IN | SYSTOLIC BLOOD PRESSURE: 144 MMHG | DIASTOLIC BLOOD PRESSURE: 72 MMHG | OXYGEN SATURATION: 95 % | HEART RATE: 85 BPM | BODY MASS INDEX: 49.44 KG/M2

## 2022-03-14 DIAGNOSIS — I10 ESSENTIAL HYPERTENSION: ICD-10-CM

## 2022-03-14 DIAGNOSIS — E11.9 TYPE 2 DIABETES MELLITUS WITHOUT COMPLICATION, WITHOUT LONG-TERM CURRENT USE OF INSULIN: ICD-10-CM

## 2022-03-14 DIAGNOSIS — E66.9 OBESITY, UNSPECIFIED CLASSIFICATION, UNSPECIFIED OBESITY TYPE, UNSPECIFIED WHETHER SERIOUS COMORBIDITY PRESENT: Primary | ICD-10-CM

## 2022-03-14 DIAGNOSIS — R60.0 EDEMA OF BOTH LOWER EXTREMITIES: ICD-10-CM

## 2022-03-14 DIAGNOSIS — I89.0 LYMPHEDEMA OF BOTH LOWER EXTREMITIES: ICD-10-CM

## 2022-03-14 DIAGNOSIS — I87.2 CHRONIC VENOUS INSUFFICIENCY: ICD-10-CM

## 2022-03-14 PROCEDURE — 3288F FALL RISK ASSESSMENT DOCD: CPT | Mod: CPTII,S$GLB,, | Performed by: FAMILY MEDICINE

## 2022-03-14 PROCEDURE — 3008F PR BODY MASS INDEX (BMI) DOCUMENTED: ICD-10-PCS | Mod: CPTII,S$GLB,, | Performed by: FAMILY MEDICINE

## 2022-03-14 PROCEDURE — 99999 PR PBB SHADOW E&M-EST. PATIENT-LVL V: CPT | Mod: PBBFAC,,, | Performed by: FAMILY MEDICINE

## 2022-03-14 PROCEDURE — 1159F MED LIST DOCD IN RCRD: CPT | Mod: CPTII,S$GLB,, | Performed by: FAMILY MEDICINE

## 2022-03-14 PROCEDURE — 3044F PR MOST RECENT HEMOGLOBIN A1C LEVEL <7.0%: ICD-10-PCS | Mod: CPTII,S$GLB,, | Performed by: FAMILY MEDICINE

## 2022-03-14 PROCEDURE — 3078F PR MOST RECENT DIASTOLIC BLOOD PRESSURE < 80 MM HG: ICD-10-PCS | Mod: CPTII,S$GLB,, | Performed by: FAMILY MEDICINE

## 2022-03-14 PROCEDURE — 3061F NEG MICROALBUMINURIA REV: CPT | Mod: CPTII,S$GLB,, | Performed by: FAMILY MEDICINE

## 2022-03-14 PROCEDURE — 3008F BODY MASS INDEX DOCD: CPT | Mod: CPTII,S$GLB,, | Performed by: FAMILY MEDICINE

## 2022-03-14 PROCEDURE — 1126F PR PAIN SEVERITY QUANTIFIED, NO PAIN PRESENT: ICD-10-PCS | Mod: CPTII,S$GLB,, | Performed by: FAMILY MEDICINE

## 2022-03-14 PROCEDURE — 3066F PR DOCUMENTATION OF TREATMENT FOR NEPHROPATHY: ICD-10-PCS | Mod: CPTII,S$GLB,, | Performed by: FAMILY MEDICINE

## 2022-03-14 PROCEDURE — 3044F HG A1C LEVEL LT 7.0%: CPT | Mod: CPTII,S$GLB,, | Performed by: FAMILY MEDICINE

## 2022-03-14 PROCEDURE — 3072F PR LOW RISK FOR RETINOPATHY: ICD-10-PCS | Mod: CPTII,S$GLB,, | Performed by: FAMILY MEDICINE

## 2022-03-14 PROCEDURE — 1160F RVW MEDS BY RX/DR IN RCRD: CPT | Mod: CPTII,S$GLB,, | Performed by: FAMILY MEDICINE

## 2022-03-14 PROCEDURE — 3288F PR FALLS RISK ASSESSMENT DOCUMENTED: ICD-10-PCS | Mod: CPTII,S$GLB,, | Performed by: FAMILY MEDICINE

## 2022-03-14 PROCEDURE — 1159F PR MEDICATION LIST DOCUMENTED IN MEDICAL RECORD: ICD-10-PCS | Mod: CPTII,S$GLB,, | Performed by: FAMILY MEDICINE

## 2022-03-14 PROCEDURE — 1101F PT FALLS ASSESS-DOCD LE1/YR: CPT | Mod: CPTII,S$GLB,, | Performed by: FAMILY MEDICINE

## 2022-03-14 PROCEDURE — 3066F NEPHROPATHY DOC TX: CPT | Mod: CPTII,S$GLB,, | Performed by: FAMILY MEDICINE

## 2022-03-14 PROCEDURE — 3061F PR NEG MICROALBUMINURIA RESULT DOCUMENTED/REVIEW: ICD-10-PCS | Mod: CPTII,S$GLB,, | Performed by: FAMILY MEDICINE

## 2022-03-14 PROCEDURE — 99213 OFFICE O/P EST LOW 20 MIN: CPT | Mod: S$GLB,,, | Performed by: FAMILY MEDICINE

## 2022-03-14 PROCEDURE — 1160F PR REVIEW ALL MEDS BY PRESCRIBER/CLIN PHARMACIST DOCUMENTED: ICD-10-PCS | Mod: CPTII,S$GLB,, | Performed by: FAMILY MEDICINE

## 2022-03-14 PROCEDURE — 1101F PR PT FALLS ASSESS DOC 0-1 FALLS W/OUT INJ PAST YR: ICD-10-PCS | Mod: CPTII,S$GLB,, | Performed by: FAMILY MEDICINE

## 2022-03-14 PROCEDURE — 1126F AMNT PAIN NOTED NONE PRSNT: CPT | Mod: CPTII,S$GLB,, | Performed by: FAMILY MEDICINE

## 2022-03-14 PROCEDURE — 99999 PR PBB SHADOW E&M-EST. PATIENT-LVL V: ICD-10-PCS | Mod: PBBFAC,,, | Performed by: FAMILY MEDICINE

## 2022-03-14 PROCEDURE — 3077F PR MOST RECENT SYSTOLIC BLOOD PRESSURE >= 140 MM HG: ICD-10-PCS | Mod: CPTII,S$GLB,, | Performed by: FAMILY MEDICINE

## 2022-03-14 PROCEDURE — 3077F SYST BP >= 140 MM HG: CPT | Mod: CPTII,S$GLB,, | Performed by: FAMILY MEDICINE

## 2022-03-14 PROCEDURE — 3078F DIAST BP <80 MM HG: CPT | Mod: CPTII,S$GLB,, | Performed by: FAMILY MEDICINE

## 2022-03-14 PROCEDURE — 3072F LOW RISK FOR RETINOPATHY: CPT | Mod: CPTII,S$GLB,, | Performed by: FAMILY MEDICINE

## 2022-03-14 PROCEDURE — 99213 PR OFFICE/OUTPT VISIT, EST, LEVL III, 20-29 MIN: ICD-10-PCS | Mod: S$GLB,,, | Performed by: FAMILY MEDICINE

## 2022-03-14 NOTE — PROGRESS NOTES
Subjective:       Patient ID: Jono Gonzalez is a 67 y.o. male.    Chief Complaint: Follow-up    68 yo male presenting to clinic for HTN follow up.      HTN - Patient is enrolled in digital blood pressure monitoring program.  Patient states his home blood pressures usually run around 130/80, recently have been a little high at 140/80.  Patient states his cardiologist took him off of his amlodipine 10mg due to his leg swelling.  Patient state he is compliant with his medications, last dose was taken this morning.     Lower extremity edema / bilateral insufficiency - Patient has continued edema in bilateral legs, worse in his left leg.  Patient has been wearing his compression stockings everyday from 8am to 7pm.  Patient has been receiving lymphatic massages with compression wrappings at [facility], states this has greatly improved his symptoms.  Patient states he is going to call and make another appointment soon.     Obesity - Patient was prescribed Ozempic injections by diabetic education for weight loss.  Patient has the medication at home, but states he has not started the injections yet.  He has an appointment with bariatric surgery soon, but states he would prefer to try the injections before surgery.          Review of Systems   Constitutional: Negative for chills and fever.   HENT: Negative for ear pain and sore throat.    Eyes: Negative for pain.   Respiratory: Positive for shortness of breath (with exertion ).    Cardiovascular: Positive for chest pain (late at night ).   Gastrointestinal: Negative for abdominal pain, constipation and diarrhea.   Genitourinary: Negative for dysuria and hematuria.   Musculoskeletal: Negative for back pain and neck pain.   Neurological: Negative for dizziness and light-headedness.   Psychiatric/Behavioral: Positive for sleep disturbance.         Objective:      Physical Exam  Constitutional:       Appearance: Normal appearance. He is obese.   HENT:      Head: Normocephalic and  atraumatic.   Eyes:      Extraocular Movements: Extraocular movements intact.   Cardiovascular:      Rate and Rhythm: Normal rate and regular rhythm.      Heart sounds: No murmur heard.    No friction rub. No gallop.      Comments: Bilateral lower extremity edema, left worse than right.  Pulmonary:      Effort: Pulmonary effort is normal. No respiratory distress.      Breath sounds: Normal breath sounds. No wheezing, rhonchi or rales.   Musculoskeletal:      Cervical back: Normal range of motion and neck supple.      Right lower le+ Edema present.      Left lower le+ Edema present.   Skin:     General: Skin is warm and dry.   Neurological:      General: No focal deficit present.      Mental Status: He is alert and oriented to person, place, and time.   Psychiatric:         Mood and Affect: Mood normal.         Behavior: Behavior normal.         Thought Content: Thought content normal.         Judgment: Judgment normal.         Assessment:       Problem List Items Addressed This Visit        Cardiac/Vascular    Chronic venous insufficiency       Endocrine    Type 2 diabetes mellitus without complication, without long-term current use of insulin    Obesity - Primary       Other    Lymphedema of both lower extremities    Edema of both lower extremities      Other Visit Diagnoses     Essential hypertension              Plan:         1. Obesity, unspecified classification, unspecified obesity type, unspecified whether serious comorbidity present  - Ozempic injections, 0.25 mg once weekly.  Patient educated to increase to 0.5mg once weekly after 1 month of injections.   - Patient educated on the overall health benefits associated with weight loss  - Appointment with bariatric surgery to discuss surgical options    2. Type 2 diabetes mellitus without complication, without long-term current use of insulin  - Continue metformin 500mg once daily and ozempic    3. Chronic venous insufficiency /  Lymphedema of both  lower extremities / edema of both lower extremities  - Continue wearing compression stockings.   - Patient educated that weight loss and increased exercise may be beneficial for symptoms.     6. Essential hypertension  - continue olmesartan 40 mg q.d..  Blood pressure still not quite controlled 144/70, resume amlodipine 10 mg as patient has not noticed any difference in lower extremity edema on or off the amlodipine.    Patient verbalized understanding and agreed with the treatment plan    I saw and evaluated the patient and discussed the care with  NIKO Ahuja. I agree with the findings and plan as documented in the note above.      Jim Linn MD

## 2022-03-17 ENCOUNTER — TELEPHONE (OUTPATIENT)
Dept: BARIATRICS | Facility: CLINIC | Age: 67
End: 2022-03-17
Payer: COMMERCIAL

## 2022-03-23 ENCOUNTER — PATIENT MESSAGE (OUTPATIENT)
Dept: OTHER | Facility: OTHER | Age: 67
End: 2022-03-23
Payer: COMMERCIAL

## 2022-04-01 ENCOUNTER — DOCUMENTATION ONLY (OUTPATIENT)
Dept: REHABILITATION | Facility: HOSPITAL | Age: 67
End: 2022-04-01
Payer: COMMERCIAL

## 2022-04-01 DIAGNOSIS — M79.89 SWELLING OF LOWER LIMB: ICD-10-CM

## 2022-04-01 DIAGNOSIS — I89.0 LYMPHEDEMA OF BOTH LOWER EXTREMITIES: Primary | ICD-10-CM

## 2022-04-01 DIAGNOSIS — E11.9 TYPE 2 DIABETES MELLITUS WITHOUT COMPLICATION, WITHOUT LONG-TERM CURRENT USE OF INSULIN: ICD-10-CM

## 2022-04-01 DIAGNOSIS — I87.2 CHRONIC VENOUS INSUFFICIENCY: ICD-10-CM

## 2022-04-01 DIAGNOSIS — R60.0 EDEMA OF BOTH LOWER EXTREMITIES: ICD-10-CM

## 2022-04-01 NOTE — PROGRESS NOTES
[facility]  Discharge Note    Name: Jono Gonzalez  Clinic Number: [id number]    Therapy Diagnosis:   Encounter Diagnoses   Name Primary?    Lymphedema of both lower extremities Yes    Chronic venous insufficiency     Type 2 diabetes mellitus without complication, without long-term current use of insulin     Edema of both lower extremities     Swelling of lower limb      Physician: Ki Ashby MD*     Physician Orders: PT Eval and Treat - lymphedema  Medical Diagnosis from Referral:   Diagnosis   I89.0 (ICD-10-CM) - Lymphedema of both lower extremities      Evaluation Date: 10/4/2021  Authorization Period Expiration: 12/1/22  Plan of Care Expiration: 12/27/21- send as POC update MD visit 1/4/22  Visit # / Visits authorized: 1/20- new auth ( 8 total )     Precautions: Standard, Diabetes and obesity, CVI, HTN    Date of Last visit: 1/11/2022  Total Visits Received: 8    ASSESSMENT      Pt with inconsistent attendance with work and ortho R knee complaints. Pt has [facility] compression and Dr. Ashby placed orders for Velcro Inelastic wraps as well.     Discharge reason: Patient has not attended therapy since 1/11/22    Discharge FOTO Score: na    Goals: goals of compression recommendations - little reductions in girth    PLAN   This patient is discharged from Physical Therapy      Haley Yusuf, PT

## 2022-04-22 ENCOUNTER — LAB VISIT (OUTPATIENT)
Dept: LAB | Facility: HOSPITAL | Age: 67
End: 2022-04-22
Attending: STUDENT IN AN ORGANIZED HEALTH CARE EDUCATION/TRAINING PROGRAM
Payer: COMMERCIAL

## 2022-04-22 DIAGNOSIS — E11.9 TYPE 2 DIABETES MELLITUS WITHOUT COMPLICATION: ICD-10-CM

## 2022-04-22 DIAGNOSIS — E78.2 MIXED HYPERLIPIDEMIA: ICD-10-CM

## 2022-04-22 DIAGNOSIS — Z11.59 NEED FOR HEPATITIS C SCREENING TEST: ICD-10-CM

## 2022-04-22 PROCEDURE — 86803 HEPATITIS C AB TEST: CPT | Performed by: FAMILY MEDICINE

## 2022-04-22 PROCEDURE — 80061 LIPID PANEL: CPT | Performed by: INTERNAL MEDICINE

## 2022-04-22 PROCEDURE — 83036 HEMOGLOBIN GLYCOSYLATED A1C: CPT | Performed by: FAMILY MEDICINE

## 2022-04-22 PROCEDURE — 36415 COLL VENOUS BLD VENIPUNCTURE: CPT | Mod: PN | Performed by: INTERNAL MEDICINE

## 2022-04-23 LAB
CHOLEST SERPL-MCNC: 126 MG/DL (ref 120–199)
CHOLEST/HDLC SERPL: 2.3 {RATIO} (ref 2–5)
ESTIMATED AVG GLUCOSE: 157 MG/DL (ref 68–131)
HBA1C MFR BLD: 7.1 % (ref 4–5.6)
HDLC SERPL-MCNC: 54 MG/DL (ref 40–75)
HDLC SERPL: 42.9 % (ref 20–50)
LDLC SERPL CALC-MCNC: 59.8 MG/DL (ref 63–159)
NONHDLC SERPL-MCNC: 72 MG/DL
TRIGL SERPL-MCNC: 61 MG/DL (ref 30–150)

## 2022-04-27 LAB — HCV AB SERPL QL IA: NEGATIVE

## 2022-04-28 ENCOUNTER — CLINICAL SUPPORT (OUTPATIENT)
Dept: PRIMARY CARE CLINIC | Facility: CLINIC | Age: 67
End: 2022-04-28
Payer: COMMERCIAL

## 2022-04-28 VITALS
OXYGEN SATURATION: 97 % | BODY MASS INDEX: 50.24 KG/M2 | SYSTOLIC BLOOD PRESSURE: 126 MMHG | WEIGHT: 315 LBS | HEART RATE: 84 BPM | DIASTOLIC BLOOD PRESSURE: 82 MMHG

## 2022-04-28 DIAGNOSIS — I10 ESSENTIAL HYPERTENSION: Primary | ICD-10-CM

## 2022-04-28 PROCEDURE — 99499 NO LOS: ICD-10-PCS | Mod: S$GLB,,, | Performed by: FAMILY MEDICINE

## 2022-04-28 PROCEDURE — 99499 UNLISTED E&M SERVICE: CPT | Mod: S$GLB,,, | Performed by: FAMILY MEDICINE

## 2022-04-28 PROCEDURE — 99999 PR PBB SHADOW E&M-EST. PATIENT-LVL II: ICD-10-PCS | Mod: PBBFAC,,,

## 2022-04-28 PROCEDURE — 99999 PR PBB SHADOW E&M-EST. PATIENT-LVL II: CPT | Mod: PBBFAC,,,

## 2022-04-28 NOTE — PROGRESS NOTES
Jono Gonzalez 67 y.o. male is here today for Blood Pressure check.   History of HTN yes.    Review of patient's allergies indicates:  No Known Allergies  Creatinine   Date Value Ref Range Status   10/29/2021 0.9 0.5 - 1.4 mg/dL Final     Sodium   Date Value Ref Range Status   10/29/2021 140 136 - 145 mmol/L Final     Potassium   Date Value Ref Range Status   10/29/2021 4.4 3.5 - 5.1 mmol/L Final     Comment:     Specimen slightly hemolyzed   ]  Patient verifies taking blood pressure medications on a regular basis at the same time of the day.     Current Outpatient Medications:     allopurinoL (ZYLOPRIM) 300 MG tablet, Take 1 tablet (300 mg total) by mouth once daily., Disp: 90 tablet, Rfl: 3    amLODIPine (NORVASC) 10 MG tablet, Take 10 mg by mouth once daily., Disp: , Rfl:     anastrozole (ARIMIDEX) 1 mg Tab, Take 1 mg by mouth once daily., Disp: , Rfl:     atorvastatin (LIPITOR) 40 MG tablet, Take 1 tablet (40 mg total) by mouth every evening. For cholesterol, Disp: 90 tablet, Rfl: 3    clotrimazole-betamethasone 1-0.05% (LOTRISONE) cream, Apply topically once daily. To feet and toenails, Disp: 45 g, Rfl: 1    finasteride (PROSCAR) 5 mg tablet, TK 1 T PO QD, Disp: , Rfl: 3    furosemide (LASIX) 40 MG tablet, Take 40 mg by mouth once daily., Disp: , Rfl:     hydrocodone-acetaminophen 5-325mg (NORCO) 5-325 mg per tablet, TK 1 T PO  Q 6 TO 8 H, Disp: , Rfl: 0    ibuprofen (ADVIL,MOTRIN) 600 MG tablet, TAKE 1 TABLET(600 MG) BY MOUTH EVERY 6 HOURS WITH FOOD AS NEEDED FOR PAIN OR INFLAMMATION, Disp: 90 tablet, Rfl: 2    metFORMIN (GLUCOPHAGE-XR) 500 MG ER 24hr tablet, Take 1 tablet (500 mg total) by mouth once daily., Disp: 360 tablet, Rfl: 0    naproxen (NAPROSYN) 500 MG tablet, Take 1 tablet (500 mg total) by mouth every 12 (twelve) hours as needed (pain). Take with food., Disp: 10 tablet, Rfl: 0    olmesartan (BENICAR) 40 MG tablet, Take 1 tablet (40 mg total) by mouth once daily. Blood pressure, Disp: 90  tablet, Rfl: 3    pregabalin (LYRICA) 75 MG capsule, TAKE 1 CAPSULE(75 MG) BY MOUTH EVERY DAY, Disp: 90 capsule, Rfl: 1    semaglutide (OZEMPIC) 0.25 mg or 0.5 mg(2 mg/1.5 mL) pen injector, Inject 0.5 mg into the skin every 7 days., Disp: 2 pen, Rfl: 3    tamsulosin (FLOMAX) 0.4 mg Cp24, TK 1 C PO BID, Disp: , Rfl: 3  Does patient have record of home blood pressure readings no. .   Last dose of blood pressure medication was taken at 8:00 this am.  Patient is asymptomatic.   Complains of none.    Vitals:    04/28/22 1631   BP: 126/82   BP Location: Right arm   Patient Position: Sitting   BP Method: Large (Manual)   Pulse: 84   SpO2: 97%   Weight: (!) 145.5 kg (320 lb 12.3 oz)         Dr. Linn informed of nurse visit.

## 2022-05-04 ENCOUNTER — PATIENT MESSAGE (OUTPATIENT)
Dept: PRIMARY CARE CLINIC | Facility: CLINIC | Age: 67
End: 2022-05-04
Payer: COMMERCIAL

## 2022-05-05 RX ORDER — FUROSEMIDE 40 MG/1
40 TABLET ORAL DAILY
Qty: 90 TABLET | Refills: 3 | Status: SHIPPED | OUTPATIENT
Start: 2022-05-05 | End: 2022-05-17

## 2022-05-16 ENCOUNTER — PATIENT OUTREACH (OUTPATIENT)
Dept: ADMINISTRATIVE | Facility: OTHER | Age: 67
End: 2022-05-16
Payer: COMMERCIAL

## 2022-05-17 ENCOUNTER — OFFICE VISIT (OUTPATIENT)
Dept: CARDIOLOGY | Facility: CLINIC | Age: 67
End: 2022-05-17
Payer: COMMERCIAL

## 2022-05-17 VITALS
WEIGHT: 315 LBS | SYSTOLIC BLOOD PRESSURE: 130 MMHG | HEIGHT: 67 IN | BODY MASS INDEX: 49.44 KG/M2 | HEART RATE: 88 BPM | DIASTOLIC BLOOD PRESSURE: 76 MMHG

## 2022-05-17 DIAGNOSIS — G47.33 OSA (OBSTRUCTIVE SLEEP APNEA): ICD-10-CM

## 2022-05-17 DIAGNOSIS — I10 PRIMARY HYPERTENSION: ICD-10-CM

## 2022-05-17 DIAGNOSIS — I89.0 LYMPHEDEMA OF BOTH LOWER EXTREMITIES: ICD-10-CM

## 2022-05-17 DIAGNOSIS — E66.01 CLASS 2 SEVERE OBESITY DUE TO EXCESS CALORIES WITH SERIOUS COMORBIDITY IN ADULT, UNSPECIFIED BMI: ICD-10-CM

## 2022-05-17 DIAGNOSIS — E78.2 MIXED HYPERLIPIDEMIA: ICD-10-CM

## 2022-05-17 DIAGNOSIS — R60.0 EDEMA OF BOTH LOWER EXTREMITIES: ICD-10-CM

## 2022-05-17 DIAGNOSIS — L97.929 VENOUS STASIS ULCER OF LEFT LOWER EXTREMITY: Primary | ICD-10-CM

## 2022-05-17 DIAGNOSIS — I83.029 VENOUS STASIS ULCER OF LEFT LOWER EXTREMITY: Primary | ICD-10-CM

## 2022-05-17 DIAGNOSIS — M79.89 SWELLING OF LOWER LIMB: ICD-10-CM

## 2022-05-17 DIAGNOSIS — E66.01 MORBID OBESITY: ICD-10-CM

## 2022-05-17 PROCEDURE — 99215 PR OFFICE/OUTPT VISIT, EST, LEVL V, 40-54 MIN: ICD-10-PCS | Mod: S$GLB,,, | Performed by: INTERNAL MEDICINE

## 2022-05-17 PROCEDURE — 3072F PR LOW RISK FOR RETINOPATHY: ICD-10-PCS | Mod: CPTII,S$GLB,, | Performed by: INTERNAL MEDICINE

## 2022-05-17 PROCEDURE — 1159F PR MEDICATION LIST DOCUMENTED IN MEDICAL RECORD: ICD-10-PCS | Mod: CPTII,S$GLB,, | Performed by: INTERNAL MEDICINE

## 2022-05-17 PROCEDURE — 1125F PR PAIN SEVERITY QUANTIFIED, PAIN PRESENT: ICD-10-PCS | Mod: CPTII,S$GLB,, | Performed by: INTERNAL MEDICINE

## 2022-05-17 PROCEDURE — 1125F AMNT PAIN NOTED PAIN PRSNT: CPT | Mod: CPTII,S$GLB,, | Performed by: INTERNAL MEDICINE

## 2022-05-17 PROCEDURE — 3075F SYST BP GE 130 - 139MM HG: CPT | Mod: CPTII,S$GLB,, | Performed by: INTERNAL MEDICINE

## 2022-05-17 PROCEDURE — 99999 PR PBB SHADOW E&M-EST. PATIENT-LVL V: ICD-10-PCS | Mod: PBBFAC,,, | Performed by: INTERNAL MEDICINE

## 2022-05-17 PROCEDURE — 3051F PR MOST RECENT HEMOGLOBIN A1C LEVEL 7.0 - < 8.0%: ICD-10-PCS | Mod: CPTII,S$GLB,, | Performed by: INTERNAL MEDICINE

## 2022-05-17 PROCEDURE — 3051F HG A1C>EQUAL 7.0%<8.0%: CPT | Mod: CPTII,S$GLB,, | Performed by: INTERNAL MEDICINE

## 2022-05-17 PROCEDURE — 3078F DIAST BP <80 MM HG: CPT | Mod: CPTII,S$GLB,, | Performed by: INTERNAL MEDICINE

## 2022-05-17 PROCEDURE — 99999 PR PBB SHADOW E&M-EST. PATIENT-LVL V: CPT | Mod: PBBFAC,,, | Performed by: INTERNAL MEDICINE

## 2022-05-17 PROCEDURE — 3061F PR NEG MICROALBUMINURIA RESULT DOCUMENTED/REVIEW: ICD-10-PCS | Mod: CPTII,S$GLB,, | Performed by: INTERNAL MEDICINE

## 2022-05-17 PROCEDURE — 3008F BODY MASS INDEX DOCD: CPT | Mod: CPTII,S$GLB,, | Performed by: INTERNAL MEDICINE

## 2022-05-17 PROCEDURE — 3288F FALL RISK ASSESSMENT DOCD: CPT | Mod: CPTII,S$GLB,, | Performed by: INTERNAL MEDICINE

## 2022-05-17 PROCEDURE — 4010F ACE/ARB THERAPY RXD/TAKEN: CPT | Mod: CPTII,S$GLB,, | Performed by: INTERNAL MEDICINE

## 2022-05-17 PROCEDURE — 1101F PR PT FALLS ASSESS DOC 0-1 FALLS W/OUT INJ PAST YR: ICD-10-PCS | Mod: CPTII,S$GLB,, | Performed by: INTERNAL MEDICINE

## 2022-05-17 PROCEDURE — 3072F LOW RISK FOR RETINOPATHY: CPT | Mod: CPTII,S$GLB,, | Performed by: INTERNAL MEDICINE

## 2022-05-17 PROCEDURE — 1159F MED LIST DOCD IN RCRD: CPT | Mod: CPTII,S$GLB,, | Performed by: INTERNAL MEDICINE

## 2022-05-17 PROCEDURE — 3066F PR DOCUMENTATION OF TREATMENT FOR NEPHROPATHY: ICD-10-PCS | Mod: CPTII,S$GLB,, | Performed by: INTERNAL MEDICINE

## 2022-05-17 PROCEDURE — 3066F NEPHROPATHY DOC TX: CPT | Mod: CPTII,S$GLB,, | Performed by: INTERNAL MEDICINE

## 2022-05-17 PROCEDURE — 3075F PR MOST RECENT SYSTOLIC BLOOD PRESS GE 130-139MM HG: ICD-10-PCS | Mod: CPTII,S$GLB,, | Performed by: INTERNAL MEDICINE

## 2022-05-17 PROCEDURE — 1101F PT FALLS ASSESS-DOCD LE1/YR: CPT | Mod: CPTII,S$GLB,, | Performed by: INTERNAL MEDICINE

## 2022-05-17 PROCEDURE — 4010F PR ACE/ARB THEARPY RXD/TAKEN: ICD-10-PCS | Mod: CPTII,S$GLB,, | Performed by: INTERNAL MEDICINE

## 2022-05-17 PROCEDURE — 3078F PR MOST RECENT DIASTOLIC BLOOD PRESSURE < 80 MM HG: ICD-10-PCS | Mod: CPTII,S$GLB,, | Performed by: INTERNAL MEDICINE

## 2022-05-17 PROCEDURE — 3288F PR FALLS RISK ASSESSMENT DOCUMENTED: ICD-10-PCS | Mod: CPTII,S$GLB,, | Performed by: INTERNAL MEDICINE

## 2022-05-17 PROCEDURE — 3061F NEG MICROALBUMINURIA REV: CPT | Mod: CPTII,S$GLB,, | Performed by: INTERNAL MEDICINE

## 2022-05-17 PROCEDURE — 99215 OFFICE O/P EST HI 40 MIN: CPT | Mod: S$GLB,,, | Performed by: INTERNAL MEDICINE

## 2022-05-17 PROCEDURE — 3008F PR BODY MASS INDEX (BMI) DOCUMENTED: ICD-10-PCS | Mod: CPTII,S$GLB,, | Performed by: INTERNAL MEDICINE

## 2022-05-17 RX ORDER — FUROSEMIDE 40 MG/1
40 TABLET ORAL 2 TIMES DAILY
Qty: 90 TABLET | Refills: 3 | Status: SHIPPED | OUTPATIENT
Start: 2022-05-17 | End: 2023-08-08 | Stop reason: SDUPTHER

## 2022-05-17 NOTE — PROGRESS NOTES
Requested updates within Care Everywhere.  Patient's chart was reviewed for overdue FAITH topics.  Health maintenance:updated  Immunizations:reconciled   Legacy:   Media:  Orders placed:  Tasked appts:  Labs Linked:  Upcoming appt:Optometry 7/27/22

## 2022-05-17 NOTE — PROGRESS NOTES
[facility]      Chief Complaint   Patient presents with    Lymphedema    Leaking Wounds on Left Leg       Patient ID: Jono Gonzalez is a 67 y.o. male with chronic venous insufficiency, BLE lymphedema, DM2, HTN, HLD, DAVID (on CPAP), morbid obesity, who presents for a follow up appointment.  Pertinent history/events are as follows:     -Pt kindly referred by Dr. Linn for evaluation of chronic venous insufficiency.    -At our initial clinic visit on 5/24/2021, Mr. Gonzalez reported leg swelling starting 6 years ago.  States swelling is controlled with fluid pills and compression stocking.  No claudication or tissue loss.  Exam shows BLE's with 1+ pitting edema, venous stasis dermatitis, and changes consistent with lymphedema (L>R).   Plan:   Chronic venous insufficiency with Lymphedema- Exam shows BLE's with 1+ pitting edema, venous stasis dermatitis, and changes consistent with lymphedema (L>R).  Check BLE venous reflux study and segmental pressure study.  Refer to lymphedema clinic.  Continue lasix 40 mg daily.  Limit sodium intake to 2,000 mg daily.  Limit volume intake to 1.5 liters daily.  Pt will benefit from significant weight loss.  Morbid Obesity- Encourage diet, exercise and weight loss.      -At follow up clinic visit on 7/20/2021, Mr. Gonzalez reported significant improvement in BLE edema since clinic visit on 5/24/2021.  Plan:   Chronic venous insufficiency with Lymphedema- Improving.  Check BLE venous reflux study and segmental pressure study.  Refer to lymphedema clinic.  Continue lasix 40 mg daily.  Limit sodium intake to 2,000 mg daily.  Limit volume intake to 1.5 liters daily.  Pt will benefit from significant weight loss.  Morbid Obesity- Encourage diet, exercise and weight loss.    10/21/2021 clinic visit: Mr. Gonzalez reported continued improvement in BLE edema since clinic visit on 7/20/2021.  He is now participating in lymphedema clinic.      Plan:  Chronic venous insufficiency with  Lymphedema- Improving.  Continue lymphedema clinic therapy.  Continue lasix 40 mg daily.  Limit sodium intake to 2,000 mg daily.  Limit volume intake to 1.5 liters daily.  Pt will benefit from significant weight loss.  Morbid Obesity- Encourage diet, exercise and weight loss.    HTN- Continue current medications.  HLD- Continue atorvastatin 40 mg daily.  Check updated lipid panel.  DAVID- Continue CPAP nightly.     HPI:  Mr. Gonzalez reports leg swelling with a wound at the left shin starting 2 weeks ago.  He does not remember injuring his left leg.  Exam shows 1+ pitting BLE edema with small ulcer at left shin.      Past Medical History:   Diagnosis Date    Diabetes mellitus     Gout attack     Hypertension      Past Surgical History:   Procedure Laterality Date    BACK SURGERY      lumbar fusion      X 2     Social History     Socioeconomic History    Marital status:    Tobacco Use    Smoking status: Never Smoker    Smokeless tobacco: Never Used   Substance and Sexual Activity    Alcohol use: Yes     Comment: occ    Drug use: No     Family History   Problem Relation Age of Onset    Diabetes Mother     Heart disease Mother     Thyroid disease Mother     Diabetes Sister     Heart disease Father     Diabetes Father     Heart disease Brother     Heart disease Sister     Diabetes Sister     Stroke Sister        Review of patient's allergies indicates:  No Known Allergies    Medication List with Changes/Refills   Current Medications    ALLOPURINOL (ZYLOPRIM) 300 MG TABLET    Take 1 tablet (300 mg total) by mouth once daily.    AMLODIPINE (NORVASC) 10 MG TABLET    Take 10 mg by mouth once daily.    ANASTROZOLE (ARIMIDEX) 1 MG TAB    Take 1 mg by mouth once daily.    ATORVASTATIN (LIPITOR) 40 MG TABLET    Take 1 tablet (40 mg total) by mouth every evening. For cholesterol    CLOTRIMAZOLE-BETAMETHASONE 1-0.05% (LOTRISONE) CREAM    Apply topically once daily. To feet and toenails    FINASTERIDE  "(PROSCAR) 5 MG TABLET    TK 1 T PO QD    FUROSEMIDE (LASIX) 40 MG TABLET    Take 1 tablet (40 mg total) by mouth once daily.    HYDROCODONE-ACETAMINOPHEN 5-325MG (NORCO) 5-325 MG PER TABLET    TK 1 T PO  Q 6 TO 8 H    IBUPROFEN (ADVIL,MOTRIN) 600 MG TABLET    TAKE 1 TABLET(600 MG) BY MOUTH EVERY 6 HOURS WITH FOOD AS NEEDED FOR PAIN OR INFLAMMATION    METFORMIN (GLUCOPHAGE-XR) 500 MG ER 24HR TABLET    Take 1 tablet (500 mg total) by mouth once daily.    NAPROXEN (NAPROSYN) 500 MG TABLET    Take 1 tablet (500 mg total) by mouth every 12 (twelve) hours as needed (pain). Take with food.    OLMESARTAN (BENICAR) 40 MG TABLET    Take 1 tablet (40 mg total) by mouth once daily. Blood pressure    PREGABALIN (LYRICA) 75 MG CAPSULE    TAKE 1 CAPSULE(75 MG) BY MOUTH EVERY DAY    SEMAGLUTIDE (OZEMPIC) 0.25 MG OR 0.5 MG(2 MG/1.5 ML) PEN INJECTOR    Inject 0.5 mg into the skin every 7 days.    TAMSULOSIN (FLOMAX) 0.4 MG CP24    TK 1 C PO BID       Review of Systems  Constitution: Denies chills, fever, and sweats.  HENT: Denies headaches or blurry vision.  Cardiovascular: Denies chest pain or irregular heart beat.  Respiratory: Denies cough or shortness of breath.  Gastrointestinal: Denies abdominal pain, nausea, or vomiting.  Musculoskeletal: Positive for leg swelling.  Neurological: Denies dizziness or focal weakness.  Psychiatric/Behavioral: Normal mental status.  Hematologic/Lymphatic: Denies bleeding problem or easy bruising/bleeding.  Skin: Denies rash or suspicious lesions    Physical Examination  /76   Pulse 88   Ht 5' 7" (1.702 m)   Wt (!) 145.9 kg (321 lb 10.4 oz)   BMI 50.38 kg/m²     Constitutional: No acute distress, conversant  HEENT: Sclera anicteric, Pupils equal, round and reactive to light, extraocular motions intact, Oropharynx clear  Neck: No JVD, no carotid bruits  Cardiovascular: regular rate and rhythm, no murmur, rubs or gallops, normal S1/S2  Pulmonary: Clear to auscultation " bilaterally  Abdominal: Abdomen soft, nontender, nondistended, positive bowel sounds  Extremities: BLE's with 1+ pitting edema, venous stasis dermatitis, and changes consistent with lymphedema (L>R).    Pulses:  Carotid pulses are 2+ on the right side, and 2+ on the left side.  Radial pulses are 2+ on the right side, and 2+ on the left side.   Femoral pulses are 2+ on the right side, and 2+ on the left side.  Popliteal pulses are 2+ on the right side, and 2+ on the left side.   Dorsalis pedis pulses are 2+ on the right side, and 2+ on the left side.   Posterior tibial pulses are 2+ on the right side, and 2+ on the left side.    Skin: No ecchymosis, erythema, or ulcers  Psych: Alert and oriented x 3, appropriate affect  Neuro: CNII-XII intact, no focal deficits    Labs:  Most Recent Data  CBC:   Lab Results   Component Value Date    WBC 8.93 10/29/2021    HGB 13.0 (L) 10/29/2021    HCT 41.0 10/29/2021     10/29/2021    MCV 86 10/29/2021    RDW 14.8 (H) 10/29/2021     BMP:   Lab Results   Component Value Date     10/29/2021    K 4.4 10/29/2021     10/29/2021    CO2 27 10/29/2021    BUN 11 10/29/2021    CREATININE 0.9 10/29/2021    GLU 94 10/29/2021    CALCIUM 9.0 10/29/2021    MG 2.3 03/30/2006     LFTS;   Lab Results   Component Value Date    PROT 6.4 09/16/2014    ALBUMIN 3.1 (L) 09/16/2014    BILITOT 0.5 09/16/2014    AST 14 09/16/2014    ALKPHOS 66 09/16/2014    ALT 14 09/16/2014     COAGS:   Lab Results   Component Value Date    INR 1.0 03/29/2006     FLP:   Lab Results   Component Value Date    CHOL 126 04/22/2022    HDL 54 04/22/2022    LDLCALC 59.8 (L) 04/22/2022    TRIG 61 04/22/2022    CHOLHDL 42.9 04/22/2022     CARDIAC: No results found for: TROPONINI, CKMB, BNP    PET Stress Test 5/12/2021:    The relative PET images are normal showing no clinically significant regional resting or stress induced perfusion defects.    The whole heart absolute myocardial perfusion values averaged 1.37  cc/min/g at rest, which is elevated; 1.39 cc/min/g at stress, which is mildly reduced; and CFR is 1.02 , which is severely reduced in part due to elevated resting flow.    The gated perfusion images showed an ejection fraction of 75% at rest and 78% during stress. A normal ejection fraction is greater than 51%.    The wall motion is normal at rest and during stress.    The LV cavity size is normal at rest and stress.    The EKG portion of this study is negative for ischemia.    There were no arrhythmias during stress.     The patient reported no chest pain during the stress test.    There are no prior studies for comparison.    Assessment/Plan:  Jono Gonzalez is a 67 y.o. male with chronic venous insufficiency, BLE venous insufficiency, BLE venous insufficiency, DM2, HTN, HLD, DAVID (on CPAP), morbid obesity, who presents for a follow up appointment    1. Chronic venous insufficiency with Lymphedema- Pt with worsening edema and ulcer at left shin.  Increase lasix to 40 mg bid.  Check cmp in 1 week.  Refer to wound care.  Limit sodium intake to 2,000 mg daily.  Limit volume intake to 1.5 liters daily.  Pt will benefit from significant weight loss.    2. Morbid Obesity- Encourage diet, exercise and weight loss. Although A1c at goal for age, he may benefit from GLP-1 agonist for weight loss. Refer to endocrinology. Refer to bariatric surgery/medicine.    3. HTN- Continue current medications.    4. HLD- Continue atorvastatin 40 mg daily.       5. DAVID- Continue CPAP nightly.      Follow up in 6 weeks    Total duration of face to face visit time 30 minutes.  Total time spent counseling greater than fifty percent of total visit time.  Counseling included discussion regarding imaging findings, diagnosis, possibilities, treatment options, risks and benefits.  The patient had many questions regarding the options and long-term effects.    Ki Ashby MD, PhD  Interventional Cardiology

## 2022-05-17 NOTE — PATIENT INSTRUCTIONS
Assessment/Plan:  Jono Gonzalez is a 67 y.o. male with chronic venous insufficiency, BLE venous insufficiency, BLE venous insufficiency, DM2, HTN, HLD, DAVID (on CPAP), morbid obesity, who presents for a follow up appointment    1. Chronic venous insufficiency with Lymphedema- Pt with worsening edema and ulcer at left shin.  Increase lasix to 40 mg bid.  Check cmp in 1 week.  Refer to wound care.  Limit sodium intake to 2,000 mg daily.  Limit volume intake to 1.5 liters daily.  Pt will benefit from significant weight loss.    2. Morbid Obesity- Encourage diet, exercise and weight loss. Although A1c at goal for age, he may benefit from GLP-1 agonist for weight loss. Refer to endocrinology. Refer to bariatric surgery/medicine.    3. HTN- Continue current medications.    4. HLD- Continue atorvastatin 40 mg daily.       5. DAVID- Continue CPAP nightly.      Follow up in 6 weeks

## 2022-05-18 ENCOUNTER — TELEPHONE (OUTPATIENT)
Dept: WOUND CARE | Facility: CLINIC | Age: 67
End: 2022-05-18
Payer: COMMERCIAL

## 2022-05-18 NOTE — TELEPHONE ENCOUNTER
----- Message from Trisha Ewing MA sent at 5/17/2022  5:20 PM CDT -----  Regarding: Referral  Good afternoon staff!    Dr. Ashby put in a referral for this patient to see someone in your clinic. Can someone call and assist the patient with scheduling?    Thanks,  Trisha

## 2022-05-19 ENCOUNTER — CLINICAL SUPPORT (OUTPATIENT)
Dept: DIABETES | Facility: CLINIC | Age: 67
End: 2022-05-19
Payer: COMMERCIAL

## 2022-05-19 DIAGNOSIS — E11.9 TYPE 2 DIABETES MELLITUS WITHOUT COMPLICATION, WITHOUT LONG-TERM CURRENT USE OF INSULIN: ICD-10-CM

## 2022-05-19 PROCEDURE — G0108 PR DIAB MANAGE TRN  PER INDIV: ICD-10-PCS | Mod: 95,,,

## 2022-05-19 PROCEDURE — G0108 DIAB MANAGE TRN  PER INDIV: HCPCS | Mod: 95,,,

## 2022-05-20 NOTE — PROGRESS NOTES
Diabetes Care Specialist Virtual Visit Note   It was in the patient's best interest to receive diabetes self-management education and support services in this format to prevent the exposure to COVID-19.        The patient location is: home in [place]  The chief complaint leading to consultation is: Diabetes  Visit type: audiovisual  Total time spent with patient: 30 min   Each patient to whom he or she provides medical services by telemedicine is:  (1) informed of the relationship between the physician and patient and the respective role of any other health care provider with respect to management of the patient; and (2) notified that he or she may decline to receive medical services by telemedicine and may withdraw from such care at any time.    Diabetes Care Specialist Progress Note  Author: Lisa White RN, CDE  Date: 5/19/2022    Program Intake  Reason for Diabetes Program Visit:: Intervention  Type of Intervention:: Individual  Current diabetes risk level:: moderate  In the last 12 months, have you:: used emergency room services  Was the ER or hospital admission related to diabetes?: No  Permission to speak with others about care:: yes (wife with patient during visit)    Lab Results   Component Value Date    HGBA1C 7.1 (H) 04/22/2022       Clinical    Problem Review  Reviewed Problem List with Patient: yes  Active comorbidities affecting diabetes self-care.: yes  Comorbidities: Hypertension, Cardiovascular Disease (Obesity)  Reviewed health maintenance: yes    Clinical Assessment  Have you ever experienced hypoglycemia (low blood sugar)?: no  Have you ever experienced hyperglycemia (high blood sugar)?: no      Nutritional Status  Meal Plan 24 Hour Recall: Breakfast, Lunch, Dinner, Snack  Meal Plan 24 Hour Recall - Breakfast: skips eats around 10 am coffee, bisquit  Meal Plan 24 Hour Recall - Lunch: 2 pm Frosty from [employer]  Meal Plan 24 Hour Recall - Dinner: beans and rice mashed potatoes, baked chicken,  alfredo  Meal Plan 24 Hour Recall - Snack: Water, regular soda at dinner, no tea    Additional Social History    Support  Does anyone support you with your diabetes care?: yes    Access to Mass Media & Technology  Does the patient have access to any of the following devices or technologies?: Smart phone    Health Literacy  Preferred Learning Method: Face to Face, Reading Materials      Diabetes Self-Management Skills Assessment    Diabetes Disease Process/Treatment Options  Patient/caregiver able to state what happens when someone has diabetes.: yes  Patient/caregiver knows what type of diabetes they have.: yes  Diabetes Type : Type II  Patient/caregiver able to identify at least three signs and symptoms of diabetes.: yes  Identified signs and symptoms:: frequent urination, increased thirst  Patient able to identify at least three risk factors for diabetes.: yes  Identified risk factors:: being overweight, family history  Assessment indicates:: Knowledge deficit  Area of need?: Yes    Nutrition/Healthy Eating  Challenges to healthy eating:: snacking between meals and at night, eating out, going to parties, portion control  Method of carbohydrate measurement:: no method  Patient can identify foods that impact blood sugar.: yes  Patient-identified foods:: starches (bread, pasta, rice, cereal), fruit/fruit juice, starchy vegetables (corn, peas, beans), sweets  Assessment indicates:: Instruction Needed  Area of need?: Yes    Physical Activity/Exercise  Patient's daily activity level:: lightly active  Patient formally exercises outside of work.: yes  Exercise Type: walking  Intensity: Low  Frequency: four or more times a week  Patient can identify forms of physical activity.: yes  Stated forms of physical activity:: any movement performed by muscles that uses energy, yardwork  Patient can identify reasons why exercise/physical activity is important in diabetes management.: yes  Identified reasons:: lowers blood glucose,  blood pressure, and cholesterol  Assessment indicates:: Adequate understanding  Area of need?: No    Medications  Patient is able to describe current diabetes management routine.: yes  Diabetes management routine:: injectable medications, oral medications  Patient is able to identify current diabetes medications, dosages, and appropriate timing of medications.: yes  Patient understands the purpose of the medications taken for diabetes.: yes  Patient reports problems or concerns with current medication regimen.: no  Assessment indicates:: Instruction Needed  Area of need?: Yes    Home Blood Glucose Monitoring  Patient states that blood sugar is checked at home daily.: no  Reasons for not monitoring:: other (see comments) (Newly enrolled in Diabetes digital med; has equipment, needs to set up)  Assessment indicates:: Knowledge deficit  Area of need?: Yes    Acute Complications  Assessment indicates:: Knowledge deficit  Area of need?: Yes    Chronic Complications  Patient can identify major chronic complications of diabetes.: no  Patient can identify ways to prevent or delay diabetes complications.: no  Patient is taking statin?: Yes  Assessment indicates:: Knowledge deficit  Area of need?: Yes    Psychosocial/Coping  Patient can identify ways of coping with chronic disease.: yes  Patient-stated ways of coping with chronic disease:: support from loved ones  Assessment indicates:: Adequate understanding  Area of need?: No      Diabetes Self Support Plan         Assessment Summary and Plan    Based on today's diabetes care assessment, the following areas of need were identified:      Social 2/14/2022   Support No   Access to Mass Media/Tech No   Cognitive/Behavioral Health No   Communication No   Health Literacy No        Clinical 2/14/2022   Medication Adherence No   Nutritional Status Yes        Diabetes Self-Management Skills 5/19/2022   Diabetes Disease Process/Treatment Options Yes, touched on glucose goals  "  Nutrition/Healthy Eating Yes, see care planning   Physical Activity/Exercise No   Medication Yes, see care planning   Home Blood Glucose Monitoring Yes,  He is enrolled in diabetes digital medicine but has not linked up yet.  Advised to complete this asap.   Acute Complications Yes, reviewd   Chronic Complications Yes, reviewed   Psychosocial/Coping No          Today's interventions were provided through individual discussion, instruction, and written materials were provided.      Patient verbalized understanding of instruction and written materials.  Pt was able to return back demonstration of instructions today. Patient understood key points, needs reinforcement and further instruction.     Diabetes Self-Management Care Plan:    Today's Diabetes Self-Management Care Plan was developed with Jono's input. Jono has agreed to work toward the following goal(s) to improve his/her overall diabetes control.      Care Plan: Diabetes Management   Updates made since 4/20/2022 12:00 AM      Problem: Healthy Eating       Goal: Eat 2-3 meals daily with 45-60g/3-4 servings of Carbohydrate per meal. Limit snacking in between meal to 1 serving (15 grams).    Start Date: 2/14/2022   Expected End Date: 8/18/2022   This Visit's Progress: Not met   Priority: High   Barriers: No Barriers Identified   Note:    Reviewed meal planning and general nutritional counseling with regards to diabetes management. Meal habits reviewed.  Patient also seen by bariatrics.  He would like to try to lose some weight without having to have surgery.   We concentrated on learning how to make better food choices and stressed portion control.  He is drinking a regular soft drink with dinner and has poor eating habits, eats "late" breakfast. "Late lunch" around 2 pm which is a [employer] frosty.  We concentrated on portion sizes and carb limits for each meal.  Encouraged increased consumption of non starchy veggies to diet.  Overall planning meals and " making better choices.  Patient is motivated to make changes. Wants to start losing weight.   Will accomplish this by limiting carb portions per meal. We set a weight loss goal of 10-15 lbs by our next visit.  He is highly motivated to make these changes.  Starting Ozempic this week, explained this will also help with appetite control.     Update to care planning 5/19/2022: Patient was seen for 3 mth f/u were we reviewed goal.  Ptient A1c had increased from 6.7 in 2/22 to 7.1% on 4/22/22. We discussed reasons for this.   has not been totally following diet as prescribed.  Is in the process of renovating his home and has been eating out frequently.  is eating smaller portion, but glucoses are increasing.  Reviewed ways to make better choices when eating out. Stress the importance of making better food choices.  Discussed the impact food has on our glucose control.  will try to improve his food selections.  He is followed by bariatrics as well.      Problem: Medications       Goal: Patient Agrees to take Diabetes Medication(s) Weekly Ozempic as prescribed.    Start Date: 2/14/2022   Expected End Date: 8/18/2022   This Visit's Progress: On track   Priority: Medium   Barriers: No Barriers Identified   Note:    Ozempic START    The patient is here in clinic today to attend an individual appointment for training on once a week Ozempic Pen injections. Patient was given background information on Ozempic and how it works. Covered in detail how to use the Ozempic pen (timing - when to take it, meal planning, storage, and pen replacement). Discussed what to expect: side effects, possible hypoglycemia, and blood sugar control. Reviewed causes of hypoglycemia including signs, symptoms, and treatment options.    Instructed to dial up to ..- symbol and give air shot before using.  Attach pen needle dial to desired dose and inject.  Leave in place for count of 10 sec then remove pen and dispose of needle in sharp  container.     Patient performed successful return demonstration with demo pen.    Will  from pharmacy today and begin.     Update to care planning 5/19/2022: continues with all meds         Follow Up Plan     Follow up scheduled for 8/18/2022 to review goals.     Today's care plan and follow up schedule was discussed with patient.  Jono verbalized understanding of the care plan, goals, and agrees to follow up plan.        The patient was encouraged to communicate with his/her health care provider/physician and care team regarding his/her condition(s) and treatment.  I provided the patient with my contact information today and encouraged to contact me via phone or [facility] as needed.     Length of Visit   Total Time: 30 Minutes

## 2022-05-26 ENCOUNTER — OFFICE VISIT (OUTPATIENT)
Dept: WOUND CARE | Facility: CLINIC | Age: 67
End: 2022-05-26
Payer: COMMERCIAL

## 2022-05-26 VITALS
WEIGHT: 315 LBS | SYSTOLIC BLOOD PRESSURE: 174 MMHG | HEART RATE: 94 BPM | DIASTOLIC BLOOD PRESSURE: 89 MMHG | BODY MASS INDEX: 50.34 KG/M2 | TEMPERATURE: 98 F

## 2022-05-26 DIAGNOSIS — E11.9 TYPE 2 DIABETES MELLITUS WITHOUT COMPLICATION, WITHOUT LONG-TERM CURRENT USE OF INSULIN: ICD-10-CM

## 2022-05-26 DIAGNOSIS — I10 PRIMARY HYPERTENSION: ICD-10-CM

## 2022-05-26 DIAGNOSIS — I89.0 LYMPHEDEMA OF BOTH LOWER EXTREMITIES: ICD-10-CM

## 2022-05-26 DIAGNOSIS — L03.116 CELLULITIS OF LEFT LEG: Primary | ICD-10-CM

## 2022-05-26 DIAGNOSIS — I87.2 CHRONIC VENOUS INSUFFICIENCY: ICD-10-CM

## 2022-05-26 DIAGNOSIS — I83.029 VENOUS STASIS ULCER OF LEFT LOWER EXTREMITY: ICD-10-CM

## 2022-05-26 DIAGNOSIS — L97.929 VENOUS STASIS ULCER OF LEFT LOWER EXTREMITY: ICD-10-CM

## 2022-05-26 PROCEDURE — 3072F PR LOW RISK FOR RETINOPATHY: ICD-10-PCS | Mod: CPTII,S$GLB,,

## 2022-05-26 PROCEDURE — 1125F PR PAIN SEVERITY QUANTIFIED, PAIN PRESENT: ICD-10-PCS | Mod: CPTII,S$GLB,,

## 2022-05-26 PROCEDURE — 3288F PR FALLS RISK ASSESSMENT DOCUMENTED: ICD-10-PCS | Mod: CPTII,S$GLB,,

## 2022-05-26 PROCEDURE — 4010F PR ACE/ARB THEARPY RXD/TAKEN: ICD-10-PCS | Mod: CPTII,S$GLB,,

## 2022-05-26 PROCEDURE — 1160F RVW MEDS BY RX/DR IN RCRD: CPT | Mod: CPTII,S$GLB,,

## 2022-05-26 PROCEDURE — 3051F PR MOST RECENT HEMOGLOBIN A1C LEVEL 7.0 - < 8.0%: ICD-10-PCS | Mod: CPTII,S$GLB,,

## 2022-05-26 PROCEDURE — 99999 PR PBB SHADOW E&M-EST. PATIENT-LVL IV: ICD-10-PCS | Mod: PBBFAC,,,

## 2022-05-26 PROCEDURE — 3008F PR BODY MASS INDEX (BMI) DOCUMENTED: ICD-10-PCS | Mod: CPTII,S$GLB,,

## 2022-05-26 PROCEDURE — 1159F PR MEDICATION LIST DOCUMENTED IN MEDICAL RECORD: ICD-10-PCS | Mod: CPTII,S$GLB,,

## 2022-05-26 PROCEDURE — 4010F ACE/ARB THERAPY RXD/TAKEN: CPT | Mod: CPTII,S$GLB,,

## 2022-05-26 PROCEDURE — 3077F SYST BP >= 140 MM HG: CPT | Mod: CPTII,S$GLB,,

## 2022-05-26 PROCEDURE — 3066F NEPHROPATHY DOC TX: CPT | Mod: CPTII,S$GLB,,

## 2022-05-26 PROCEDURE — 99203 OFFICE O/P NEW LOW 30 MIN: CPT | Mod: 25,S$GLB,,

## 2022-05-26 PROCEDURE — 1159F MED LIST DOCD IN RCRD: CPT | Mod: CPTII,S$GLB,,

## 2022-05-26 PROCEDURE — 1101F PT FALLS ASSESS-DOCD LE1/YR: CPT | Mod: CPTII,S$GLB,,

## 2022-05-26 PROCEDURE — 3072F LOW RISK FOR RETINOPATHY: CPT | Mod: CPTII,S$GLB,,

## 2022-05-26 PROCEDURE — 3079F DIAST BP 80-89 MM HG: CPT | Mod: CPTII,S$GLB,,

## 2022-05-26 PROCEDURE — 3066F PR DOCUMENTATION OF TREATMENT FOR NEPHROPATHY: ICD-10-PCS | Mod: CPTII,S$GLB,,

## 2022-05-26 PROCEDURE — 3077F PR MOST RECENT SYSTOLIC BLOOD PRESSURE >= 140 MM HG: ICD-10-PCS | Mod: CPTII,S$GLB,,

## 2022-05-26 PROCEDURE — 1101F PR PT FALLS ASSESS DOC 0-1 FALLS W/OUT INJ PAST YR: ICD-10-PCS | Mod: CPTII,S$GLB,,

## 2022-05-26 PROCEDURE — 29581 PR APPL MLT-LAYER VENOUS WOUND COMPRESS BELOW KNEE: ICD-10-PCS | Mod: LT,S$GLB,,

## 2022-05-26 PROCEDURE — 99999 PR PBB SHADOW E&M-EST. PATIENT-LVL IV: CPT | Mod: PBBFAC,,,

## 2022-05-26 PROCEDURE — 3008F BODY MASS INDEX DOCD: CPT | Mod: CPTII,S$GLB,,

## 2022-05-26 PROCEDURE — 3061F NEG MICROALBUMINURIA REV: CPT | Mod: CPTII,S$GLB,,

## 2022-05-26 PROCEDURE — 1160F PR REVIEW ALL MEDS BY PRESCRIBER/CLIN PHARMACIST DOCUMENTED: ICD-10-PCS | Mod: CPTII,S$GLB,,

## 2022-05-26 PROCEDURE — 3061F PR NEG MICROALBUMINURIA RESULT DOCUMENTED/REVIEW: ICD-10-PCS | Mod: CPTII,S$GLB,,

## 2022-05-26 PROCEDURE — 3079F PR MOST RECENT DIASTOLIC BLOOD PRESSURE 80-89 MM HG: ICD-10-PCS | Mod: CPTII,S$GLB,,

## 2022-05-26 PROCEDURE — 3051F HG A1C>EQUAL 7.0%<8.0%: CPT | Mod: CPTII,S$GLB,,

## 2022-05-26 PROCEDURE — 29581 APPL MULTLAYER CMPRN SYS LEG: CPT | Mod: LT,S$GLB,,

## 2022-05-26 PROCEDURE — 99203 PR OFFICE/OUTPT VISIT, NEW, LEVL III, 30-44 MIN: ICD-10-PCS | Mod: 25,S$GLB,,

## 2022-05-26 PROCEDURE — 3288F FALL RISK ASSESSMENT DOCD: CPT | Mod: CPTII,S$GLB,,

## 2022-05-26 PROCEDURE — 1125F AMNT PAIN NOTED PAIN PRSNT: CPT | Mod: CPTII,S$GLB,,

## 2022-05-26 RX ORDER — CLINDAMYCIN HYDROCHLORIDE 300 MG/1
300 CAPSULE ORAL EVERY 8 HOURS
Qty: 21 CAPSULE | Refills: 0 | Status: SHIPPED | OUTPATIENT
Start: 2022-05-26 | End: 2022-05-26

## 2022-05-26 RX ORDER — CLINDAMYCIN HYDROCHLORIDE 300 MG/1
300 CAPSULE ORAL EVERY 8 HOURS
Qty: 21 CAPSULE | Refills: 0 | Status: SHIPPED | OUTPATIENT
Start: 2022-05-26 | End: 2022-06-03

## 2022-05-26 NOTE — PROGRESS NOTES
Subjective:       Patient ID: Jono Gonzalez is a 67 y.o. male. With PMHx chronic venous insufficiency, BLE venous insufficiency, BLE venous insufficiency, DM2, HTN, HLD, DAVID (on CPAP), morbid obesity    Chief Complaint: No chief complaint on file.    Patient presents for evaluation of 2 lower leg wounds. 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 Patient stated these wounds started 3 weeks ago. Reports his legs swell which causes these wounds. Reports compliance with compression stockings. He states he has not submerged his leg in water. Denies swimming. Admits to erythema, warmth, pain, and excessive drainage. Denies fever and chills    Review of Systems   Constitutional: Negative for activity change, chills, diaphoresis, fatigue and fever.   Respiratory: Negative for apnea, chest tightness and shortness of breath.    Cardiovascular: Positive for leg swelling. Negative for chest pain and palpitations.   Musculoskeletal: Negative for gait problem and joint swelling.   Skin: Positive for wound. Negative for pallor and rash.   Neurological: Negative for syncope, weakness and numbness.   Psychiatric/Behavioral: Negative for agitation. The patient is not nervous/anxious.    All other systems reviewed and are negative.      Objective:      Physical Exam  Vitals reviewed.   Constitutional:       General: He is not in acute distress.     Appearance: Normal appearance.   Cardiovascular:      Rate and Rhythm: Normal rate and regular rhythm.      Pulses: Normal pulses.   Pulmonary:      Effort: No respiratory distress.   Musculoskeletal:         General: No swelling.      Right lower leg: Edema present.      Left lower leg: Edema present.   Skin:     General: Skin is warm and dry.      Capillary Refill: Capillary refill takes less than 2 seconds.      Findings: Erythema and wound present.             Comments: Heat palpated to left lower leg   Neurological:      General: No focal  deficit present.      Mental Status: He is alert and oriented to person, place, and time.   Psychiatric:         Mood and Affect: Mood normal.         Behavior: Behavior normal.         Thought Content: Thought content normal.         Judgment: Judgment normal.         Assessment:       1. Cellulitis of left leg    2. Venous stasis ulcer of left lower extremity    3. Primary hypertension    4. Chronic venous insufficiency    5. Lymphedema of both lower extremities    6. Type 2 diabetes mellitus without complication, without long-term current use of insulin           Wound Left anterior;lower Leg (Active)        Pre-existing:    Primary Wound Type:    Side: Left   Orientation: anterior;lower   Location: Leg   Wound Number:    Ankle-Brachial Index:    Pulses:    Removal Indication and Assessment:    Wound Outcome:    (Retired) Wound Type:    (Retired) Wound Length (cm):    (Retired) Wound Width (cm):    (Retired) Depth (cm):    Wound Description (Comments):    Removal Indications:    Wound Image   05/26/22 1632   Dressing Appearance Dry;Intact;Moist drainage 05/26/22 1632   Drainage Amount Large 05/26/22 1632   Drainage Characteristics/Odor Brown;Serosanguineous;Purulent 05/26/22 1632   Red (%), Wound Tissue Color 25 % 05/26/22 1632   Yellow (%), Wound Tissue Color 75 % 05/26/22 1632   Periwound Area Macerated;Moist;Pustules;Redness;Edematous;Warm;Intact 05/26/22 1632   Wound Length (cm) 4 cm 05/26/22 1632   Wound Width (cm) 3 cm 05/26/22 1632   Wound Surface Area (cm^2) 12 cm^2 05/26/22 1632   Care Cleansed with:;Soap and water 05/26/22 1632   Dressing Applied;Absorptive Pad;Compression wrap;Rolled gauze 05/26/22 1632   Compression Three layer compression 05/26/22 1632   Dressing Change Due 06/02/22 05/26/22 1632            Wound Left distal;posterior Calf (Active)        Pre-existing:    Primary Wound Type:    Side: Left   Orientation: distal;posterior   Location: Calf   Wound Number:    Ankle-Brachial Index:     Pulses:    Removal Indication and Assessment:    Wound Outcome:    (Retired) Wound Type:    (Retired) Wound Length (cm):    (Retired) Wound Width (cm):    (Retired) Depth (cm):    Wound Description (Comments):    Removal Indications:    Wound Image   05/26/22 1632   Dressing Appearance Dry;Intact;Clean;Moist drainage 05/26/22 1632   Drainage Amount Moderate 05/26/22 1632   Drainage Characteristics/Odor Brown;Serosanguineous;Purulent 05/26/22 1632   Red (%), Wound Tissue Color 100 % 05/26/22 1632   Periwound Area Intact;Pustules;Redness;Excoriated;Warm 05/26/22 1632   Wound Length (cm) 5 cm 05/26/22 1632   Wound Width (cm) 4 cm 05/26/22 1632   Wound Surface Area (cm^2) 20 cm^2 05/26/22 1632   Care Cleansed with:;Wound cleanser 05/26/22 1632   Dressing Applied;Absorptive Pad;Rolled gauze;Compression wrap 05/26/22 1632   Compression Three layer compression 05/26/22 1632   Dressing Change Due 06/02/22 05/26/22 1632      Mr. Gonzalez was seen in clinic room placed is sitting position with legs elevated in treatment chair, dressing removed and wound was cleaned with wound cleanser.Evaluated. With a surgical marker, marked the edge of the surrounding erythema.     Verbal wound care treatment order was given to apply Aquacle Ag to the wound bed, apply zinc coflex wrap to left lower leg. Patient foot was positioned at a 90 degree angle, a calamine coflex wrap was applied followed by coban cohesive outer layer was applied using a spiral technique avoiding creases of folds. Each layer was started behind the first metatarsal and ended below the tibial tubercle knee. There ws overlap of each turn half the width of the previous turn. Return to clinic on 06/02/2022     Plan:       Will revaluate leg and surrounding erythema next week.  Prescribed Clinda 300 mg every 8 hours for 7 days for suspected cellulitis. Reviewed side effects of Clinda. According to UpToDate cellulitis algorithm, suggested treatment includes Doxy, Bactrim,  or Clinda. Unable to prescribe doxy or bactrim due to interactions with DM medication and possible s/e of hypoglycemia. Instructed to call if symptoms worsen including systemic symptoms. Patient voiced understanding.    Patient instructed to elevated legs when sitting for prolonged periods of time.  Instructed to stop construction on home.  Patient was warned not to get the dressings wet and to use cast covers for showering.  Should the dressing become wet, she is to remove it by unrolling each layer from the top going down, shower, pat dry, place a wet-to-dry dressing over the wound, cover with gauze and roll gauze and use ace wraps for compression and to secure bandages and  notify this office as soon as possible to have a new dressing applied.    RTC next week    Gracie Álvarez PA-C

## 2022-06-02 ENCOUNTER — OFFICE VISIT (OUTPATIENT)
Dept: WOUND CARE | Facility: CLINIC | Age: 67
End: 2022-06-02
Payer: COMMERCIAL

## 2022-06-02 ENCOUNTER — PATIENT MESSAGE (OUTPATIENT)
Dept: WOUND CARE | Facility: CLINIC | Age: 67
End: 2022-06-02

## 2022-06-02 VITALS
DIASTOLIC BLOOD PRESSURE: 68 MMHG | TEMPERATURE: 98 F | WEIGHT: 315 LBS | HEIGHT: 67 IN | HEART RATE: 93 BPM | SYSTOLIC BLOOD PRESSURE: 139 MMHG | BODY MASS INDEX: 49.44 KG/M2

## 2022-06-02 DIAGNOSIS — L97.929 VENOUS STASIS ULCER OF LEFT LOWER EXTREMITY: ICD-10-CM

## 2022-06-02 DIAGNOSIS — I83.029 VENOUS STASIS ULCER OF LEFT LOWER EXTREMITY: ICD-10-CM

## 2022-06-02 DIAGNOSIS — I87.2 CHRONIC VENOUS INSUFFICIENCY: ICD-10-CM

## 2022-06-02 DIAGNOSIS — I10 PRIMARY HYPERTENSION: ICD-10-CM

## 2022-06-02 DIAGNOSIS — E11.9 TYPE 2 DIABETES MELLITUS WITHOUT COMPLICATION, WITHOUT LONG-TERM CURRENT USE OF INSULIN: ICD-10-CM

## 2022-06-02 DIAGNOSIS — I89.0 LYMPHEDEMA OF BOTH LOWER EXTREMITIES: ICD-10-CM

## 2022-06-02 DIAGNOSIS — L03.116 CELLULITIS OF LEFT LEG: Primary | ICD-10-CM

## 2022-06-02 DIAGNOSIS — E78.2 MIXED HYPERLIPIDEMIA: ICD-10-CM

## 2022-06-02 LAB
GRAM STN SPEC: NORMAL
GRAM STN SPEC: NORMAL

## 2022-06-02 PROCEDURE — 1125F AMNT PAIN NOTED PAIN PRSNT: CPT | Mod: CPTII,S$GLB,,

## 2022-06-02 PROCEDURE — 3051F HG A1C>EQUAL 7.0%<8.0%: CPT | Mod: CPTII,S$GLB,,

## 2022-06-02 PROCEDURE — 3075F PR MOST RECENT SYSTOLIC BLOOD PRESS GE 130-139MM HG: ICD-10-PCS | Mod: CPTII,S$GLB,,

## 2022-06-02 PROCEDURE — 3008F PR BODY MASS INDEX (BMI) DOCUMENTED: ICD-10-PCS | Mod: CPTII,S$GLB,,

## 2022-06-02 PROCEDURE — 99214 OFFICE O/P EST MOD 30 MIN: CPT | Mod: S$GLB,,,

## 2022-06-02 PROCEDURE — 1101F PT FALLS ASSESS-DOCD LE1/YR: CPT | Mod: CPTII,S$GLB,,

## 2022-06-02 PROCEDURE — 3078F DIAST BP <80 MM HG: CPT | Mod: CPTII,S$GLB,,

## 2022-06-02 PROCEDURE — 3061F PR NEG MICROALBUMINURIA RESULT DOCUMENTED/REVIEW: ICD-10-PCS | Mod: CPTII,S$GLB,,

## 2022-06-02 PROCEDURE — 99214 PR OFFICE/OUTPT VISIT, EST, LEVL IV, 30-39 MIN: ICD-10-PCS | Mod: S$GLB,,,

## 2022-06-02 PROCEDURE — 87075 CULTR BACTERIA EXCEPT BLOOD: CPT

## 2022-06-02 PROCEDURE — 87077 CULTURE AEROBIC IDENTIFY: CPT | Mod: 59

## 2022-06-02 PROCEDURE — 1125F PR PAIN SEVERITY QUANTIFIED, PAIN PRESENT: ICD-10-PCS | Mod: CPTII,S$GLB,,

## 2022-06-02 PROCEDURE — 3072F PR LOW RISK FOR RETINOPATHY: ICD-10-PCS | Mod: CPTII,S$GLB,,

## 2022-06-02 PROCEDURE — 3008F BODY MASS INDEX DOCD: CPT | Mod: CPTII,S$GLB,,

## 2022-06-02 PROCEDURE — 3051F PR MOST RECENT HEMOGLOBIN A1C LEVEL 7.0 - < 8.0%: ICD-10-PCS | Mod: CPTII,S$GLB,,

## 2022-06-02 PROCEDURE — 4010F PR ACE/ARB THEARPY RXD/TAKEN: ICD-10-PCS | Mod: CPTII,S$GLB,,

## 2022-06-02 PROCEDURE — 87070 CULTURE OTHR SPECIMN AEROBIC: CPT

## 2022-06-02 PROCEDURE — 87102 FUNGUS ISOLATION CULTURE: CPT

## 2022-06-02 PROCEDURE — 3061F NEG MICROALBUMINURIA REV: CPT | Mod: CPTII,S$GLB,,

## 2022-06-02 PROCEDURE — 1101F PR PT FALLS ASSESS DOC 0-1 FALLS W/OUT INJ PAST YR: ICD-10-PCS | Mod: CPTII,S$GLB,,

## 2022-06-02 PROCEDURE — 3066F PR DOCUMENTATION OF TREATMENT FOR NEPHROPATHY: ICD-10-PCS | Mod: CPTII,S$GLB,,

## 2022-06-02 PROCEDURE — 99999 PR PBB SHADOW E&M-EST. PATIENT-LVL V: ICD-10-PCS | Mod: PBBFAC,,,

## 2022-06-02 PROCEDURE — 3072F LOW RISK FOR RETINOPATHY: CPT | Mod: CPTII,S$GLB,,

## 2022-06-02 PROCEDURE — 4010F ACE/ARB THERAPY RXD/TAKEN: CPT | Mod: CPTII,S$GLB,,

## 2022-06-02 PROCEDURE — 3078F PR MOST RECENT DIASTOLIC BLOOD PRESSURE < 80 MM HG: ICD-10-PCS | Mod: CPTII,S$GLB,,

## 2022-06-02 PROCEDURE — 87076 CULTURE ANAEROBE IDENT EACH: CPT

## 2022-06-02 PROCEDURE — 87205 SMEAR GRAM STAIN: CPT

## 2022-06-02 PROCEDURE — 3066F NEPHROPATHY DOC TX: CPT | Mod: CPTII,S$GLB,,

## 2022-06-02 PROCEDURE — 87186 SC STD MICRODIL/AGAR DIL: CPT | Mod: 59

## 2022-06-02 PROCEDURE — 3288F FALL RISK ASSESSMENT DOCD: CPT | Mod: CPTII,S$GLB,,

## 2022-06-02 PROCEDURE — 1159F PR MEDICATION LIST DOCUMENTED IN MEDICAL RECORD: ICD-10-PCS | Mod: CPTII,S$GLB,,

## 2022-06-02 PROCEDURE — 3075F SYST BP GE 130 - 139MM HG: CPT | Mod: CPTII,S$GLB,,

## 2022-06-02 PROCEDURE — 99999 PR PBB SHADOW E&M-EST. PATIENT-LVL V: CPT | Mod: PBBFAC,,,

## 2022-06-02 PROCEDURE — 3288F PR FALLS RISK ASSESSMENT DOCUMENTED: ICD-10-PCS | Mod: CPTII,S$GLB,,

## 2022-06-02 PROCEDURE — 1159F MED LIST DOCD IN RCRD: CPT | Mod: CPTII,S$GLB,,

## 2022-06-02 NOTE — PROGRESS NOTES
Subjective:       Patient ID: Jono Gonzalez is a 67 y.o. male. With PMHx chronic venous insufficiency, BLE venous insufficiency, BLE venous insufficiency, DM2, HTN, HLD, DAVID (on CPAP), morbid obesity    Chief Complaint: No chief complaint on file.    Patient presents for reevaluation of 2 lower leg wounds. Patient states he followed instructions of elevating his legs, and noticed the swelling went down a little. He states he has two pills left of his Clinda prescription. Reports worsening of pain, new odor present, more erythema, warmth, and excessive drainage. Denies fever or chills.      Review of Systems   Constitutional: Negative for activity change, chills, diaphoresis, fatigue and fever.   Respiratory: Negative for apnea, chest tightness and shortness of breath.    Cardiovascular: Positive for leg swelling. Negative for chest pain and palpitations.   Musculoskeletal: Negative for gait problem and joint swelling.   Skin: Positive for color change and wound. Negative for pallor and rash.   Neurological: Negative for syncope, weakness and numbness.   Psychiatric/Behavioral: Negative for agitation. The patient is not nervous/anxious.    All other systems reviewed and are negative.      Objective:      Physical Exam  Vitals reviewed.   Constitutional:       General: He is not in acute distress.     Appearance: Normal appearance.   Cardiovascular:      Rate and Rhythm: Normal rate and regular rhythm.      Pulses: Normal pulses.   Pulmonary:      Effort: No respiratory distress.   Musculoskeletal:         General: No swelling.   Skin:     General: Skin is warm and dry.      Capillary Refill: Capillary refill takes less than 2 seconds.      Findings: Wound present.             Comments: At last visit, edge of erythema marked with surgical marker. At this visit, erythema spread beyond marked edges.   Neurological:      General: No focal deficit present.      Mental Status: He is alert and oriented to person, place, and  time.   Psychiatric:         Mood and Affect: Mood normal.         Behavior: Behavior normal.         Thought Content: Thought content normal.         Judgment: Judgment normal.         Assessment:       1. Cellulitis of left leg    2. Venous stasis ulcer of left lower extremity    3. Chronic venous insufficiency    4. Lymphedema of both lower extremities    5. Primary hypertension    6. Mixed hyperlipidemia    7. Type 2 diabetes mellitus without complication, without long-term current use of insulin    8. BMI 45.0-49.9, adult           Wound Left anterior;lower Leg (Active)        Pre-existing:    Primary Wound Type:    Side: Left   Orientation: anterior;lower   Location: Leg   Wound Number:    Ankle-Brachial Index:    Pulses:    Removal Indication and Assessment:    Wound Outcome:    (Retired) Wound Type:    (Retired) Wound Length (cm):    (Retired) Wound Width (cm):    (Retired) Depth (cm):    Wound Description (Comments):    Removal Indications:    Wound Image   06/02/22 1706   Dressing Appearance Dry;Intact;Clean 06/02/22 1706   Drainage Amount Large 06/02/22 1706   Drainage Characteristics/Odor Creamy;Serosanguineous;Ha;Malodorous 06/02/22 1706   Red (%), Wound Tissue Color 80 % 06/02/22 1706   Yellow (%), Wound Tissue Color 20 % 06/02/22 1706   Periwound Area Gray;Hemosiderin Staining;Moist;Redness;Swelling 06/02/22 1706   Wound Length (cm) 5 cm 06/02/22 1706   Wound Width (cm) 4.4 cm 06/02/22 1706   Wound Surface Area (cm^2) 22 cm^2 06/02/22 1706   Care Cleansed with:;Soap and water 06/02/22 1706   Dressing Applied;Silver;Hydrofiber 06/02/22 1706   Periwound Care Moisture barrier applied;Other (see comments) 06/02/22 1706   Compression Two layer compression;Other (see comments) 06/02/22 1706            Wound Left posterior;lower Calf (Active)        Pre-existing:    Primary Wound Type:    Side: Left   Orientation: posterior;lower   Location: Calf   Wound Number:    Ankle-Brachial Index:    Pulses:     Removal Indication and Assessment:    Wound Outcome:    (Retired) Wound Type:    (Retired) Wound Length (cm):    (Retired) Wound Width (cm):    (Retired) Depth (cm):    Wound Description (Comments):    Removal Indications:    Wound Image   06/02/22 1706   Dressing Appearance Dry;Intact;Clean 06/02/22 1706   Drainage Amount Large 06/02/22 1706   Drainage Characteristics/Odor Serosanguineous;Creamy;Ha;Malodorous 06/02/22 1706   Black (%), Wound Tissue Color 20 % 06/02/22 1706   Red (%), Wound Tissue Color 80 % 06/02/22 1706   Periwound Area Swelling;Gray;Macerated;Hemosiderin Staining 06/02/22 1706   Wound Length (cm) 5.5 cm 06/02/22 1706   Wound Width (cm) 4.3 cm 06/02/22 1706   Wound Surface Area (cm^2) 23.65 cm^2 06/02/22 1706   Care Cleansed with:;Soap and water 06/02/22 1706   Dressing Applied;Silver;Hydrofiber 06/02/22 1706   Periwound Care Moisture barrier applied;Other (see comments) 06/02/22 1706   Compression Two layer compression;Other (see comments) 06/02/22 1706     Mr. Gonzalez was seen in wound care clinic today, placed is sitting position with legs elevated in treatment chair.  Patient with complaint of increased pain and wounds malodorous.  Dressing removed and left leg washed with Easi-cleanse sponge and dried thoroughly - robert-wound skin appearing to slough off while washing leg.  Evaluated and treated wounds. Consulted with Dr. Michel. He agreed patient needs IV antibiotics.    Plan of care:  Calmoseptine barrier cream to robert-wound skin, silver alginate to wound bed with two layer compression of kerlix and coban.     Patient foot positioned at a 90 degree angle.  Kerlix and coban layer applied using a spiral technique avoiding creases of folds. Each layer was started behind the first metatarsal and ended below the tibial tubercle knee. There was overlap of each turn half the width of the previous turn.    Return to clinic after admission to Emergency Department - patient failed oral antibiotic  "therapy.           Plan:     Swabbed wound and ordered anerobic, areobic, and fungal cultures with gram stain.  Patient instructed to go to Emergency Department for admission due to failing oral antibiotics. Recommending IV antibiotics. Reported that he had "stuff to do tonight" and will go to ED tomorrow at 7 am. Due to lack of systemic symptoms, Dr. Michel stated he could go to the ED tomorrow.      Left lower extremity dressed as detailed above  Patient instructed to elevated legs while sitting  Patient warned not to get dressing wet  Patient will contact office to schedule follow up appointment  Verbal and written instructions given to patient          Gracie Álvarez PA-C                "

## 2022-06-02 NOTE — PATIENT INSTRUCTIONS
Patient instructed to elevated legs while sitting  Patient warned not to get dressing wet  Patient instructed to go to Emergency Department for admission (cellulitis - failed oral antibiotics)   Patient will contact office to schedule follow up appointment  Verbal and written instructions given to patient

## 2022-06-03 ENCOUNTER — HOSPITAL ENCOUNTER (OUTPATIENT)
Facility: OTHER | Age: 67
Discharge: HOME | End: 2022-06-04
Attending: EMERGENCY MEDICINE | Admitting: EMERGENCY MEDICINE
Payer: COMMERCIAL

## 2022-06-03 DIAGNOSIS — I87.2 VENOUS INSUFFICIENCY: ICD-10-CM

## 2022-06-03 DIAGNOSIS — L03.116 CELLULITIS OF LEFT LOWER EXTREMITY: Primary | ICD-10-CM

## 2022-06-03 DIAGNOSIS — L08.9 WOUND INFECTION: ICD-10-CM

## 2022-06-03 DIAGNOSIS — T14.8XXA WOUND INFECTION: ICD-10-CM

## 2022-06-03 DIAGNOSIS — R07.9 CHEST PAIN: ICD-10-CM

## 2022-06-03 LAB
ALBUMIN SERPL BCP-MCNC: 3.5 G/DL (ref 3.5–5.2)
ALP SERPL-CCNC: 94 U/L (ref 55–135)
ALT SERPL W/O P-5'-P-CCNC: 14 U/L (ref 10–44)
ANION GAP SERPL CALC-SCNC: 11 MMOL/L (ref 8–16)
AST SERPL-CCNC: 18 U/L (ref 10–40)
BASOPHILS # BLD AUTO: 0.02 K/UL (ref 0–0.2)
BASOPHILS NFR BLD: 0.2 % (ref 0–1.9)
BILIRUB SERPL-MCNC: 0.5 MG/DL (ref 0.1–1)
BILIRUB UR QL STRIP: NEGATIVE
BUN SERPL-MCNC: 16 MG/DL (ref 8–23)
CALCIUM SERPL-MCNC: 9.2 MG/DL (ref 8.7–10.5)
CHLORIDE SERPL-SCNC: 107 MMOL/L (ref 95–110)
CLARITY UR: CLEAR
CO2 SERPL-SCNC: 22 MMOL/L (ref 23–29)
COLOR UR: YELLOW
CREAT SERPL-MCNC: 0.8 MG/DL (ref 0.5–1.4)
DIFFERENTIAL METHOD: ABNORMAL
EOSINOPHIL # BLD AUTO: 0.1 K/UL (ref 0–0.5)
EOSINOPHIL NFR BLD: 0.9 % (ref 0–8)
ERYTHROCYTE [DISTWIDTH] IN BLOOD BY AUTOMATED COUNT: 14.5 % (ref 11.5–14.5)
EST. GFR  (AFRICAN AMERICAN): >60 ML/MIN/1.73 M^2
EST. GFR  (NON AFRICAN AMERICAN): >60 ML/MIN/1.73 M^2
GLUCOSE SERPL-MCNC: 124 MG/DL (ref 70–110)
GLUCOSE UR QL STRIP: NEGATIVE
HCT VFR BLD AUTO: 42.8 % (ref 40–54)
HGB BLD-MCNC: 13.9 G/DL (ref 14–18)
HGB UR QL STRIP: NEGATIVE
HIV 1+2 AB+HIV1 P24 AG SERPL QL IA: NEGATIVE
IMM GRANULOCYTES # BLD AUTO: 0.05 K/UL (ref 0–0.04)
IMM GRANULOCYTES NFR BLD AUTO: 0.6 % (ref 0–0.5)
KETONES UR QL STRIP: NEGATIVE
LACTATE SERPL-SCNC: 1.1 MMOL/L (ref 0.5–2.2)
LEUKOCYTE ESTERASE UR QL STRIP: NEGATIVE
LYMPHOCYTES # BLD AUTO: 1.3 K/UL (ref 1–4.8)
LYMPHOCYTES NFR BLD: 15.3 % (ref 18–48)
MAGNESIUM SERPL-MCNC: 2.1 MG/DL (ref 1.6–2.6)
MCH RBC QN AUTO: 28.1 PG (ref 27–31)
MCHC RBC AUTO-ENTMCNC: 32.5 G/DL (ref 32–36)
MCV RBC AUTO: 87 FL (ref 82–98)
MONOCYTES # BLD AUTO: 0.6 K/UL (ref 0.3–1)
MONOCYTES NFR BLD: 6.9 % (ref 4–15)
NEUTROPHILS # BLD AUTO: 6.5 K/UL (ref 1.8–7.7)
NEUTROPHILS NFR BLD: 76.1 % (ref 38–73)
NITRITE UR QL STRIP: NEGATIVE
NRBC BLD-RTO: 0 /100 WBC
PH UR STRIP: 6 [PH] (ref 5–8)
PHOSPHATE SERPL-MCNC: 2.7 MG/DL (ref 2.7–4.5)
PLATELET # BLD AUTO: 284 K/UL (ref 150–450)
PMV BLD AUTO: 9.3 FL (ref 9.2–12.9)
POCT GLUCOSE: 103 MG/DL (ref 70–110)
POCT GLUCOSE: 113 MG/DL (ref 70–110)
POCT GLUCOSE: 92 MG/DL (ref 70–110)
POTASSIUM SERPL-SCNC: 4.1 MMOL/L (ref 3.5–5.1)
PROT SERPL-MCNC: 7.4 G/DL (ref 6–8.4)
PROT UR QL STRIP: NEGATIVE
RBC # BLD AUTO: 4.95 M/UL (ref 4.6–6.2)
SODIUM SERPL-SCNC: 140 MMOL/L (ref 136–145)
SP GR UR STRIP: 1.01 (ref 1–1.03)
URN SPEC COLLECT METH UR: NORMAL
UROBILINOGEN UR STRIP-ACNC: NEGATIVE EU/DL
WBC # BLD AUTO: 8.54 K/UL (ref 3.9–12.7)

## 2022-06-03 PROCEDURE — 63600175 PHARM REV CODE 636 W HCPCS: Performed by: EMERGENCY MEDICINE

## 2022-06-03 PROCEDURE — 83735 ASSAY OF MAGNESIUM: CPT | Performed by: EMERGENCY MEDICINE

## 2022-06-03 PROCEDURE — 83605 ASSAY OF LACTIC ACID: CPT | Performed by: EMERGENCY MEDICINE

## 2022-06-03 PROCEDURE — G0378 HOSPITAL OBSERVATION PER HR: HCPCS

## 2022-06-03 PROCEDURE — 63600175 PHARM REV CODE 636 W HCPCS: Performed by: PHYSICIAN ASSISTANT

## 2022-06-03 PROCEDURE — 96372 THER/PROPH/DIAG INJ SC/IM: CPT | Performed by: PHYSICIAN ASSISTANT

## 2022-06-03 PROCEDURE — 99220 PR INITIAL OBSERVATION CARE,LEVL III: CPT | Mod: ,,, | Performed by: PHYSICIAN ASSISTANT

## 2022-06-03 PROCEDURE — 94761 N-INVAS EAR/PLS OXIMETRY MLT: CPT

## 2022-06-03 PROCEDURE — 87389 HIV-1 AG W/HIV-1&-2 AB AG IA: CPT | Performed by: EMERGENCY MEDICINE

## 2022-06-03 PROCEDURE — 25000003 PHARM REV CODE 250: Performed by: PHYSICIAN ASSISTANT

## 2022-06-03 PROCEDURE — 96367 TX/PROPH/DG ADDL SEQ IV INF: CPT

## 2022-06-03 PROCEDURE — 96365 THER/PROPH/DIAG IV INF INIT: CPT

## 2022-06-03 PROCEDURE — 99900035 HC TECH TIME PER 15 MIN (STAT)

## 2022-06-03 PROCEDURE — 85025 COMPLETE CBC W/AUTO DIFF WBC: CPT | Performed by: EMERGENCY MEDICINE

## 2022-06-03 PROCEDURE — 99220 PR INITIAL OBSERVATION CARE,LEVL III: ICD-10-PCS | Mod: ,,, | Performed by: PHYSICIAN ASSISTANT

## 2022-06-03 PROCEDURE — 84100 ASSAY OF PHOSPHORUS: CPT | Performed by: EMERGENCY MEDICINE

## 2022-06-03 PROCEDURE — 96366 THER/PROPH/DIAG IV INF ADDON: CPT

## 2022-06-03 PROCEDURE — 94660 CPAP INITIATION&MGMT: CPT

## 2022-06-03 PROCEDURE — 81003 URINALYSIS AUTO W/O SCOPE: CPT | Performed by: EMERGENCY MEDICINE

## 2022-06-03 PROCEDURE — 87040 BLOOD CULTURE FOR BACTERIA: CPT | Mod: 59 | Performed by: EMERGENCY MEDICINE

## 2022-06-03 PROCEDURE — 25000003 PHARM REV CODE 250: Performed by: EMERGENCY MEDICINE

## 2022-06-03 PROCEDURE — 80053 COMPREHEN METABOLIC PANEL: CPT | Performed by: EMERGENCY MEDICINE

## 2022-06-03 PROCEDURE — 99285 EMERGENCY DEPT VISIT HI MDM: CPT | Mod: 25

## 2022-06-03 PROCEDURE — 27000190 HC CPAP FULL FACE MASK W/VALVE

## 2022-06-03 RX ORDER — ENOXAPARIN SODIUM 100 MG/ML
40 INJECTION SUBCUTANEOUS EVERY 12 HOURS
Status: DISCONTINUED | OUTPATIENT
Start: 2022-06-03 | End: 2022-06-04 | Stop reason: HOSPADM

## 2022-06-03 RX ORDER — SODIUM CHLORIDE 0.9 % (FLUSH) 0.9 %
10 SYRINGE (ML) INJECTION
Status: DISCONTINUED | OUTPATIENT
Start: 2022-06-03 | End: 2022-06-04 | Stop reason: HOSPADM

## 2022-06-03 RX ORDER — ONDANSETRON 8 MG/1
8 TABLET, ORALLY DISINTEGRATING ORAL EVERY 8 HOURS PRN
Status: DISCONTINUED | OUTPATIENT
Start: 2022-06-03 | End: 2022-06-04 | Stop reason: HOSPADM

## 2022-06-03 RX ORDER — ANASTROZOLE 1 MG/1
1 TABLET ORAL DAILY
Status: DISCONTINUED | OUTPATIENT
Start: 2022-06-04 | End: 2022-06-04 | Stop reason: HOSPADM

## 2022-06-03 RX ORDER — PREGABALIN 75 MG/1
75 CAPSULE ORAL 3 TIMES DAILY
Status: DISCONTINUED | OUTPATIENT
Start: 2022-06-03 | End: 2022-06-04 | Stop reason: HOSPADM

## 2022-06-03 RX ORDER — ASPIRIN 81 MG/1
81 TABLET ORAL DAILY
Status: DISCONTINUED | OUTPATIENT
Start: 2022-06-04 | End: 2022-06-04 | Stop reason: HOSPADM

## 2022-06-03 RX ORDER — POLYETHYLENE GLYCOL 3350 17 G/17G
17 POWDER, FOR SOLUTION ORAL DAILY PRN
Status: DISCONTINUED | OUTPATIENT
Start: 2022-06-03 | End: 2022-06-04 | Stop reason: HOSPADM

## 2022-06-03 RX ORDER — FINASTERIDE 5 MG/1
5 TABLET, FILM COATED ORAL DAILY
Status: DISCONTINUED | OUTPATIENT
Start: 2022-06-04 | End: 2022-06-04 | Stop reason: HOSPADM

## 2022-06-03 RX ORDER — HYDROCODONE BITARTRATE AND ACETAMINOPHEN 5; 325 MG/1; MG/1
1 TABLET ORAL
Status: COMPLETED | OUTPATIENT
Start: 2022-06-03 | End: 2022-06-03

## 2022-06-03 RX ORDER — TALC
6 POWDER (GRAM) TOPICAL NIGHTLY PRN
Status: DISCONTINUED | OUTPATIENT
Start: 2022-06-03 | End: 2022-06-04 | Stop reason: HOSPADM

## 2022-06-03 RX ORDER — NALOXONE HCL 0.4 MG/ML
0.02 VIAL (ML) INJECTION
Status: DISCONTINUED | OUTPATIENT
Start: 2022-06-03 | End: 2022-06-04 | Stop reason: HOSPADM

## 2022-06-03 RX ORDER — IBUPROFEN 200 MG
24 TABLET ORAL
Status: DISCONTINUED | OUTPATIENT
Start: 2022-06-03 | End: 2022-06-04 | Stop reason: HOSPADM

## 2022-06-03 RX ORDER — AMLODIPINE BESYLATE 5 MG/1
10 TABLET ORAL DAILY
Status: DISCONTINUED | OUTPATIENT
Start: 2022-06-04 | End: 2022-06-04 | Stop reason: HOSPADM

## 2022-06-03 RX ORDER — TAMSULOSIN HYDROCHLORIDE 0.4 MG/1
0.4 CAPSULE ORAL DAILY
Status: DISCONTINUED | OUTPATIENT
Start: 2022-06-04 | End: 2022-06-04 | Stop reason: HOSPADM

## 2022-06-03 RX ORDER — LOSARTAN POTASSIUM 50 MG/1
100 TABLET ORAL DAILY
Refills: 3 | Status: DISCONTINUED | OUTPATIENT
Start: 2022-06-04 | End: 2022-06-04 | Stop reason: HOSPADM

## 2022-06-03 RX ORDER — FUROSEMIDE 40 MG/1
40 TABLET ORAL 2 TIMES DAILY WITH MEALS
Status: DISCONTINUED | OUTPATIENT
Start: 2022-06-03 | End: 2022-06-04 | Stop reason: HOSPADM

## 2022-06-03 RX ORDER — HYDROCODONE BITARTRATE AND ACETAMINOPHEN 10; 325 MG/1; MG/1
1 TABLET ORAL EVERY 6 HOURS PRN
Status: DISCONTINUED | OUTPATIENT
Start: 2022-06-03 | End: 2022-06-04 | Stop reason: HOSPADM

## 2022-06-03 RX ORDER — ASPIRIN 81 MG/1
81 TABLET ORAL DAILY
COMMUNITY

## 2022-06-03 RX ORDER — ACETAMINOPHEN 325 MG/1
650 TABLET ORAL EVERY 4 HOURS PRN
Status: DISCONTINUED | OUTPATIENT
Start: 2022-06-03 | End: 2022-06-04 | Stop reason: HOSPADM

## 2022-06-03 RX ORDER — IBUPROFEN 200 MG
16 TABLET ORAL
Status: DISCONTINUED | OUTPATIENT
Start: 2022-06-03 | End: 2022-06-04 | Stop reason: HOSPADM

## 2022-06-03 RX ORDER — HYDROCODONE BITARTRATE AND ACETAMINOPHEN 5; 325 MG/1; MG/1
1 TABLET ORAL EVERY 6 HOURS PRN
Status: DISCONTINUED | OUTPATIENT
Start: 2022-06-03 | End: 2022-06-04 | Stop reason: HOSPADM

## 2022-06-03 RX ORDER — GLUCAGON 1 MG
1 KIT INJECTION
Status: DISCONTINUED | OUTPATIENT
Start: 2022-06-03 | End: 2022-06-04 | Stop reason: HOSPADM

## 2022-06-03 RX ORDER — ALLOPURINOL 300 MG/1
300 TABLET ORAL DAILY
Status: DISCONTINUED | OUTPATIENT
Start: 2022-06-04 | End: 2022-06-04 | Stop reason: HOSPADM

## 2022-06-03 RX ORDER — INSULIN ASPART 100 [IU]/ML
0-5 INJECTION, SOLUTION INTRAVENOUS; SUBCUTANEOUS
Status: DISCONTINUED | OUTPATIENT
Start: 2022-06-03 | End: 2022-06-04 | Stop reason: HOSPADM

## 2022-06-03 RX ORDER — ATORVASTATIN CALCIUM 20 MG/1
40 TABLET, FILM COATED ORAL NIGHTLY
Status: DISCONTINUED | OUTPATIENT
Start: 2022-06-03 | End: 2022-06-04 | Stop reason: HOSPADM

## 2022-06-03 RX ADMIN — VANCOMYCIN HYDROCHLORIDE 2000 MG: 500 INJECTION, POWDER, LYOPHILIZED, FOR SOLUTION INTRAVENOUS at 09:06

## 2022-06-03 RX ADMIN — FUROSEMIDE 40 MG: 40 TABLET ORAL at 05:06

## 2022-06-03 RX ADMIN — PREGABALIN 75 MG: 75 CAPSULE ORAL at 09:06

## 2022-06-03 RX ADMIN — PIPERACILLIN AND TAZOBACTAM 4.5 G: 4; .5 INJECTION, POWDER, LYOPHILIZED, FOR SOLUTION INTRAVENOUS; PARENTERAL at 04:06

## 2022-06-03 RX ADMIN — ENOXAPARIN SODIUM 40 MG: 40 INJECTION SUBCUTANEOUS at 01:06

## 2022-06-03 RX ADMIN — HYDROCODONE BITARTRATE AND ACETAMINOPHEN 1 TABLET: 10; 325 TABLET ORAL at 03:06

## 2022-06-03 RX ADMIN — PREGABALIN 75 MG: 75 CAPSULE ORAL at 03:06

## 2022-06-03 RX ADMIN — HYDROCODONE BITARTRATE AND ACETAMINOPHEN 1 TABLET: 10; 325 TABLET ORAL at 09:06

## 2022-06-03 RX ADMIN — ENOXAPARIN SODIUM 40 MG: 40 INJECTION SUBCUTANEOUS at 09:06

## 2022-06-03 RX ADMIN — ATORVASTATIN CALCIUM 40 MG: 20 TABLET, FILM COATED ORAL at 09:06

## 2022-06-03 RX ADMIN — HYDROCODONE BITARTRATE AND ACETAMINOPHEN 1 TABLET: 5; 325 TABLET ORAL at 09:06

## 2022-06-03 RX ADMIN — PIPERACILLIN AND TAZOBACTAM 4.5 G: 4; .5 INJECTION, POWDER, LYOPHILIZED, FOR SOLUTION INTRAVENOUS; PARENTERAL at 08:06

## 2022-06-03 NOTE — ED PROVIDER NOTES
Encounter Date: 6/3/2022    SCRIBE #1 NOTE: Aline HENDERSON, am scribing for, and in the presence of, Harika Loving MD.       History     Chief Complaint   Patient presents with    Wound Infection     Pt has wound to left lower leg. Wound care MD instructed pt come to ER for admit for failing outpt antibiotic therapy with clindamycin. Completed PO antibiotics. Denies fever, weakness. Unable to visualize wound     Time seen by provider: 8:00 AM    Jono Gonzalez is a 67 y.o. male, with a PMHx of HTN and DM, who presents to the ED with swelling and wounds to his left lower leg. He states that he has 2 wounds on the front and back of his lower left leg. The patient states that he finished his course of clindamycin yesterday and saw some swelling improvement, but notes the swelling began to worsen again.  The patient reports he was instructed by wound care yesterday to report to the ER for failing output antibiotic therapy with clindamycin. He denies fever/chills, weakness, or body aches. The patient notes that he had a similar incident a few years ago. This is the extent of the patient's complaints today in the Emergency Department.     The history is provided by the patient.      Review of patient's allergies indicates:  No Known Allergies  Past Medical History:   Diagnosis Date    Diabetes mellitus     Gout attack     Hypertension      Past Surgical History:   Procedure Laterality Date    BACK SURGERY      lumbar fusion      X 2     Family History   Problem Relation Age of Onset    Diabetes Mother     Heart disease Mother     Thyroid disease Mother     Diabetes Sister     Heart disease Father     Diabetes Father     Heart disease Brother     Heart disease Sister     Diabetes Sister     Stroke Sister      Social History     Tobacco Use    Smoking status: Never Smoker    Smokeless tobacco: Never Used   Substance Use Topics    Alcohol use: Yes     Comment: occ    Drug use: No     Review of Systems    Constitutional: Negative for chills and fever.   HENT: Negative for congestion.    Eyes: Negative for discharge and itching.   Respiratory: Negative for shortness of breath.    Cardiovascular: Negative for chest pain.   Gastrointestinal: Negative for abdominal pain.   Genitourinary: Negative for dysuria, frequency, hematuria and urgency.   Musculoskeletal: Positive for joint swelling (left lower leg).   Skin: Positive for wound (2 on left lower leg).   Neurological: Negative for dizziness.   Psychiatric/Behavioral: Negative for confusion.   All other systems reviewed and are negative.      Physical Exam     Initial Vitals [06/03/22 0720]   BP Pulse Resp Temp SpO2   (!) 158/76 95 20 97.6 °F (36.4 °C) 96 %      MAP       --         Physical Exam    Nursing note and vitals reviewed.  Constitutional: He appears well-developed and well-nourished. He does not have a sickly appearance. No distress.   HENT:   Head: Normocephalic and atraumatic.   Right Ear: External ear normal.   Left Ear: External ear normal.   Eyes: Conjunctivae, EOM and lids are normal. Right eye exhibits no discharge. Left eye exhibits no discharge. Right conjunctiva is not injected. Right conjunctiva has no hemorrhage. Left conjunctiva is not injected. Left conjunctiva has no hemorrhage. No scleral icterus.   Neck: Phonation normal. No stridor present. No tracheal deviation present.   Normal range of motion.  Cardiovascular: Normal rate, regular rhythm and normal heart sounds. Exam reveals no friction rub.    No murmur heard.  Pulses:       Radial pulses are 2+ on the right side and 2+ on the left side.        Dorsalis pedis pulses are 2+ on the right side and 2+ on the left side.   Pulmonary/Chest: Breath sounds normal. No respiratory distress. He has no wheezes. He has no rhonchi. He has no rales.   Musculoskeletal:      Cervical back: Normal range of motion.      Comments: 1+ pitting edema.     Neurological: He is alert and oriented to person,  place, and time. He has normal strength. GCS eye subscore is 4. GCS verbal subscore is 5. GCS motor subscore is 6.   Skin: Skin is warm.   Erythema and induration to distal lower left leg. Two 6 cm wounds on anterior and posterior lower extremity.   Psychiatric: He has a normal mood and affect. His speech is normal and behavior is normal. Judgment and thought content normal. Cognition and memory are normal.         ED Course   Procedures  Labs Reviewed   CBC W/ AUTO DIFFERENTIAL - Abnormal; Notable for the following components:       Result Value    Hemoglobin 13.9 (*)     Immature Granulocytes 0.6 (*)     Immature Grans (Abs) 0.05 (*)     Gran % 76.1 (*)     Lymph % 15.3 (*)     All other components within normal limits   COMPREHENSIVE METABOLIC PANEL - Abnormal; Notable for the following components:    CO2 22 (*)     Glucose 124 (*)     All other components within normal limits   CULTURE, BLOOD   CULTURE, BLOOD   LACTIC ACID, PLASMA   MAGNESIUM   PHOSPHORUS   URINALYSIS, REFLEX TO URINE CULTURE    Narrative:     Specimen Source->Urine   HIV 1 / 2 ANTIBODY    Narrative:     Release to patient->Immediate     EKG Readings: (Independently Interpreted)            Imaging Results          X-Ray Chest 1 View (Final result)  Result time 06/03/22 08:11:56    Final result by Christiano Metcalf MD (06/03/22 08:11:56)                 Impression:      Nonspecific patchy basilar opacities could relate to atelectasis, aspiration, or pneumonia.      Electronically signed by: Christiano Metcalf  Date:    06/03/2022  Time:    08:11             Narrative:    EXAMINATION:  XR CHEST 1 VIEW    CLINICAL HISTORY:  Sepsis;    TECHNIQUE:  Single frontal view of the chest was performed.    COMPARISON:  Chest radiograph 02/07/2016    FINDINGS:  Monitoring leads overlie the chest.  Grossly unchanged cardiomediastinal contours, again noting enlargement the cardiac silhouette.  Patchy opacities suggested in the lung bases.  No definite pneumothorax  or large volume pleural effusion.  No acute findings identified in the visualized abdomen.  Osseous and soft tissue structures appear without definite acute abnormality.                              X-Rays:     Medications   piperacillin-tazobactam 4.5 g in dextrose 5 % 100 mL IVPB (ready to mix system) (0 g Intravenous Stopped 6/3/22 6285)   allopurinoL tablet 300 mg (has no administration in time range)   amLODIPine tablet 10 mg (has no administration in time range)   anastrozole tablet 1 mg (has no administration in time range)   aspirin EC tablet 81 mg (has no administration in time range)   atorvastatin tablet 40 mg (has no administration in time range)   finasteride tablet 5 mg (has no administration in time range)   furosemide tablet 40 mg (has no administration in time range)   HYDROcodone-acetaminophen 5-325 mg per tablet 1 tablet (has no administration in time range)   HYDROcodone-acetaminophen  mg per tablet 1 tablet (has no administration in time range)   losartan tablet 100 mg (has no administration in time range)   pregabalin capsule 75 mg (has no administration in time range)   tamsulosin 24 hr capsule 0.4 mg (has no administration in time range)   sodium chloride 0.9% flush 10 mL (has no administration in time range)   melatonin tablet 6 mg (has no administration in time range)   polyethylene glycol packet 17 g (has no administration in time range)   acetaminophen tablet 650 mg (has no administration in time range)   naloxone 0.4 mg/mL injection 0.02 mg (has no administration in time range)   enoxaparin injection 40 mg (40 mg Subcutaneous Given 6/3/22 1301)   ondansetron disintegrating tablet 8 mg (has no administration in time range)   insulin aspart U-100 pen 0-5 Units (has no administration in time range)   glucose chewable tablet 16 g (has no administration in time range)   glucose chewable tablet 24 g (has no administration in time range)   dextrose 10% bolus 125 mL (has no administration  in time range)   dextrose 10% bolus 250 mL (has no administration in time range)   dextrose 10% bolus 125 mL (has no administration in time range)   dextrose 10% bolus 250 mL (has no administration in time range)   glucagon (human recombinant) injection 1 mg (has no administration in time range)   vancomycin 2 g in dextrose 5 % 500 mL IVPB (0 mg Intravenous Stopped 6/3/22 1112)   HYDROcodone-acetaminophen 5-325 mg per tablet 1 tablet (1 tablet Oral Given 6/3/22 0910)     Medical Decision Making:   History:   Old Medical Records: I decided to obtain old medical records.  Initial Assessment:   67-year-old male with history of venous insufficiency with to poorly healing wounds to his left lower extremity with overlying cellulitis was sent from [facility] for admission for IV antibiotics.  The patient failed outpatient clindamycin.  He denies any systemic symptoms.  Patient afebrile, hemodynamically stable and well-appearing on exam.  Independently Interpreted Test(s):   I have ordered and independently interpreted X-rays - see prior notes.  Clinical Tests:   Lab Tests: Ordered and Reviewed  Radiological Study: Ordered and Reviewed  ED Management:  Sepsis code initiated.  Wound care consult it for dressing of the wounds.  Vanc and Zosyn given in the emergency department.  Patient noted to the hospitalist service.          Scribe Attestation:   Scribe #1: I performed the above scribed service and the documentation accurately describes the services I performed. I attest to the accuracy of the note.               Physician Attestation for Scribe: I, Harika Loving  , reviewed documentation as scribed in my presence, which is both accurate and complete.    Clinical Impression:   Final diagnoses:  [L03.116] Cellulitis of left lower extremity (Primary)  [I87.2] Venous insufficiency  [R07.9] Chest pain          ED Disposition Condition    Observation               Harika Loving MD  06/03/22 3757

## 2022-06-03 NOTE — ED NOTES
Pt states he takes lasix for edema, and his left leg usually swells worse than the right. Pt states states hes remodeling his home and he hit his leg ab out two weeks ago. Pt was wearing compression stockings and placed a nonadherent dressing where he had hit his leg. Pt states his MD told him he developed an infection where the dressings were, and requested that he come to the hospital for IV antibiotics. Pt has a large ulcer to the front of his left lower leg and a large wound to the back. Wound bed is pink and drainage is yellow. Photos taken and updated on chart. Dressing reinforced, wound care consulted. Pt on full monitors,appears without distress. Will cont to monitor.

## 2022-06-03 NOTE — H&P
[facility]  [facility] Physical    Patient Name: Jono Gonzalez  MRN: [id number]  Patient Class: OP- Observation  Admission Date: 6/3/2022  Attending Physician: Arabella Davis MD   Primary Care Provider: Jim Linn MD         Patient information was obtained from patient, past medical records and ER records.     Subjective:     Principal Problem:Wound infection    Chief Complaint:   Chief Complaint   Patient presents with    Wound Infection     Pt has wound to left lower leg. Wound care MD instructed pt come to ER for admit for failing outpt antibiotic therapy with clindamycin. Completed PO antibiotics. Denies fever, weakness. Unable to visualize wound        HPI: Mr. Gonzalez is a 67-year-old male with a past medical history of type 2 diabetes and chronic venous insufficiency presented to the ED at [facility] at the recommendation of his wound care provider for wound infection/cellulitis.  To venous stasis ulcers on his left lower extremity been present for about 3 weeks.  He was first seen in Wound Care Clinic on 05/26 and prescribed clindamycin for suspected cellulitis surrounding the left lower extremity wounds.  He presented to Wound Care Clinic on yesterday 6/2 follow-up and was noted to have increased erythema, pain, warmth, and new foul-smelling drainage.  He was instructed to present to the ED, but he had some things to do so waited to come to the ED until today.  Other than his wounds, he is in his usual state of health and has no complaints.  He denies fever, chills, chest pain, shortness of breath, cough.  He notes his left lower extremity is always a little more swollen than the right and he wears compression stockings regularly.    In the ED, he was given vanc and Zosyn.  He is being admitted to Hospital Medicine for IV antibiotics since he failed outpatient treatment.      Past Medical History:   Diagnosis Date    Diabetes mellitus     Gout attack      Hypertension        Past Surgical History:   Procedure Laterality Date    BACK SURGERY      lumbar fusion      X 2       Review of patient's allergies indicates:  No Known Allergies    No current facility-administered medications on file prior to encounter.     Current Outpatient Medications on File Prior to Encounter   Medication Sig    allopurinoL (ZYLOPRIM) 300 MG tablet Take 1 tablet (300 mg total) by mouth once daily.    amLODIPine (NORVASC) 10 MG tablet Take 10 mg by mouth once daily.    anastrozole (ARIMIDEX) 1 mg Tab Take 1 mg by mouth once daily.    aspirin (ECOTRIN) 81 MG EC tablet Take 81 mg by mouth once daily.    atorvastatin (LIPITOR) 40 MG tablet Take 1 tablet (40 mg total) by mouth every evening. For cholesterol    clotrimazole-betamethasone 1-0.05% (LOTRISONE) cream Apply topically once daily. To feet and toenails    finasteride (PROSCAR) 5 mg tablet TK 1 T PO QD    furosemide (LASIX) 40 MG tablet Take 1 tablet (40 mg total) by mouth 2 (two) times a day.    hydrocodone-acetaminophen 5-325mg (NORCO) 5-325 mg per tablet TK 1 T PO  Q 6 TO 8 H    ibuprofen (ADVIL,MOTRIN) 600 MG tablet TAKE 1 TABLET(600 MG) BY MOUTH EVERY 6 HOURS WITH FOOD AS NEEDED FOR PAIN OR INFLAMMATION    metFORMIN (GLUCOPHAGE-XR) 500 MG ER 24hr tablet Take 1 tablet (500 mg total) by mouth once daily.    olmesartan (BENICAR) 40 MG tablet Take 1 tablet (40 mg total) by mouth once daily. Blood pressure    pregabalin (LYRICA) 75 MG capsule TAKE 1 CAPSULE(75 MG) BY MOUTH EVERY DAY (Patient taking differently: 3 (three) times daily.)    semaglutide (OZEMPIC) 0.25 mg or 0.5 mg(2 mg/1.5 mL) pen injector Inject 0.5 mg into the skin every 7 days.    tamsulosin (FLOMAX) 0.4 mg Cp24 TK 1 C PO BID    naproxen (NAPROSYN) 500 MG tablet Take 1 tablet (500 mg total) by mouth every 12 (twelve) hours as needed (pain). Take with food.    [DISCONTINUED] clindamycin (CLEOCIN) 300 MG capsule Take 1 capsule (300 mg total) by mouth every  8 (eight) hours. for 7 days     Family History       Problem Relation (Age of Onset)    Diabetes Mother, Sister, Father, Sister    Heart disease Mother, Father, Brother, Sister    Stroke Sister    Thyroid disease Mother          Tobacco Use    Smoking status: Never Smoker    Smokeless tobacco: Never Used   Substance and Sexual Activity    Alcohol use: Yes     Comment: occ    Drug use: No    Sexual activity: Not on file     Review of Systems   Constitutional:  Negative for activity change, appetite change, chills, diaphoresis, fatigue and fever.   HENT:  Negative for congestion, dental problem, ear pain, rhinorrhea, sore throat, trouble swallowing and voice change.    Eyes:  Negative for visual disturbance.   Respiratory:  Negative for cough, chest tightness, shortness of breath and wheezing.    Cardiovascular:  Negative for chest pain, palpitations and leg swelling.   Gastrointestinal:  Negative for abdominal distention, abdominal pain, blood in stool, constipation, diarrhea, nausea and vomiting.   Genitourinary:  Negative for difficulty urinating, dysuria, flank pain, frequency, hematuria and urgency.   Musculoskeletal:  Positive for arthralgias (chronic arthritis). Negative for back pain, gait problem, joint swelling and neck pain.   Skin:  Positive for color change and wound. Negative for rash.   Neurological:  Negative for dizziness, syncope, speech difficulty, weakness, light-headedness, numbness and headaches.   Hematological:  Does not bruise/bleed easily.   Psychiatric/Behavioral:  Negative for confusion.    Objective:     Vital Signs (Most Recent):  Temp: 97.6 °F (36.4 °C) (06/03/22 0720)  Pulse: 87 (06/03/22 0840)  Resp: 17 (06/03/22 0910)  BP: (!) 149/76 (06/03/22 0840)  SpO2: 96 % (06/03/22 0840)   Vital Signs (24h Range):  Temp:  [97.6 °F (36.4 °C)-98.2 °F (36.8 °C)] 97.6 °F (36.4 °C)  Pulse:  [86-95] 87  Resp:  [17-30] 17  SpO2:  [96 %] 96 %  BP: (139-158)/(68-92) 149/76     Weight: (!) 148.3 kg  (327 lb)  Body mass index is 51.22 kg/m².    Physical Exam  Vitals and nursing note reviewed.   Constitutional:       General: He is not in acute distress.     Appearance: Normal appearance. He is obese. He is not ill-appearing.   HENT:      Head: Normocephalic and atraumatic.      Mouth/Throat:      Mouth: Mucous membranes are moist.      Pharynx: Oropharynx is clear.   Eyes:      Extraocular Movements: Extraocular movements intact.      Conjunctiva/sclera: Conjunctivae normal.      Pupils: Pupils are equal, round, and reactive to light.   Cardiovascular:      Rate and Rhythm: Normal rate and regular rhythm.      Pulses: Normal pulses.      Heart sounds: Normal heart sounds. No murmur heard.  Pulmonary:      Effort: Pulmonary effort is normal. No respiratory distress.      Breath sounds: Normal breath sounds. No wheezing or rales.   Abdominal:      General: Bowel sounds are normal. There is no distension.      Palpations: Abdomen is soft.      Tenderness: There is no abdominal tenderness.   Musculoskeletal:         General: Normal range of motion.      Cervical back: Normal range of motion and neck supple.      Right lower leg: Edema present.      Left lower leg: Edema present.   Skin:     General: Skin is warm and dry.      Findings: Erythema present.      Comments: Venous stasis wound to anterior LEFT leg and another to posterior LEFT leg.  Surrounding erythema, warmth, and tenderness extending past edges marked with surgical marker at outpatient wound clinic.   Neurological:      General: No focal deficit present.      Mental Status: He is alert and oriented to person, place, and time. Mental status is at baseline.   Psychiatric:         Behavior: Behavior normal.         Thought Content: Thought content normal.         CRANIAL NERVES     CN III, IV, VI   Pupils are equal, round, and reactive to light.     Significant Labs: All pertinent labs within the past 24 hours have been reviewed.  CBC:   Recent Labs   Lab  06/03/22  0748   WBC 8.54   HGB 13.9*   HCT 42.8        CMP:   Recent Labs   Lab 06/03/22  0748      K 4.1      CO2 22*   *   BUN 16   CREATININE 0.8   CALCIUM 9.2   PROT 7.4   ALBUMIN 3.5   BILITOT 0.5   ALKPHOS 94   AST 18   ALT 14   ANIONGAP 11   EGFRNONAA >60     Lactic Acid:   Recent Labs   Lab 06/03/22  0748   LACTATE 1.1       Significant Imaging: I have reviewed all pertinent imaging results/findings within the past 24 hours.    Assessment/Plan:     Cellulitis of left lower leg  Venous stasis ulcer of left lower leg  - follows with outpatient wound clinic and failed oral clindamycin  - wound cultures taken in clinic on 6/2, aerobic culture growing moderate GNR; gram stain also with GNRs but no gram positive organisms  - received vanc and zosyn in ED, but as no gram positive organisms are growing from cultures and infection is nonpurulent, will d/c vanc and continue zosyn.  - f/u cultures  - pain control  - wound care  - check ABIs to guide compression therapy    Essential hypertension  - continue home amlodipine, olmesartan (interchange losartan)    Type 2 diabetes mellitus  Diabetic neuropathy  - hold home metformin, Ozempic, start low-dose sliding scale insulin  - continue home Lyrica    BPH  Male hypogonadism  - continue home tamsulosin, finasteride, anastrozole    Atelectasis  - noted on chest x-ray, no respiratory symptoms, start incentive spirometry      VTE Risk Mitigation (From admission, onward)         Ordered     enoxaparin injection 40 mg  Every 12 hours         06/03/22 1157     IP VTE HIGH RISK PATIENT  Once         06/03/22 1157     Place sequential compression device  Until discontinued         06/03/22 1157                   Izzy Ricci PA-C  Department of Hospital Medicine

## 2022-06-03 NOTE — SUBJECTIVE & OBJECTIVE
Past Medical History:   Diagnosis Date    Diabetes mellitus     Gout attack     Hypertension        Past Surgical History:   Procedure Laterality Date    BACK SURGERY      lumbar fusion      X 2       Review of patient's allergies indicates:  No Known Allergies    No current facility-administered medications on file prior to encounter.     Current Outpatient Medications on File Prior to Encounter   Medication Sig    allopurinoL (ZYLOPRIM) 300 MG tablet Take 1 tablet (300 mg total) by mouth once daily.    amLODIPine (NORVASC) 10 MG tablet Take 10 mg by mouth once daily.    anastrozole (ARIMIDEX) 1 mg Tab Take 1 mg by mouth once daily.    aspirin (ECOTRIN) 81 MG EC tablet Take 81 mg by mouth once daily.    atorvastatin (LIPITOR) 40 MG tablet Take 1 tablet (40 mg total) by mouth every evening. For cholesterol    clotrimazole-betamethasone 1-0.05% (LOTRISONE) cream Apply topically once daily. To feet and toenails    finasteride (PROSCAR) 5 mg tablet TK 1 T PO QD    furosemide (LASIX) 40 MG tablet Take 1 tablet (40 mg total) by mouth 2 (two) times a day.    hydrocodone-acetaminophen 5-325mg (NORCO) 5-325 mg per tablet TK 1 T PO  Q 6 TO 8 H    ibuprofen (ADVIL,MOTRIN) 600 MG tablet TAKE 1 TABLET(600 MG) BY MOUTH EVERY 6 HOURS WITH FOOD AS NEEDED FOR PAIN OR INFLAMMATION    metFORMIN (GLUCOPHAGE-XR) 500 MG ER 24hr tablet Take 1 tablet (500 mg total) by mouth once daily.    olmesartan (BENICAR) 40 MG tablet Take 1 tablet (40 mg total) by mouth once daily. Blood pressure    pregabalin (LYRICA) 75 MG capsule TAKE 1 CAPSULE(75 MG) BY MOUTH EVERY DAY (Patient taking differently: 3 (three) times daily.)    semaglutide (OZEMPIC) 0.25 mg or 0.5 mg(2 mg/1.5 mL) pen injector Inject 0.5 mg into the skin every 7 days.    tamsulosin (FLOMAX) 0.4 mg Cp24 TK 1 C PO BID    naproxen (NAPROSYN) 500 MG tablet Take 1 tablet (500 mg total) by mouth every 12 (twelve) hours as needed (pain). Take with food.    [DISCONTINUED] clindamycin  (CLEOCIN) 300 MG capsule Take 1 capsule (300 mg total) by mouth every 8 (eight) hours. for 7 days     Family History       Problem Relation (Age of Onset)    Diabetes Mother, Sister, Father, Sister    Heart disease Mother, Father, Brother, Sister    Stroke Sister    Thyroid disease Mother          Tobacco Use    Smoking status: Never Smoker    Smokeless tobacco: Never Used   Substance and Sexual Activity    Alcohol use: Yes     Comment: occ    Drug use: No    Sexual activity: Not on file     Review of Systems   Constitutional:  Negative for activity change, appetite change, chills, diaphoresis, fatigue and fever.   HENT:  Negative for congestion, dental problem, ear pain, rhinorrhea, sore throat, trouble swallowing and voice change.    Eyes:  Negative for visual disturbance.   Respiratory:  Negative for cough, chest tightness, shortness of breath and wheezing.    Cardiovascular:  Negative for chest pain, palpitations and leg swelling.   Gastrointestinal:  Negative for abdominal distention, abdominal pain, blood in stool, constipation, diarrhea, nausea and vomiting.   Genitourinary:  Negative for difficulty urinating, dysuria, flank pain, frequency, hematuria and urgency.   Musculoskeletal:  Positive for arthralgias (chronic arthritis). Negative for back pain, gait problem, joint swelling and neck pain.   Skin:  Positive for color change and wound. Negative for rash.   Neurological:  Negative for dizziness, syncope, speech difficulty, weakness, light-headedness, numbness and headaches.   Hematological:  Does not bruise/bleed easily.   Psychiatric/Behavioral:  Negative for confusion.    Objective:     Vital Signs (Most Recent):  Temp: 97.6 °F (36.4 °C) (06/03/22 0720)  Pulse: 87 (06/03/22 0840)  Resp: 17 (06/03/22 0910)  BP: (!) 149/76 (06/03/22 0840)  SpO2: 96 % (06/03/22 0840)   Vital Signs (24h Range):  Temp:  [97.6 °F (36.4 °C)-98.2 °F (36.8 °C)] 97.6 °F (36.4 °C)  Pulse:  [86-95] 87  Resp:  [17-30] 17  SpO2:  [96  %] 96 %  BP: (139-158)/(68-92) 149/76     Weight: (!) 148.3 kg (327 lb)  Body mass index is 51.22 kg/m².    Physical Exam  Vitals and nursing note reviewed.   Constitutional:       General: He is not in acute distress.     Appearance: Normal appearance. He is obese. He is not ill-appearing.   HENT:      Head: Normocephalic and atraumatic.      Mouth/Throat:      Mouth: Mucous membranes are moist.      Pharynx: Oropharynx is clear.   Eyes:      Extraocular Movements: Extraocular movements intact.      Conjunctiva/sclera: Conjunctivae normal.      Pupils: Pupils are equal, round, and reactive to light.   Cardiovascular:      Rate and Rhythm: Normal rate and regular rhythm.      Pulses: Normal pulses.      Heart sounds: Normal heart sounds. No murmur heard.  Pulmonary:      Effort: Pulmonary effort is normal. No respiratory distress.      Breath sounds: Normal breath sounds. No wheezing or rales.   Abdominal:      General: Bowel sounds are normal. There is no distension.      Palpations: Abdomen is soft.      Tenderness: There is no abdominal tenderness.   Musculoskeletal:         General: Normal range of motion.      Cervical back: Normal range of motion and neck supple.      Right lower leg: Edema present.      Left lower leg: Edema present.   Skin:     General: Skin is warm and dry.      Findings: Erythema present.      Comments: Venous stasis wound to anterior LEFT leg and another to posterior LEFT leg.  Surrounding erythema, warmth, and tenderness extending past edges marked with surgical marker at outpatient wound clinic.   Neurological:      General: No focal deficit present.      Mental Status: He is alert and oriented to person, place, and time. Mental status is at baseline.   Psychiatric:         Behavior: Behavior normal.         Thought Content: Thought content normal.         CRANIAL NERVES     CN III, IV, VI   Pupils are equal, round, and reactive to light.     Significant Labs: All pertinent labs within  the past 24 hours have been reviewed.  CBC:   Recent Labs   Lab 06/03/22  0748   WBC 8.54   HGB 13.9*   HCT 42.8        CMP:   Recent Labs   Lab 06/03/22  0748      K 4.1      CO2 22*   *   BUN 16   CREATININE 0.8   CALCIUM 9.2   PROT 7.4   ALBUMIN 3.5   BILITOT 0.5   ALKPHOS 94   AST 18   ALT 14   ANIONGAP 11   EGFRNONAA >60     Lactic Acid:   Recent Labs   Lab 06/03/22  0748   LACTATE 1.1       Significant Imaging: I have reviewed all pertinent imaging results/findings within the past 24 hours.

## 2022-06-03 NOTE — PROGRESS NOTES
[facility] ([place])  Wound Care    Patient Name:  Jono Gonzalez   MRN:  [id number]  Date: 6/3/2022  Diagnosis: Wound infection    History:     Past Medical History:   Diagnosis Date    Diabetes mellitus     Gout attack     Hypertension        Social History     Socioeconomic History    Marital status:    Tobacco Use    Smoking status: Never Smoker    Smokeless tobacco: Never Used   Substance and Sexual Activity    Alcohol use: Yes     Comment: occ    Drug use: No       Precautions:     Allergies as of 06/03/2022    (No Known Allergies)     67-year-old male with a past medical history of type 2 diabetes and chronic venous insufficiency presented to the ED at [facility] at the recommendation of his wound care provider for wound infection/cellulitis.  To venous stasis ulcers on his left lower extremity been present for about 3 weeks.  He was first seen in Wound Care Clinic on 05/26 and prescribed clindamycin for suspected cellulitis surrounding the left lower extremity wounds.  He presented to Wound Care Clinic on yesterday 6/2 follow-up and was noted to have increased erythema, pain, warmth, and new foul-smelling drainage.In the ED, he was given vanc and Zosyn.  He is being admitted to Hospital Medicine for IV antibiotics since he failed outpatient treatment.    [facility] Assessment Details/Treatment   Wound care consulted for venous stasis ulcerations to left lower extremity  Left anterior lower leg ulcer 5x4.4x0.2cm 80 % red granulation, 20 yellow fibrin, hemosiderin staining noted, odor and creamy yellow exudate appreciated      Left posterior lower leg ulcer 5.5x4.5x0.1cm red shiny base with fibrin, odor and yellow exudate appreciated.      Transporting to ultrasound for ARIANA study to determine what level of  therapeutic compression can be utilized .  Nursing staff has orders for dressing change with Vashe wound cleanser, Aquacel AG and mepilex foam topical dressing to the wound.    06/03/2022

## 2022-06-03 NOTE — PLAN OF CARE
Plan of care reviewed with pt.  Dressing changed per verbal orders from Kwan (wound care nurse).  Pt independent with ADL's, and able to make needs known.  Pain managed with prn medications.  Purposeful rounding completed, call bell and personal items within reach.  Pt denies needs at this time.

## 2022-06-03 NOTE — HPI
Mr. Gonzalez is a 67-year-old male with a past medical history of type 2 diabetes and chronic venous insufficiency presented to the ED at [facility] at the recommendation of his wound care provider for wound infection/cellulitis.  To venous stasis ulcers on his left lower extremity been present for about 3 weeks.  He was first seen in Wound Care Clinic on 05/26 and prescribed clindamycin for suspected cellulitis surrounding the left lower extremity wounds.  He presented to Wound Care Clinic on yesterday 6/2 follow-up and was noted to have increased erythema, pain, warmth, and new foul-smelling drainage.  He was instructed to present to the ED, but he had some things to do so waited to come to the ED until today.  Other than his wounds, he is in his usual state of health and has no complaints.  He denies fever, chills, chest pain, shortness of breath, cough.  He notes his left lower extremity is always a little more swollen than the right and he wears compression stockings regularly.    In the ED, he was given vanc and Zosyn.  He is being admitted to Hospital Medicine for IV antibiotics since he failed outpatient treatment.

## 2022-06-04 VITALS
RESPIRATION RATE: 20 BRPM | BODY MASS INDEX: 49.44 KG/M2 | DIASTOLIC BLOOD PRESSURE: 54 MMHG | HEART RATE: 91 BPM | SYSTOLIC BLOOD PRESSURE: 104 MMHG | HEIGHT: 67 IN | TEMPERATURE: 98 F | WEIGHT: 315 LBS | OXYGEN SATURATION: 91 %

## 2022-06-04 LAB
ANION GAP SERPL CALC-SCNC: 10 MMOL/L (ref 8–16)
BUN SERPL-MCNC: 15 MG/DL (ref 8–23)
CALCIUM SERPL-MCNC: 9.5 MG/DL (ref 8.7–10.5)
CHLORIDE SERPL-SCNC: 105 MMOL/L (ref 95–110)
CO2 SERPL-SCNC: 26 MMOL/L (ref 23–29)
CREAT SERPL-MCNC: 0.9 MG/DL (ref 0.5–1.4)
EST. GFR  (AFRICAN AMERICAN): >60 ML/MIN/1.73 M^2
EST. GFR  (NON AFRICAN AMERICAN): >60 ML/MIN/1.73 M^2
GLUCOSE SERPL-MCNC: 114 MG/DL (ref 70–110)
POCT GLUCOSE: 126 MG/DL (ref 70–110)
POCT GLUCOSE: 133 MG/DL (ref 70–110)
POTASSIUM SERPL-SCNC: 4.6 MMOL/L (ref 3.5–5.1)
SODIUM SERPL-SCNC: 141 MMOL/L (ref 136–145)

## 2022-06-04 PROCEDURE — 36415 COLL VENOUS BLD VENIPUNCTURE: CPT | Performed by: PHYSICIAN ASSISTANT

## 2022-06-04 PROCEDURE — 96372 THER/PROPH/DIAG INJ SC/IM: CPT | Performed by: PHYSICIAN ASSISTANT

## 2022-06-04 PROCEDURE — G0378 HOSPITAL OBSERVATION PER HR: HCPCS

## 2022-06-04 PROCEDURE — 80048 BASIC METABOLIC PNL TOTAL CA: CPT | Performed by: PHYSICIAN ASSISTANT

## 2022-06-04 PROCEDURE — 25000003 PHARM REV CODE 250: Performed by: PHYSICIAN ASSISTANT

## 2022-06-04 PROCEDURE — 94761 N-INVAS EAR/PLS OXIMETRY MLT: CPT

## 2022-06-04 PROCEDURE — 99217 PR OBSERVATION CARE DISCHARGE: ICD-10-PCS | Mod: ,,, | Performed by: PHYSICIAN ASSISTANT

## 2022-06-04 PROCEDURE — 94660 CPAP INITIATION&MGMT: CPT

## 2022-06-04 PROCEDURE — 99217 PR OBSERVATION CARE DISCHARGE: CPT | Mod: ,,, | Performed by: PHYSICIAN ASSISTANT

## 2022-06-04 PROCEDURE — 99900035 HC TECH TIME PER 15 MIN (STAT)

## 2022-06-04 PROCEDURE — 96366 THER/PROPH/DIAG IV INF ADDON: CPT

## 2022-06-04 PROCEDURE — 63600175 PHARM REV CODE 636 W HCPCS: Performed by: PHYSICIAN ASSISTANT

## 2022-06-04 RX ORDER — SULFAMETHOXAZOLE AND TRIMETHOPRIM 800; 160 MG/1; MG/1
2 TABLET ORAL 2 TIMES DAILY
Qty: 28 TABLET | Refills: 0 | Status: SHIPPED | OUTPATIENT
Start: 2022-06-04 | End: 2022-06-20 | Stop reason: SDUPTHER

## 2022-06-04 RX ADMIN — FINASTERIDE 5 MG: 5 TABLET, FILM COATED ORAL at 08:06

## 2022-06-04 RX ADMIN — ALLOPURINOL 300 MG: 300 TABLET ORAL at 08:06

## 2022-06-04 RX ADMIN — PIPERACILLIN AND TAZOBACTAM 4.5 G: 4; .5 INJECTION, POWDER, LYOPHILIZED, FOR SOLUTION INTRAVENOUS; PARENTERAL at 08:06

## 2022-06-04 RX ADMIN — ENOXAPARIN SODIUM 40 MG: 40 INJECTION SUBCUTANEOUS at 08:06

## 2022-06-04 RX ADMIN — ANASTROZOLE 1 MG: 1 TABLET, COATED ORAL at 08:06

## 2022-06-04 RX ADMIN — FUROSEMIDE 40 MG: 40 TABLET ORAL at 08:06

## 2022-06-04 RX ADMIN — PREGABALIN 75 MG: 75 CAPSULE ORAL at 08:06

## 2022-06-04 RX ADMIN — AMLODIPINE BESYLATE 10 MG: 5 TABLET ORAL at 08:06

## 2022-06-04 RX ADMIN — HYDROCODONE BITARTRATE AND ACETAMINOPHEN 1 TABLET: 10; 325 TABLET ORAL at 12:06

## 2022-06-04 RX ADMIN — PIPERACILLIN AND TAZOBACTAM 4.5 G: 4; .5 INJECTION, POWDER, LYOPHILIZED, FOR SOLUTION INTRAVENOUS; PARENTERAL at 12:06

## 2022-06-04 RX ADMIN — ASPIRIN 81 MG: 81 TABLET, COATED ORAL at 08:06

## 2022-06-04 RX ADMIN — TAMSULOSIN HYDROCHLORIDE 0.4 MG: 0.4 CAPSULE ORAL at 08:06

## 2022-06-04 RX ADMIN — LOSARTAN POTASSIUM 100 MG: 50 TABLET, FILM COATED ORAL at 08:06

## 2022-06-04 NOTE — PLAN OF CARE
06/04/22 1345   Final Note   Assessment Type Final Discharge Note   Anticipated Discharge Disposition Home   Post-Acute Status   Discharge Delays None known at this time     No needs.

## 2022-06-04 NOTE — PLAN OF CARE
06/04/22 0934   Discharge Assessment   Assessment Type Discharge Planning Assessment   Confirmed/corrected address, phone number and insurance Yes   Confirmed Demographics Correct on Facesheet   Source of Information patient   Does patient/caregiver understand observation status Yes   Communicated MONAE with patient/caregiver Yes;Date not available/Unable to determine   Reason For Admission Wound Infection   Lives With spouse   Do you expect to return to your current living situation? Yes   Do you have help at home or someone to help you manage your care at home? Yes   Who are your caregiver(s) and their phone number(s)? Spouse   Prior to hospitilization cognitive status: Alert/Oriented   Current cognitive status: Alert/Oriented   Walking or Climbing Stairs Difficulty ambulation difficulty, requires equipment   Mobility Management Cane   Dressing/Bathing Difficulty none   Equipment Currently Used at Home cane, straight   Readmission within 30 days? No   Patient currently being followed by outpatient case management? No   Do you take prescription medications? Yes   Who is going to help you get home at discharge? Spouse   Are you on dialysis? No   Do you take coumadin? Yes   Discharge Plan A Home with family   Discharge Barriers Identified None

## 2022-06-04 NOTE — NURSING
AVS reviewed with pt.  New medications and follow up discussed.  Pt denies questions at this time.  IV access removed.  Family will provide transport home.

## 2022-06-04 NOTE — DISCHARGE SUMMARY
[facility]  Discharge Summary      Patient Name: Jono Gonzalez  MRN: [id number]  Patient Class: OP- Observation  Admission Date: 6/3/2022  Hospital Length of Stay: 0 days  Discharge Date and Time:  06/04/2022 11:48 AM  Attending Physician: Arabella Davis MD   Discharging Provider: Izzy Ricci PA-C  Primary Care Provider: Jim Linn MD      HPI:   Mr. Gonzalez is a 67-year-old male with a past medical history of type 2 diabetes and chronic venous insufficiency presented to the ED at [facility] at the recommendation of his wound care provider for wound infection/cellulitis.  To venous stasis ulcers on his left lower extremity been present for about 3 weeks.  He was first seen in Wound Care Clinic on 05/26 and prescribed clindamycin for suspected cellulitis surrounding the left lower extremity wounds.  He presented to Wound Care Clinic on yesterday 6/2 follow-up and was noted to have increased erythema, pain, warmth, and new foul-smelling drainage.  He was instructed to present to the ED, but he had some things to do so waited to come to the ED until today.  Other than his wounds, he is in his usual state of health and has no complaints.  He denies fever, chills, chest pain, shortness of breath, cough.  He notes his left lower extremity is always a little more swollen than the right and he wears compression stockings regularly.    In the ED, he was given vanc and Zosyn.  He is being admitted to Hospital Medicine for IV antibiotics since he failed outpatient treatment.            Hospital Course:     Cellulitis of left lower leg  Venous stasis ulcer of left lower leg  - follows with outpatient wound clinic and failed oral clindamycin.  Received zosyn while admitted.  Aerobic wound cultures taken in clinic on 6/2/22 grew moderate E. coli and K. oxytoca both sensitive to cipro and bactrim.  Borderline QTc, kidney function good, so discharged on Bactrim 2 DS tabs BID x7 days  -  f/u with wound care clinic (patient to call to schedule appointment asap) and PCP (appointment scheduled for next week.  - of note, ARIANA with no evidence for hemodynamically significant arterial stenosis.     Essential hypertension  - continue home amlodipine, olmesartan      Type 2 diabetes mellitus  Diabetic neuropathy  -continue home metformin, Ozempic, Lyrica     BPH  Male hypogonadism  - continue home tamsulosin, finasteride, anastrozole        Patient seen and examined by me on discharge day.  Questions were sought and answered to patient's satisfaction.  Patient aware and in agreement with discharge plan.    Goals of Care Treatment Preferences:  Code Status: Full Code      Consults: none    Final Active Diagnoses:    Diagnosis Date Noted POA    PRINCIPAL PROBLEM:  Wound infection [T14.8XXA, L08.9] 06/03/2022 Yes    Cellulitis of left lower extremity [L03.116] 06/03/2022 Yes    Venous stasis ulcer of left lower extremity [I83.029, L97.929] 05/17/2022 Yes    Morbid obesity [E66.01] 05/17/2022 Yes    Lymphedema of both lower extremities [I89.0] 05/24/2021 Yes    Hypertension [I10] 10/11/2018 Yes    DAVID (obstructive sleep apnea) [G47.33] 10/11/2018 Yes    Type 2 diabetes mellitus without complication, without long-term current use of insulin [E11.9] 10/11/2018 Yes      Problems Resolved During this Admission:       Discharged Condition: good    Disposition: Home or Self Care    Follow Up:   Follow-up Information     [facility]. Schedule an appointment as soon as possible for a visit.    Specialty: Wound Care  Why: Hospital follow-up  Contact information:  [address]  [facility] [id number] [phone]           Jim Linn MD Follow up.    Specialty: Family Medicine  Why: follow up as scheduled.  Contact information:  [address]  [facility] [address] [phone]                       Patient Instructions:      Diet Cardiac     Notify your health  care provider if you experience any of the following:  temperature >100.4     Activity as tolerated       Significant Diagnostic Studies: Labs:   CMP   Recent Labs   Lab 06/03/22  0748 06/04/22  0500    141   K 4.1 4.6    105   CO2 22* 26   * 114*   BUN 16 15   CREATININE 0.8 0.9   CALCIUM 9.2 9.5   PROT 7.4  --    ALBUMIN 3.5  --    BILITOT 0.5  --    ALKPHOS 94  --    AST 18  --    ALT 14  --    ANIONGAP 11 10   ESTGFRAFRICA >60 >60   EGFRNONAA >60 >60   , CBC   Recent Labs   Lab 06/03/22  0748   WBC 8.54   HGB 13.9*   HCT 42.8       and All labs within the past 24 hours have been reviewed    Pending Diagnostic Studies:     None         Medications:  Reconciled Home Medications:      Medication List      START taking these medications    sulfamethoxazole-trimethoprim 800-160mg 800-160 mg Tab  Commonly known as: BACTRIM DS  Take 2 tablets by mouth 2 (two) times daily. for 7 days        CHANGE how you take these medications    pregabalin 75 MG capsule  Commonly known as: LYRICA  TAKE 1 CAPSULE(75 MG) BY MOUTH EVERY DAY  What changed: See the new instructions.        CONTINUE taking these medications    allopurinoL 300 MG tablet  Commonly known as: ZYLOPRIM  Take 1 tablet (300 mg total) by mouth once daily.     amLODIPine 10 MG tablet  Commonly known as: NORVASC  Take 10 mg by mouth once daily.     anastrozole 1 mg Tab  Commonly known as: ARIMIDEX  Take 1 mg by mouth once daily.     aspirin 81 MG EC tablet  Commonly known as: ECOTRIN  Take 81 mg by mouth once daily.     atorvastatin 40 MG tablet  Commonly known as: LIPITOR  Take 1 tablet (40 mg total) by mouth every evening. For cholesterol     clotrimazole-betamethasone 1-0.05% cream  Commonly known as: LOTRISONE  Apply topically once daily. To feet and toenails     finasteride 5 mg tablet  Commonly known as: PROSCAR  TK 1 T PO QD     furosemide 40 MG tablet  Commonly known as: LASIX  Take 1 tablet (40 mg total) by mouth 2 (two) times a  day.     HYDROcodone-acetaminophen 5-325 mg per tablet  Commonly known as: NORCO  TK 1 T PO  Q 6 TO 8 H     ibuprofen 600 MG tablet  Commonly known as: ADVIL,MOTRIN  TAKE 1 TABLET(600 MG) BY MOUTH EVERY 6 HOURS WITH FOOD AS NEEDED FOR PAIN OR INFLAMMATION     metFORMIN 500 MG ER 24hr tablet  Commonly known as: GLUCOPHAGE-XR  Take 1 tablet (500 mg total) by mouth once daily.     olmesartan 40 MG tablet  Commonly known as: BENICAR  Take 1 tablet (40 mg total) by mouth once daily. Blood pressure     semaglutide 0.25 mg or 0.5 mg(2 mg/1.5 mL) pen injector  Commonly known as: OZEMPIC  Inject 0.5 mg into the skin every 7 days.     tamsulosin 0.4 mg Cap  Commonly known as: FLOMAX  TK 1 C PO BID        STOP taking these medications    naproxen 500 MG tablet  Commonly known as: NAPROSYN            Indwelling Lines/Drains at time of discharge:   Lines/Drains/Airways     None                 Time spent on the discharge of patient: 40 minutes         Izzy Ricci PA-C  Department of Hospital Medicine  [facility] ([place])

## 2022-06-06 ENCOUNTER — OFFICE VISIT (OUTPATIENT)
Dept: WOUND CARE | Facility: CLINIC | Age: 67
End: 2022-06-06
Payer: COMMERCIAL

## 2022-06-06 VITALS
DIASTOLIC BLOOD PRESSURE: 56 MMHG | BODY MASS INDEX: 49.13 KG/M2 | TEMPERATURE: 99 F | SYSTOLIC BLOOD PRESSURE: 127 MMHG | HEIGHT: 67 IN | HEART RATE: 107 BPM | WEIGHT: 313.06 LBS

## 2022-06-06 DIAGNOSIS — L97.929 VENOUS STASIS ULCER OF LEFT LOWER EXTREMITY: Primary | ICD-10-CM

## 2022-06-06 DIAGNOSIS — I89.0 LYMPHEDEMA OF BOTH LOWER EXTREMITIES: ICD-10-CM

## 2022-06-06 DIAGNOSIS — L08.9 WOUND INFECTION: ICD-10-CM

## 2022-06-06 DIAGNOSIS — L03.116 CELLULITIS OF LEFT LEG: ICD-10-CM

## 2022-06-06 DIAGNOSIS — I83.029 VENOUS STASIS ULCER OF LEFT LOWER EXTREMITY: Primary | ICD-10-CM

## 2022-06-06 DIAGNOSIS — T14.8XXA WOUND INFECTION: ICD-10-CM

## 2022-06-06 LAB
BACTERIA SPEC AEROBE CULT: ABNORMAL
BACTERIA SPEC ANAEROBE CULT: ABNORMAL

## 2022-06-06 PROCEDURE — 3078F PR MOST RECENT DIASTOLIC BLOOD PRESSURE < 80 MM HG: ICD-10-PCS | Mod: CPTII,S$GLB,,

## 2022-06-06 PROCEDURE — 3008F PR BODY MASS INDEX (BMI) DOCUMENTED: ICD-10-PCS | Mod: CPTII,S$GLB,,

## 2022-06-06 PROCEDURE — 3051F HG A1C>EQUAL 7.0%<8.0%: CPT | Mod: CPTII,S$GLB,,

## 2022-06-06 PROCEDURE — 4010F ACE/ARB THERAPY RXD/TAKEN: CPT | Mod: CPTII,S$GLB,,

## 2022-06-06 PROCEDURE — 1101F PT FALLS ASSESS-DOCD LE1/YR: CPT | Mod: CPTII,S$GLB,,

## 2022-06-06 PROCEDURE — 99999 PR PBB SHADOW E&M-EST. PATIENT-LVL IV: ICD-10-PCS | Mod: PBBFAC,,,

## 2022-06-06 PROCEDURE — 97598 DEBRIDEMENT: ICD-10-PCS | Mod: S$GLB,,,

## 2022-06-06 PROCEDURE — 97598 DBRDMT OPN WND ADDL 20CM/<: CPT | Mod: S$GLB,,,

## 2022-06-06 PROCEDURE — 1159F MED LIST DOCD IN RCRD: CPT | Mod: CPTII,S$GLB,,

## 2022-06-06 PROCEDURE — 97597 DBRDMT OPN WND 1ST 20 CM/<: CPT | Mod: S$GLB,,,

## 2022-06-06 PROCEDURE — 4010F PR ACE/ARB THEARPY RXD/TAKEN: ICD-10-PCS | Mod: CPTII,S$GLB,,

## 2022-06-06 PROCEDURE — 3061F NEG MICROALBUMINURIA REV: CPT | Mod: CPTII,S$GLB,,

## 2022-06-06 PROCEDURE — 99999 PR PBB SHADOW E&M-EST. PATIENT-LVL IV: CPT | Mod: PBBFAC,,,

## 2022-06-06 PROCEDURE — 3078F DIAST BP <80 MM HG: CPT | Mod: CPTII,S$GLB,,

## 2022-06-06 PROCEDURE — 3061F PR NEG MICROALBUMINURIA RESULT DOCUMENTED/REVIEW: ICD-10-PCS | Mod: CPTII,S$GLB,,

## 2022-06-06 PROCEDURE — 3008F BODY MASS INDEX DOCD: CPT | Mod: CPTII,S$GLB,,

## 2022-06-06 PROCEDURE — 3288F FALL RISK ASSESSMENT DOCD: CPT | Mod: CPTII,S$GLB,,

## 2022-06-06 PROCEDURE — 3288F PR FALLS RISK ASSESSMENT DOCUMENTED: ICD-10-PCS | Mod: CPTII,S$GLB,,

## 2022-06-06 PROCEDURE — 3051F PR MOST RECENT HEMOGLOBIN A1C LEVEL 7.0 - < 8.0%: ICD-10-PCS | Mod: CPTII,S$GLB,,

## 2022-06-06 PROCEDURE — 3074F SYST BP LT 130 MM HG: CPT | Mod: CPTII,S$GLB,,

## 2022-06-06 PROCEDURE — 3072F PR LOW RISK FOR RETINOPATHY: ICD-10-PCS | Mod: CPTII,S$GLB,,

## 2022-06-06 PROCEDURE — 3066F PR DOCUMENTATION OF TREATMENT FOR NEPHROPATHY: ICD-10-PCS | Mod: CPTII,S$GLB,,

## 2022-06-06 PROCEDURE — 1159F PR MEDICATION LIST DOCUMENTED IN MEDICAL RECORD: ICD-10-PCS | Mod: CPTII,S$GLB,,

## 2022-06-06 PROCEDURE — 1101F PR PT FALLS ASSESS DOC 0-1 FALLS W/OUT INJ PAST YR: ICD-10-PCS | Mod: CPTII,S$GLB,,

## 2022-06-06 PROCEDURE — 1125F AMNT PAIN NOTED PAIN PRSNT: CPT | Mod: CPTII,S$GLB,,

## 2022-06-06 PROCEDURE — 3074F PR MOST RECENT SYSTOLIC BLOOD PRESSURE < 130 MM HG: ICD-10-PCS | Mod: CPTII,S$GLB,,

## 2022-06-06 PROCEDURE — 3066F NEPHROPATHY DOC TX: CPT | Mod: CPTII,S$GLB,,

## 2022-06-06 PROCEDURE — 1125F PR PAIN SEVERITY QUANTIFIED, PAIN PRESENT: ICD-10-PCS | Mod: CPTII,S$GLB,,

## 2022-06-06 PROCEDURE — 97597 DEBRIDEMENT: ICD-10-PCS | Mod: S$GLB,,,

## 2022-06-06 PROCEDURE — 3072F LOW RISK FOR RETINOPATHY: CPT | Mod: CPTII,S$GLB,,

## 2022-06-06 NOTE — PATIENT INSTRUCTIONS
Patient instructed to elevated legs while sitting  Patient warned not to get dressing wet and to use a cast cover for showering   If dressing should become wet, unroll each layer from the top going down, shower, irrigate for 10 minutes while in shower, pat dry and apply a wet-to-dry dressing over open wounds covered with roll gauze and secured with paper tape and two ace wrap bandages for compression  Notify this office immediately to have new dressing applied  Complete oral antibiotics until all gone  Verbal and written instructions given to patient and wife   Return to clinic 6/13/2022 at 1:30PM

## 2022-06-06 NOTE — PROCEDURES
"Debridement    Date/Time: 6/6/2022 4:00 PM  Performed by: Gracie Álvarez PA-C  Authorized by: Gracie Álvarez PA-C     Time out: Immediately prior to procedure a "time out" was called to verify the correct patient, procedure, equipment, support staff and site/side marked as required.    Consent Done?:  Yes (Written)  Local anesthesia used?: No      Wound Details:    Location:  Left leg (anterior)    Type of Debridement:  Excisional       Length (cm):  6.5       Area (sq cm):  26       Width (cm):  4       Percent Debrided (%):  65       Depth (cm):  0       Total Area Debrided (sq cm):  16.9    Depth of debridement:  Epidermis/Dermis    Tissue debrided:  Dermis and Epidermis    Devitalized tissue debrided:  Callus, Biofilm, Exudate, Fibrin and Slough    Instruments:  Scissors    Additional wounds:  1    2nd Wound Details:     Location:  Left leg (posterior)    Type of Debridement:  Excisional       Length (cm):  6       Area (sq cm):  30       Width (cm):  5       Percent Debrided (%):  50       Depth (cm):  0       Total Area Debrided (sq cm):  15    Depth of debridement:  Epidermis/Dermis    Tissue debrided:  Dermis and Epidermis    Devitalized tissue debrided:  Biofilm, Callus, Exudate, Fibrin and Slough    Instruments:  Scissors    Bleeding:  Minimal  Hemostasis Achieved: Yes    Method Used:  Pressure  Patient tolerance:  Patient tolerated the procedure well with no immediate complications      "

## 2022-06-06 NOTE — PROGRESS NOTES
Subjective:       Patient ID: Jono Gonzalez is a 67 y.o. male.With PMHx chronic venous insufficiency, BLE venous insufficiency, BLE venous insufficiency, DM2, HTN, HLD, DAVID (on CPAP), morbid obesity    Chief Complaint: No chief complaint on file.    Patient presents for reevaluation of 2 lower leg wounds with his wife. Pt was admitted on 6/3/22 and stayed until 6/4 at [facility]. Patient's wife stated he could not go to the ED on 6/2 because he wanted to take his wife to get her hair cut. In the ED, the patient was given vanc and zosyn. Aerobic cultures grew escherichia coli, kelbsiella oxytoca, and pseudomonas aeruginosa. The anaerobic culture grew bacteroides species. Discharged on Bactrim 2 DS BID for 7 days. Patient admits compliance with antibiotics. Patient states he occasionally experiences pain, but not as bad as before. Admits to drainage. Denies fever, chills, erythema, or warmth.      Review of Systems   Constitutional: Negative for activity change, chills, diaphoresis, fatigue and fever.   Respiratory: Negative for apnea, chest tightness and shortness of breath.    Cardiovascular: Positive for leg swelling. Negative for chest pain and palpitations.   Musculoskeletal: Positive for arthralgias (Chronic). Negative for gait problem and joint swelling.   Skin: Positive for color change and wound. Negative for pallor and rash.   Neurological: Negative for syncope, weakness and numbness.   Psychiatric/Behavioral: Negative for agitation. The patient is not nervous/anxious.    All other systems reviewed and are negative.      Objective:      Physical Exam  Vitals reviewed.   Constitutional:       General: He is not in acute distress.     Appearance: Normal appearance.   Cardiovascular:      Rate and Rhythm: Normal rate and regular rhythm.      Pulses: Normal pulses.   Pulmonary:      Effort: No respiratory distress.   Musculoskeletal:         General: No swelling.      Right lower leg: Edema present.       Left lower leg: Edema present.   Skin:     General: Skin is warm and dry.      Capillary Refill: Capillary refill takes less than 2 seconds.      Findings: Wound present.          Neurological:      General: No focal deficit present.      Mental Status: He is alert and oriented to person, place, and time.   Psychiatric:         Mood and Affect: Mood normal.         Behavior: Behavior normal.         Thought Content: Thought content normal.         Judgment: Judgment normal.         Assessment:       1. Cellulitis of left leg    2. Venous stasis ulcer of left lower extremity           Wound Left anterior;lower Leg (Active)        Pre-existing:    Primary Wound Type:    Side: Left   Orientation: anterior;lower   Location: Leg   Wound Number:    Ankle-Brachial Index:    Pulses:    Removal Indication and Assessment:    Wound Outcome:    (Retired) Wound Type:    (Retired) Wound Length (cm):    (Retired) Wound Width (cm):    (Retired) Depth (cm):    Wound Description (Comments):    Removal Indications:    Wound Image   06/06/22 1428   Dressing Appearance Intact;Dried drainage 06/06/22 1428   Drainage Amount Large 06/06/22 1428   Drainage Characteristics/Odor Serosanguineous;Brown 06/06/22 1428   Red (%), Wound Tissue Color 90 % 06/06/22 1428   Yellow (%), Wound Tissue Color 10 % 06/06/22 1428   Periwound Area Macerated;Moist 06/06/22 1428   Wound Length (cm) 6.5 cm 06/06/22 1428   Wound Width (cm) 4 cm 06/06/22 1428   Wound Surface Area (cm^2) 26 cm^2 06/06/22 1428   Care Cleansed with:;Soap and water 06/06/22 1428   Dressing Applied;Calcium alginate;Absorptive Pad 06/06/22 1428   Periwound Care Moisture barrier applied;Other (see comments) 06/06/22 1428   Compression Unna's Boot;Three layer compression;Other (see comments) 06/06/22 1428   Dressing Change Due 06/13/22 06/06/22 1428            Wound Left distal;posterior Calf (Active)        Pre-existing:    Primary Wound Type:    Side: Left   Orientation: distal;posterior    Location: Calf   Wound Number:    Ankle-Brachial Index:    Pulses:    Removal Indication and Assessment:    Wound Outcome:    (Retired) Wound Type:    (Retired) Wound Length (cm):    (Retired) Wound Width (cm):    (Retired) Depth (cm):    Wound Description (Comments):    Removal Indications:    Wound Image   06/06/22 1428   Dressing Appearance Intact;Dried drainage 06/06/22 1428   Drainage Amount Large 06/06/22 1428   Drainage Characteristics/Odor Serosanguineous;Brown 06/06/22 1428   Red (%), Wound Tissue Color 100 % 06/06/22 1428   Periwound Area Macerated;Moist 06/06/22 1428   Wound Length (cm) 6 cm 06/06/22 1428   Wound Width (cm) 5 cm 06/06/22 1428   Wound Surface Area (cm^2) 30 cm^2 06/06/22 1428   Care Cleansed with:;Soap and water 06/06/22 1428   Dressing Applied;Calcium alginate;Absorptive Pad 06/06/22 1428   Periwound Care Moisture barrier applied;Other (see comments) 06/06/22 1428   Compression Unna's Boot;Three layer compression;Other (see comments) 06/06/22 1428   Dressing Change Due 06/13/22 06/06/22 1428     Mr. Gonzalez was seen in wound care clinic today accompanied by his wife, placed is sitting position with legs elevated in treatment chair.  Mepilex foam border dressing intact with dry drainage noted, no odor noted.  Dressings removed and left leg washed with Easi-cleanse sponge and dried thoroughly - robert-wound skin macerated and wet. Evaluated and treated wounds - macerated skin removed with suture set - tolerated well.      Plan of care:  Calmoseptine barrier cream to robert-wound skin, calcium alginate to wound bed with three layer zinc oxide unna boot.     Patient's foot positioned at a 90 degree angle.  Zinc oxide unna boot followed by kerlix roll gauze and coban cohesive outer layer applied using a spiral technique avoiding creases of folds - with a mextra absorbant pad between the zinc oxide layer and the kerlix roll gauze layer.  Each layer was started behind the first metatarsal and ended  below the tibial tubercle knee. There was overlap of each turn half the width of the previous turn.  Return to clinic in one week and finish all oral antibiotics until all gone.         Plan:       Left lower extremity dressed as detailed above  Patient instructed to elevated legs while sitting  Patient warned not to get dressing wet and to use a cast cover for showering   If dressing should become wet, unroll each layer from the top going down, shower, irrigate for 10 minutes while in shower, pat dry and apply a wet-to-dry dressing over open wounds covered with roll gauze and secured with paper tape and two ace wrap bandages for compression  Notify this office immediately to have new dressing applied  Complete oral antibiotics as prescribed.  Verbal and written instructions given to patient and wife   Return to clinic 6/13/2022 at 1:30PM         Gracie Álvarez PA-C

## 2022-06-08 LAB
BACTERIA BLD CULT: NORMAL
BACTERIA BLD CULT: NORMAL

## 2022-06-10 ENCOUNTER — TELEPHONE (OUTPATIENT)
Dept: PRIMARY CARE CLINIC | Facility: CLINIC | Age: 67
End: 2022-06-10
Payer: COMMERCIAL

## 2022-06-10 NOTE — TELEPHONE ENCOUNTER
Can you call patient and clarify how much Lyrica he is taking, 75 mg 3 times a day?  Is he also taking his Norco 10 mg 3 times a day?    Are these medications prescribed by his neurologist/neurosurgeon and has he signed a pain contract with them?  If he has signed a pain contract with them, then they would be the ones that have to increase his medications.

## 2022-06-10 NOTE — TELEPHONE ENCOUNTER
"----- Message from Lynette Caicedo LPN sent at 6/10/2022  9:08 AM CDT -----  Contact: Pt [phone]    ----- Message -----  From: Elsa Lacey  Sent: 6/10/2022   8:52 AM CDT  To: [facility] Staff    Patient would like to know if you can change the MG on the following prescription to a higher dosage.  Patient states that the pain is "unreal"    RX pregabalin (LYRICA) 75 MG capsule    [id number] [address] AT [address] [address]  Phone: [phone] Fax: [phone]    Please call and advise.    Thank You        "

## 2022-06-13 ENCOUNTER — OFFICE VISIT (OUTPATIENT)
Dept: WOUND CARE | Facility: CLINIC | Age: 67
End: 2022-06-13
Payer: COMMERCIAL

## 2022-06-13 VITALS
DIASTOLIC BLOOD PRESSURE: 70 MMHG | HEIGHT: 67 IN | SYSTOLIC BLOOD PRESSURE: 124 MMHG | HEART RATE: 97 BPM | TEMPERATURE: 98 F | WEIGHT: 309.06 LBS | BODY MASS INDEX: 48.51 KG/M2

## 2022-06-13 DIAGNOSIS — I89.0 LYMPHEDEMA OF BOTH LOWER EXTREMITIES: ICD-10-CM

## 2022-06-13 DIAGNOSIS — L08.9 WOUND INFECTION: Primary | ICD-10-CM

## 2022-06-13 DIAGNOSIS — I87.2 CHRONIC VENOUS INSUFFICIENCY: ICD-10-CM

## 2022-06-13 DIAGNOSIS — T14.8XXA WOUND INFECTION: Primary | ICD-10-CM

## 2022-06-13 DIAGNOSIS — E78.2 MIXED HYPERLIPIDEMIA: ICD-10-CM

## 2022-06-13 DIAGNOSIS — I83.029 VENOUS STASIS ULCER OF LEFT LOWER EXTREMITY: ICD-10-CM

## 2022-06-13 DIAGNOSIS — L97.929 VENOUS STASIS ULCER OF LEFT LOWER EXTREMITY: ICD-10-CM

## 2022-06-13 DIAGNOSIS — E11.9 TYPE 2 DIABETES MELLITUS WITHOUT COMPLICATION, WITHOUT LONG-TERM CURRENT USE OF INSULIN: ICD-10-CM

## 2022-06-13 DIAGNOSIS — I10 PRIMARY HYPERTENSION: ICD-10-CM

## 2022-06-13 PROCEDURE — 3072F LOW RISK FOR RETINOPATHY: CPT | Mod: CPTII,S$GLB,,

## 2022-06-13 PROCEDURE — 4010F ACE/ARB THERAPY RXD/TAKEN: CPT | Mod: CPTII,S$GLB,,

## 2022-06-13 PROCEDURE — 3288F FALL RISK ASSESSMENT DOCD: CPT | Mod: CPTII,S$GLB,,

## 2022-06-13 PROCEDURE — 1160F RVW MEDS BY RX/DR IN RCRD: CPT | Mod: CPTII,S$GLB,,

## 2022-06-13 PROCEDURE — 1160F PR REVIEW ALL MEDS BY PRESCRIBER/CLIN PHARMACIST DOCUMENTED: ICD-10-PCS | Mod: CPTII,S$GLB,,

## 2022-06-13 PROCEDURE — 3008F PR BODY MASS INDEX (BMI) DOCUMENTED: ICD-10-PCS | Mod: CPTII,S$GLB,,

## 2022-06-13 PROCEDURE — 3078F PR MOST RECENT DIASTOLIC BLOOD PRESSURE < 80 MM HG: ICD-10-PCS | Mod: CPTII,S$GLB,,

## 2022-06-13 PROCEDURE — 3078F DIAST BP <80 MM HG: CPT | Mod: CPTII,S$GLB,,

## 2022-06-13 PROCEDURE — 1101F PT FALLS ASSESS-DOCD LE1/YR: CPT | Mod: CPTII,S$GLB,,

## 2022-06-13 PROCEDURE — 1101F PR PT FALLS ASSESS DOC 0-1 FALLS W/OUT INJ PAST YR: ICD-10-PCS | Mod: CPTII,S$GLB,,

## 2022-06-13 PROCEDURE — 99214 PR OFFICE/OUTPT VISIT, EST, LEVL IV, 30-39 MIN: ICD-10-PCS | Mod: S$GLB,,,

## 2022-06-13 PROCEDURE — 3072F PR LOW RISK FOR RETINOPATHY: ICD-10-PCS | Mod: CPTII,S$GLB,,

## 2022-06-13 PROCEDURE — 3061F NEG MICROALBUMINURIA REV: CPT | Mod: CPTII,S$GLB,,

## 2022-06-13 PROCEDURE — 3074F PR MOST RECENT SYSTOLIC BLOOD PRESSURE < 130 MM HG: ICD-10-PCS | Mod: CPTII,S$GLB,,

## 2022-06-13 PROCEDURE — 3061F PR NEG MICROALBUMINURIA RESULT DOCUMENTED/REVIEW: ICD-10-PCS | Mod: CPTII,S$GLB,,

## 2022-06-13 PROCEDURE — 3066F NEPHROPATHY DOC TX: CPT | Mod: CPTII,S$GLB,,

## 2022-06-13 PROCEDURE — 3051F PR MOST RECENT HEMOGLOBIN A1C LEVEL 7.0 - < 8.0%: ICD-10-PCS | Mod: CPTII,S$GLB,,

## 2022-06-13 PROCEDURE — 99214 OFFICE O/P EST MOD 30 MIN: CPT | Mod: S$GLB,,,

## 2022-06-13 PROCEDURE — 1125F AMNT PAIN NOTED PAIN PRSNT: CPT | Mod: CPTII,S$GLB,,

## 2022-06-13 PROCEDURE — 99999 PR PBB SHADOW E&M-EST. PATIENT-LVL IV: ICD-10-PCS | Mod: PBBFAC,,,

## 2022-06-13 PROCEDURE — 1159F MED LIST DOCD IN RCRD: CPT | Mod: CPTII,S$GLB,,

## 2022-06-13 PROCEDURE — 3074F SYST BP LT 130 MM HG: CPT | Mod: CPTII,S$GLB,,

## 2022-06-13 PROCEDURE — 1125F PR PAIN SEVERITY QUANTIFIED, PAIN PRESENT: ICD-10-PCS | Mod: CPTII,S$GLB,,

## 2022-06-13 PROCEDURE — 3066F PR DOCUMENTATION OF TREATMENT FOR NEPHROPATHY: ICD-10-PCS | Mod: CPTII,S$GLB,,

## 2022-06-13 PROCEDURE — 1159F PR MEDICATION LIST DOCUMENTED IN MEDICAL RECORD: ICD-10-PCS | Mod: CPTII,S$GLB,,

## 2022-06-13 PROCEDURE — 3288F PR FALLS RISK ASSESSMENT DOCUMENTED: ICD-10-PCS | Mod: CPTII,S$GLB,,

## 2022-06-13 PROCEDURE — 4010F PR ACE/ARB THEARPY RXD/TAKEN: ICD-10-PCS | Mod: CPTII,S$GLB,,

## 2022-06-13 PROCEDURE — 99999 PR PBB SHADOW E&M-EST. PATIENT-LVL IV: CPT | Mod: PBBFAC,,,

## 2022-06-13 PROCEDURE — 3051F HG A1C>EQUAL 7.0%<8.0%: CPT | Mod: CPTII,S$GLB,,

## 2022-06-13 PROCEDURE — 3008F BODY MASS INDEX DOCD: CPT | Mod: CPTII,S$GLB,,

## 2022-06-13 NOTE — PATIENT INSTRUCTIONS
Patient instructed to elevated legs while sitting  Patient warned not to get dressing wet and to use a cast cover for showering   If dressing should become wet, unroll each layer from the top going down, shower, irrigate for 10 minutes while in shower, pat dry and apply a wet-to-dry dressing over open wounds covered with roll gauze and secured with paper tape and two ace wrap bandages for compression  Notify this office immediately to have new dressing applied  Complete oral antibiotics as prescribed.  Verbal and written instructions given to patient   Return to clinic 6/20/2022

## 2022-06-13 NOTE — PROGRESS NOTES
Subjective:       Patient ID: Jono Gonzalez is a 67 y.o. male. With PMHx chronic venous insufficiency, BLE venous insufficiency, BLE venous insufficiency, DM2, HTN, HLD, DAVID (on CPAP), morbid obesity    Chief Complaint: No chief complaint on file.    Patient presents for reevaluation of 2 lower leg wounds. Pt states that he finished his Bactrim prescription but he states he has half the pills left. Patient admits to pain, and he states he is seeing his PCP tomorrow for a new lyrica prescription. Reports taking norco and lyrica for pain. No complaints at this time. Denies fever, chills, erythema, warmth, or drainage.      Review of Systems   Constitutional: Negative for activity change, chills, diaphoresis, fatigue and fever.   Respiratory: Negative for apnea, chest tightness and shortness of breath.    Cardiovascular: Positive for leg swelling. Negative for chest pain and palpitations.   Musculoskeletal: Positive for arthralgias (Chronic). Negative for gait problem and joint swelling.   Skin: Positive for wound. Negative for pallor and rash.   Neurological: Negative for syncope, weakness and numbness.   Psychiatric/Behavioral: Negative for agitation. The patient is not nervous/anxious.    All other systems reviewed and are negative.      Objective:      Physical Exam  Vitals reviewed.   Constitutional:       General: He is not in acute distress.     Appearance: Normal appearance.   Cardiovascular:      Rate and Rhythm: Normal rate and regular rhythm.      Pulses: Normal pulses.   Pulmonary:      Effort: No respiratory distress.   Musculoskeletal:         General: No swelling.      Right lower leg: Edema present.      Left lower leg: Edema present.   Skin:     General: Skin is warm and dry.      Capillary Refill: Capillary refill takes less than 2 seconds.      Findings: Wound present.          Neurological:      General: No focal deficit present.      Mental Status: He is alert and oriented to person, place, and  time.   Psychiatric:         Mood and Affect: Mood normal.         Behavior: Behavior normal.         Thought Content: Thought content normal.         Judgment: Judgment normal.         Assessment:       1. Wound infection    2. Venous stasis ulcer of left lower extremity    3. Lymphedema of both lower extremities    4. Chronic venous insufficiency    5. Primary hypertension    6. Type 2 diabetes mellitus without complication, without long-term current use of insulin    7. Mixed hyperlipidemia    8. BMI 45.0-49.9, adult           Wound Left anterior;lower Leg (Active)        Pre-existing:    Primary Wound Type:    Side: Left   Orientation: anterior;lower   Location: Leg   Wound Number:    Ankle-Brachial Index:    Pulses:    Removal Indication and Assessment:    Wound Outcome:    (Retired) Wound Type:    (Retired) Wound Length (cm):    (Retired) Wound Width (cm):    (Retired) Depth (cm):    Wound Description (Comments):    Removal Indications:    Wound Image   06/13/22 1533   Dressing Appearance Dry;Intact;Clean 06/13/22 1533   Drainage Amount Large 06/13/22 1533   Drainage Characteristics/Odor Serosanguineous;Tan;Yellow 06/13/22 1533   Red (%), Wound Tissue Color 100 % 06/13/22 1533   Periwound Area Intact;Swelling;Pink;Macerated 06/13/22 1533   Wound Length (cm) 6.6 cm 06/13/22 1533   Wound Width (cm) 4.7 cm 06/13/22 1533   Wound Surface Area (cm^2) 31.02 cm^2 06/13/22 1533   Care Cleansed with:;Soap and water 06/13/22 1533   Dressing Applied;Calcium alginate 06/13/22 1533   Periwound Care Moisture barrier applied;Topical treatment applied;Other (see comments) 06/13/22 1533   Compression Unna's Boot;Three layer compression;Other (see comments) 06/13/22 1533   Dressing Change Due 06/20/22 06/13/22 1533            Wound Left posterior Calf (Active)        Pre-existing:    Primary Wound Type:    Side: Left   Orientation: posterior   Location: Calf   Wound Number:    Ankle-Brachial Index:    Pulses:    Removal  Indication and Assessment:    Wound Outcome:    (Retired) Wound Type:    (Retired) Wound Length (cm):    (Retired) Wound Width (cm):    (Retired) Depth (cm):    Wound Description (Comments):    Removal Indications:    Wound Image    06/13/22 1533   Dressing Appearance Dry;Intact;Clean 06/13/22 1533   Drainage Amount Large 06/13/22 1533   Drainage Characteristics/Odor Serosanguineous;Tan;Yellow 06/13/22 1533   Red (%), Wound Tissue Color 100 % 06/13/22 1533   Periwound Area Intact;Pink;Swelling;Macerated 06/13/22 1533   Wound Length (cm) 5.7 cm 06/13/22 1533   Wound Width (cm) 6.3 cm 06/13/22 1533   Wound Surface Area (cm^2) 35.91 cm^2 06/13/22 1533   Care Cleansed with:;Soap and water 06/13/22 1533   Dressing Applied;Calcium alginate 06/13/22 1533   Periwound Care Moisture barrier applied;Other (see comments);Topical treatment applied 06/13/22 1533   Compression Unna's Boot;Three layer compression;Other (see comments) 06/13/22 1533   Dressing Change Due 06/20/22 06/13/22 1533     Mr. Gonzalez was seen in wound care clinic today placed is sitting position with legs elevated in treatment chair.  Unna boot dressing intact to left lower leg, unna boot dressing removed and left leg washed with Easi-cleanse sponge and dried thoroughly - robert-wound skin macerated.  Evaluated and treated wounds - macerated skin removed with pickups and scissors - tolerated well.      Plan of care:  Calmoseptine barrier cream to robert-wound skin, calcium alginate to wound bed with three layer zinc oxide unna boot.     Patient's foot positioned at a 90 degree angle.  Zinc oxide unna boot followed by kerlix roll gauze and coban cohesive outer layer applied using a spiral technique avoiding creases of folds - with a mextra absorbant pad between the zinc oxide layer and the kerlix roll gauze layer.  Each layer was started behind the first metatarsal and ended below the tibial tubercle knee. There was overlap of each turn half the width of the  previous turn.  Return to clinic in one week and finish all oral antibiotics until all pills are gone.             Plan:       Left lower extremity dressed as detailed above  Patient instructed to elevated legs while sitting  Pt instructed not to get dressing wet and to use a cast cover for showering   If dressing should become wet, unroll each layer from the top going down, shower, irrigate for 10 minutes while in shower, pat dry and apply a wet-to-dry dressing over open wounds covered with roll gauze and secured with paper tape and two ace wrap bandages for compression  Notify this office immediately to have new dressing applied  Instructed pt to read pill bottle to complete antibiotics as directed. Pt voiced understanding.  Verbal and written instructions given to patient   Return to clinic 6/20/2022        Gracie Álvarez PA-C

## 2022-06-13 NOTE — TELEPHONE ENCOUNTER
Spoke to pt who states he takes both lyrica and norco as prescribed still feels burning and tingling in legs, States he did sign contract with provider at [facility] a call for med changes has not heard back from anyone, also states he moved his care to [facility].

## 2022-06-14 ENCOUNTER — OFFICE VISIT (OUTPATIENT)
Dept: PRIMARY CARE CLINIC | Facility: CLINIC | Age: 67
End: 2022-06-14
Payer: COMMERCIAL

## 2022-06-14 VITALS
HEIGHT: 67 IN | HEART RATE: 104 BPM | OXYGEN SATURATION: 95 % | DIASTOLIC BLOOD PRESSURE: 56 MMHG | SYSTOLIC BLOOD PRESSURE: 106 MMHG | WEIGHT: 309.44 LBS | BODY MASS INDEX: 48.57 KG/M2

## 2022-06-14 DIAGNOSIS — L03.116 CELLULITIS OF LEFT LOWER EXTREMITY: ICD-10-CM

## 2022-06-14 DIAGNOSIS — L08.9 WOUND INFECTION: ICD-10-CM

## 2022-06-14 DIAGNOSIS — E11.9 DIABETES MELLITUS WITHOUT COMPLICATION: Primary | ICD-10-CM

## 2022-06-14 DIAGNOSIS — E66.01 MORBID OBESITY: ICD-10-CM

## 2022-06-14 DIAGNOSIS — G57.93 NEUROPATHY INVOLVING BOTH LOWER EXTREMITIES: ICD-10-CM

## 2022-06-14 DIAGNOSIS — T14.8XXA WOUND INFECTION: ICD-10-CM

## 2022-06-14 DIAGNOSIS — R60.0 EDEMA OF BOTH LOWER EXTREMITIES: ICD-10-CM

## 2022-06-14 PROCEDURE — 1159F PR MEDICATION LIST DOCUMENTED IN MEDICAL RECORD: ICD-10-PCS | Mod: CPTII,S$GLB,, | Performed by: FAMILY MEDICINE

## 2022-06-14 PROCEDURE — 99999 PR PBB SHADOW E&M-EST. PATIENT-LVL IV: ICD-10-PCS | Mod: PBBFAC,,, | Performed by: FAMILY MEDICINE

## 2022-06-14 PROCEDURE — 3008F PR BODY MASS INDEX (BMI) DOCUMENTED: ICD-10-PCS | Mod: CPTII,S$GLB,, | Performed by: FAMILY MEDICINE

## 2022-06-14 PROCEDURE — 99999 PR PBB SHADOW E&M-EST. PATIENT-LVL IV: CPT | Mod: PBBFAC,,, | Performed by: FAMILY MEDICINE

## 2022-06-14 PROCEDURE — 4010F ACE/ARB THERAPY RXD/TAKEN: CPT | Mod: CPTII,S$GLB,, | Performed by: FAMILY MEDICINE

## 2022-06-14 PROCEDURE — 3072F PR LOW RISK FOR RETINOPATHY: ICD-10-PCS | Mod: CPTII,S$GLB,, | Performed by: FAMILY MEDICINE

## 2022-06-14 PROCEDURE — 3074F SYST BP LT 130 MM HG: CPT | Mod: CPTII,S$GLB,, | Performed by: FAMILY MEDICINE

## 2022-06-14 PROCEDURE — 3078F PR MOST RECENT DIASTOLIC BLOOD PRESSURE < 80 MM HG: ICD-10-PCS | Mod: CPTII,S$GLB,, | Performed by: FAMILY MEDICINE

## 2022-06-14 PROCEDURE — 4010F PR ACE/ARB THEARPY RXD/TAKEN: ICD-10-PCS | Mod: CPTII,S$GLB,, | Performed by: FAMILY MEDICINE

## 2022-06-14 PROCEDURE — 1101F PT FALLS ASSESS-DOCD LE1/YR: CPT | Mod: CPTII,S$GLB,, | Performed by: FAMILY MEDICINE

## 2022-06-14 PROCEDURE — 1125F AMNT PAIN NOTED PAIN PRSNT: CPT | Mod: CPTII,S$GLB,, | Performed by: FAMILY MEDICINE

## 2022-06-14 PROCEDURE — 3051F PR MOST RECENT HEMOGLOBIN A1C LEVEL 7.0 - < 8.0%: ICD-10-PCS | Mod: CPTII,S$GLB,, | Performed by: FAMILY MEDICINE

## 2022-06-14 PROCEDURE — 3066F PR DOCUMENTATION OF TREATMENT FOR NEPHROPATHY: ICD-10-PCS | Mod: CPTII,S$GLB,, | Performed by: FAMILY MEDICINE

## 2022-06-14 PROCEDURE — 3072F LOW RISK FOR RETINOPATHY: CPT | Mod: CPTII,S$GLB,, | Performed by: FAMILY MEDICINE

## 2022-06-14 PROCEDURE — 1159F MED LIST DOCD IN RCRD: CPT | Mod: CPTII,S$GLB,, | Performed by: FAMILY MEDICINE

## 2022-06-14 PROCEDURE — 3074F PR MOST RECENT SYSTOLIC BLOOD PRESSURE < 130 MM HG: ICD-10-PCS | Mod: CPTII,S$GLB,, | Performed by: FAMILY MEDICINE

## 2022-06-14 PROCEDURE — 3061F PR NEG MICROALBUMINURIA RESULT DOCUMENTED/REVIEW: ICD-10-PCS | Mod: CPTII,S$GLB,, | Performed by: FAMILY MEDICINE

## 2022-06-14 PROCEDURE — 99214 OFFICE O/P EST MOD 30 MIN: CPT | Mod: S$GLB,,, | Performed by: FAMILY MEDICINE

## 2022-06-14 PROCEDURE — 99214 PR OFFICE/OUTPT VISIT, EST, LEVL IV, 30-39 MIN: ICD-10-PCS | Mod: S$GLB,,, | Performed by: FAMILY MEDICINE

## 2022-06-14 PROCEDURE — 1160F PR REVIEW ALL MEDS BY PRESCRIBER/CLIN PHARMACIST DOCUMENTED: ICD-10-PCS | Mod: CPTII,S$GLB,, | Performed by: FAMILY MEDICINE

## 2022-06-14 PROCEDURE — 3066F NEPHROPATHY DOC TX: CPT | Mod: CPTII,S$GLB,, | Performed by: FAMILY MEDICINE

## 2022-06-14 PROCEDURE — 3288F PR FALLS RISK ASSESSMENT DOCUMENTED: ICD-10-PCS | Mod: CPTII,S$GLB,, | Performed by: FAMILY MEDICINE

## 2022-06-14 PROCEDURE — 3288F FALL RISK ASSESSMENT DOCD: CPT | Mod: CPTII,S$GLB,, | Performed by: FAMILY MEDICINE

## 2022-06-14 PROCEDURE — 3008F BODY MASS INDEX DOCD: CPT | Mod: CPTII,S$GLB,, | Performed by: FAMILY MEDICINE

## 2022-06-14 PROCEDURE — 3051F HG A1C>EQUAL 7.0%<8.0%: CPT | Mod: CPTII,S$GLB,, | Performed by: FAMILY MEDICINE

## 2022-06-14 PROCEDURE — 1160F RVW MEDS BY RX/DR IN RCRD: CPT | Mod: CPTII,S$GLB,, | Performed by: FAMILY MEDICINE

## 2022-06-14 PROCEDURE — 3061F NEG MICROALBUMINURIA REV: CPT | Mod: CPTII,S$GLB,, | Performed by: FAMILY MEDICINE

## 2022-06-14 PROCEDURE — 3078F DIAST BP <80 MM HG: CPT | Mod: CPTII,S$GLB,, | Performed by: FAMILY MEDICINE

## 2022-06-14 PROCEDURE — 1101F PR PT FALLS ASSESS DOC 0-1 FALLS W/OUT INJ PAST YR: ICD-10-PCS | Mod: CPTII,S$GLB,, | Performed by: FAMILY MEDICINE

## 2022-06-14 PROCEDURE — 1125F PR PAIN SEVERITY QUANTIFIED, PAIN PRESENT: ICD-10-PCS | Mod: CPTII,S$GLB,, | Performed by: FAMILY MEDICINE

## 2022-06-14 RX ORDER — METFORMIN HYDROCHLORIDE 500 MG/1
500 TABLET, EXTENDED RELEASE ORAL 2 TIMES DAILY WITH MEALS
Qty: 180 TABLET | Refills: 3 | Status: SHIPPED | OUTPATIENT
Start: 2022-06-14 | End: 2023-08-24 | Stop reason: SDUPTHER

## 2022-06-14 RX ORDER — PREGABALIN 100 MG/1
100 CAPSULE ORAL 3 TIMES DAILY
Qty: 90 CAPSULE | Refills: 1 | Status: SHIPPED | OUTPATIENT
Start: 2022-06-14 | End: 2022-09-01

## 2022-06-14 NOTE — MEDICAL/APP STUDENT
Subjective:       Patient ID: Jono Gonzalez is a 67 y.o. male here for f/u appointment.    Chief Complaint: Follow-up (3 month)    Pt with hx of chronic venous insufficiency, htn, gout, DM, here for f/u visit after hospital stay and refill/increase on Lyrica prescription.     Pt was seen in hospital x11 days ago for cellulitis and venous stasis ulcers of the RLE. Pt is being followed by Gracie Álvarez PA-C with wound care at [facility]. Pt to follow up again on 6/20.     DM - pt requesting increase and refill of Lyrica prescription. Pt was taking 75mg TID, but reports sometimes he would take it up to 5 times a day. Pt reports taking Metformin 500mg once daily. Pt on Ozempic which he reports weight loss and no side effects from medication. Discussed increasing dose, but pt unsure of amount he is currently taking.    Follow-up  Pertinent negatives include no abdominal pain, chest pain, chills, congestion, coughing, fatigue, fever, nausea, sore throat or vomiting.     Review of Systems   Constitutional: Negative for chills, fatigue and fever.   HENT: Negative for nasal congestion, rhinorrhea and sore throat.    Eyes: Negative for discharge and redness.   Respiratory: Negative for cough, chest tightness and shortness of breath.    Cardiovascular: Negative for chest pain.   Gastrointestinal: Negative for abdominal distention, abdominal pain, constipation, diarrhea, nausea and vomiting.   Genitourinary: Negative for dysuria and hematuria.   Musculoskeletal: Negative.    Neurological: Negative.    Psychiatric/Behavioral: Negative.          Objective:      Physical Exam  Vitals and nursing note reviewed.   Constitutional:       General: He is not in acute distress.     Appearance: Normal appearance. He is obese. He is not ill-appearing or toxic-appearing.   HENT:      Head: Normocephalic and atraumatic.      Nose: Nose normal. No congestion or rhinorrhea.      Mouth/Throat:      Pharynx: Oropharynx is clear.   Eyes:       General:         Right eye: No discharge.         Left eye: No discharge.      Extraocular Movements: Extraocular movements intact.   Cardiovascular:      Rate and Rhythm: Normal rate.      Heart sounds: Normal heart sounds.   Pulmonary:      Effort: Pulmonary effort is normal. No respiratory distress.      Breath sounds: Normal breath sounds. No stridor. No wheezing.   Abdominal:      General: There is no distension.      Tenderness: There is no abdominal tenderness. There is no guarding.   Musculoskeletal:         General: Normal range of motion.      Cervical back: Normal range of motion.   Skin:     General: Skin is warm and dry.   Neurological:      General: No focal deficit present.      Mental Status: He is alert and oriented to person, place, and time.         Assessment:       Problem List Items Addressed This Visit    None     Visit Diagnoses     Diabetes mellitus without complication        Relevant Medications    metFORMIN (GLUCOPHAGE-XR) 500 MG ER 24hr tablet    Other Relevant Orders    Hemoglobin A1C          Plan:       1. Neuropathy involving both lower extremities  - pregabalin (LYRICA) 100 MG capsule; Take 1 capsule (100 mg total) by mouth 3 (three) times daily.  Dispense: 90 capsule; Refill: 1    2. Diabetes mellitus without complication  - metFORMIN (GLUCOPHAGE-XR) 500 MG ER 24hr tablet; Take 1 tablet (500 mg total) by mouth 2 (two) times daily with meals.  Dispense: 180 tablet; Refill: 3  - Hemoglobin A1C; Future    3. Wound infection    4. Cellulitis of left lower extremity    5. Morbid obesity    6. Edema of both lower extremities

## 2022-06-14 NOTE — PROGRESS NOTES
Subjective:       Patient ID: Jono Gonzalez is a 67 y.o. male here for f/u appointment.    Chief Complaint: Follow-up (3 month)    Pt with hx of chronic venous insufficiency, htn, gout, DM, here for f/u visit after hospital stay and refill/increase on Lyrica prescription.     Left leg venous stasis ulcer and cellulitis - Pt was seen in hospital x11 days ago for cellulitis and venous stasis ulcers of the LLE.  Initially on IV antibiotics and transition to Bactrim.  Pt is being followed by Gracie Álvarez PA-C with wound care at [facility]. Pt to follow up again on 6/20/22.     DM - pt requesting increase and refill of Lyrica prescription. Pt was taking 75mg TID, but reports sometimes he would take it up to 5 times a day. Pt reports taking Metformin 500mg once daily. Pt on Ozempic which he reports weight loss and no side effects from medication. Discussed increasing dose, but pt unsure of amount he is currently taking.    Follow-up  Pertinent negatives include no abdominal pain, chest pain, chills, congestion, coughing, fatigue, fever, nausea, sore throat or vomiting.     Review of Systems   Constitutional: Negative for chills, fatigue and fever.   HENT: Negative for nasal congestion, rhinorrhea and sore throat.    Eyes: Negative for discharge and redness.   Respiratory: Negative for cough, chest tightness and shortness of breath.    Cardiovascular: Negative for chest pain.   Gastrointestinal: Negative for abdominal distention, abdominal pain, constipation, diarrhea, nausea and vomiting.   Genitourinary: Negative for dysuria and hematuria.   Musculoskeletal: Negative.    Neurological: Negative.    Psychiatric/Behavioral: Negative.          Objective:      Physical Exam  Vitals and nursing note reviewed.   Constitutional:       General: He is not in acute distress.     Appearance: Normal appearance. He is obese. He is not ill-appearing or toxic-appearing.   HENT:      Head: Normocephalic and atraumatic.      Nose: Nose  normal. No congestion or rhinorrhea.      Mouth/Throat:      Pharynx: Oropharynx is clear.   Eyes:      General:         Right eye: No discharge.         Left eye: No discharge.      Extraocular Movements: Extraocular movements intact.   Cardiovascular:      Rate and Rhythm: Normal rate.      Heart sounds: Normal heart sounds.   Pulmonary:      Effort: Pulmonary effort is normal. No respiratory distress.      Breath sounds: Normal breath sounds. No stridor. No wheezing.   Abdominal:      General: There is no distension.      Tenderness: There is no abdominal tenderness. There is no guarding.   Musculoskeletal:         General: Normal range of motion.      Cervical back: Normal range of motion.   Skin:     General: Skin is warm and dry.   Neurological:      General: No focal deficit present.      Mental Status: He is alert and oriented to person, place, and time.         Assessment:       Problem List Items Addressed This Visit    None     Visit Diagnoses     Diabetes mellitus without complication        Relevant Medications    metFORMIN (GLUCOPHAGE-XR) 500 MG ER 24hr tablet    Other Relevant Orders    Hemoglobin A1C          Plan:       1. Neuropathy involving both lower extremities  - can increase to Lyrica 100 mg t.i.d. to see this helps with lower extremity neuropathy.  Can continue to titrate up for max Lyrica 600 mg q.d..  Continue Norco 10 p.r.n. as prescribed by Neurosurgery  - pregabalin (LYRICA) 100 MG capsule; Take 1 capsule (100 mg total) by mouth 3 (three) times daily.  Dispense: 90 capsule; Refill: 1    2. Diabetes mellitus without complication  - recent hemoglobin A1c 7.1, increased to 500 mg b.i.d..  Unclear if patient is on Ozempic 0.25 mg or 0.5 mg.  Patient thinks he is on Ozempic 0.25 mg once weekly, instructed patient to increase to 0.5 mg.  Repeat hemoglobin A1c in 2 months  - metFORMIN (GLUCOPHAGE-XR) 500 MG ER 24hr tablet; Take 1 tablet (500 mg total) by mouth 2 (two) times daily with meals.   Dispense: 180 tablet; Refill: 3  - Hemoglobin A1C; Future    3. Wound infection /  Cellulitis of left lower extremity  - continue Bactrim and follow-up with wound care    5. Morbid obesity  - titrate up Ozempic    6. Edema of both lower extremities    I saw and evaluated the patient and discussed the care with  NIKO Beckham. I agree with the findings and plan as documented in the note above.      Jim Linn MD    F/u: labs Ha1c in July 22nd, and f/u with Marilee in 3 months

## 2022-06-14 NOTE — PROGRESS NOTES
Clinic Note  6/14/2022      Subjective:       Patient ID:  Jono is a 67 y.o. male being seen for an established visit.    Chief Complaint: Follow-up (3 month)      Wound care just seen yseterday.     ***    ROS    Medication List with Changes/Refills   Current Medications    ALLOPURINOL (ZYLOPRIM) 300 MG TABLET    Take 1 tablet (300 mg total) by mouth once daily.    AMLODIPINE (NORVASC) 10 MG TABLET    Take 10 mg by mouth once daily.    ANASTROZOLE (ARIMIDEX) 1 MG TAB    Take 1 mg by mouth once daily.    ASPIRIN (ECOTRIN) 81 MG EC TABLET    Take 81 mg by mouth once daily.    ATORVASTATIN (LIPITOR) 40 MG TABLET    Take 1 tablet (40 mg total) by mouth every evening. For cholesterol    CLOTRIMAZOLE-BETAMETHASONE 1-0.05% (LOTRISONE) CREAM    Apply topically once daily. To feet and toenails    FINASTERIDE (PROSCAR) 5 MG TABLET    TK 1 T PO QD    FUROSEMIDE (LASIX) 40 MG TABLET    Take 1 tablet (40 mg total) by mouth 2 (two) times a day.    HYDROCODONE-ACETAMINOPHEN 5-325MG (NORCO) 5-325 MG PER TABLET    TK 1 T PO  Q 6 TO 8 H    METFORMIN (GLUCOPHAGE-XR) 500 MG ER 24HR TABLET    Take 1 tablet (500 mg total) by mouth once daily.    OLMESARTAN (BENICAR) 40 MG TABLET    Take 1 tablet (40 mg total) by mouth once daily. Blood pressure    PREGABALIN (LYRICA) 75 MG CAPSULE    TAKE 1 CAPSULE(75 MG) BY MOUTH EVERY DAY    SEMAGLUTIDE (OZEMPIC) 0.25 MG OR 0.5 MG(2 MG/1.5 ML) PEN INJECTOR    Inject 0.5 mg into the skin every 7 days.    TAMSULOSIN (FLOMAX) 0.4 MG CP24    TK 1 C PO BID       Patient Active Problem List   Diagnosis    Hypertension    BPH with obstruction/lower urinary tract symptoms    Chronic venous insufficiency    Gouty arthropathy    HLD (hyperlipidemia)    DAVID (obstructive sleep apnea)    Type 2 diabetes mellitus without complication, without long-term current use of insulin    BMI 45.0-49.9, adult    RBBB    Wheezing    Narcotic dependence    Obesity    Male hypogonadism    Chronic back pain     "Acute pain of left knee    History of lumbar spinal fusion    Lymphedema of both lower extremities    Edema of both lower extremities    Swelling of lower limb    Venous stasis ulcer of left lower extremity    Morbid obesity    Wound infection    Cellulitis of left lower extremity           Objective:      BP (!) 106/56 (BP Location: Right arm, Patient Position: Sitting, BP Method: Thigh Cuff (Automatic))   Pulse 104   Ht 5' 7" (1.702 m)   Wt (!) 140.4 kg (309 lb 6.7 oz)   SpO2 95%   BMI 48.46 kg/m²   Estimated body mass index is 48.46 kg/m² as calculated from the following:    Height as of this encounter: 5' 7" (1.702 m).    Weight as of this encounter: 140.4 kg (309 lb 6.7 oz).  Physical Exam      Assessment and Plan:     ***    Follow Up:   No follow-ups on file.    Other Orders Placed This Visit:  No orders of the defined types were placed in this encounter.        Jim Linn MD        This note is dictated on M*Modal word recognition program.  There are word recognition mistakes that are occasionally missed on review.  "

## 2022-06-20 ENCOUNTER — PATIENT MESSAGE (OUTPATIENT)
Dept: PRIMARY CARE CLINIC | Facility: CLINIC | Age: 67
End: 2022-06-20
Payer: COMMERCIAL

## 2022-06-20 ENCOUNTER — OFFICE VISIT (OUTPATIENT)
Dept: WOUND CARE | Facility: CLINIC | Age: 67
End: 2022-06-20
Payer: COMMERCIAL

## 2022-06-20 VITALS
HEART RATE: 111 BPM | DIASTOLIC BLOOD PRESSURE: 66 MMHG | WEIGHT: 309.94 LBS | BODY MASS INDEX: 48.65 KG/M2 | HEIGHT: 67 IN | SYSTOLIC BLOOD PRESSURE: 104 MMHG | TEMPERATURE: 98 F

## 2022-06-20 DIAGNOSIS — I83.029 VENOUS STASIS ULCER OF LEFT LOWER EXTREMITY: ICD-10-CM

## 2022-06-20 DIAGNOSIS — L08.9 WOUND INFECTION: Primary | ICD-10-CM

## 2022-06-20 DIAGNOSIS — L97.929 VENOUS STASIS ULCER OF LEFT LOWER EXTREMITY: ICD-10-CM

## 2022-06-20 DIAGNOSIS — I87.2 CHRONIC VENOUS INSUFFICIENCY: ICD-10-CM

## 2022-06-20 DIAGNOSIS — E11.9 TYPE 2 DIABETES MELLITUS WITHOUT COMPLICATION, WITHOUT LONG-TERM CURRENT USE OF INSULIN: ICD-10-CM

## 2022-06-20 DIAGNOSIS — T14.8XXA WOUND INFECTION: Primary | ICD-10-CM

## 2022-06-20 DIAGNOSIS — I10 PRIMARY HYPERTENSION: ICD-10-CM

## 2022-06-20 DIAGNOSIS — I89.0 LYMPHEDEMA OF BOTH LOWER EXTREMITIES: ICD-10-CM

## 2022-06-20 LAB
GRAM STN SPEC: NORMAL
GRAM STN SPEC: NORMAL

## 2022-06-20 PROCEDURE — 97597 DEBRIDEMENT: ICD-10-PCS | Mod: S$GLB,,,

## 2022-06-20 PROCEDURE — 99499 NO LOS: ICD-10-PCS | Mod: S$GLB,,,

## 2022-06-20 PROCEDURE — 1101F PT FALLS ASSESS-DOCD LE1/YR: CPT | Mod: CPTII,S$GLB,,

## 2022-06-20 PROCEDURE — 87077 CULTURE AEROBIC IDENTIFY: CPT

## 2022-06-20 PROCEDURE — 87205 SMEAR GRAM STAIN: CPT

## 2022-06-20 PROCEDURE — 97597 DBRDMT OPN WND 1ST 20 CM/<: CPT | Mod: S$GLB,,,

## 2022-06-20 PROCEDURE — 87075 CULTR BACTERIA EXCEPT BLOOD: CPT

## 2022-06-20 PROCEDURE — 87184 SC STD DISK METHOD PER PLATE: CPT

## 2022-06-20 PROCEDURE — 3288F PR FALLS RISK ASSESSMENT DOCUMENTED: ICD-10-PCS | Mod: CPTII,S$GLB,,

## 2022-06-20 PROCEDURE — 3066F PR DOCUMENTATION OF TREATMENT FOR NEPHROPATHY: ICD-10-PCS | Mod: CPTII,S$GLB,,

## 2022-06-20 PROCEDURE — 87102 FUNGUS ISOLATION CULTURE: CPT

## 2022-06-20 PROCEDURE — 3051F PR MOST RECENT HEMOGLOBIN A1C LEVEL 7.0 - < 8.0%: ICD-10-PCS | Mod: CPTII,S$GLB,,

## 2022-06-20 PROCEDURE — 3078F DIAST BP <80 MM HG: CPT | Mod: CPTII,S$GLB,,

## 2022-06-20 PROCEDURE — 97598 DBRDMT OPN WND ADDL 20CM/<: CPT | Mod: S$GLB,,,

## 2022-06-20 PROCEDURE — 1159F PR MEDICATION LIST DOCUMENTED IN MEDICAL RECORD: ICD-10-PCS | Mod: CPTII,S$GLB,,

## 2022-06-20 PROCEDURE — 1101F PR PT FALLS ASSESS DOC 0-1 FALLS W/OUT INJ PAST YR: ICD-10-PCS | Mod: CPTII,S$GLB,,

## 2022-06-20 PROCEDURE — 3066F NEPHROPATHY DOC TX: CPT | Mod: CPTII,S$GLB,,

## 2022-06-20 PROCEDURE — 1160F RVW MEDS BY RX/DR IN RCRD: CPT | Mod: CPTII,S$GLB,,

## 2022-06-20 PROCEDURE — 3008F PR BODY MASS INDEX (BMI) DOCUMENTED: ICD-10-PCS | Mod: CPTII,S$GLB,,

## 2022-06-20 PROCEDURE — 1125F AMNT PAIN NOTED PAIN PRSNT: CPT | Mod: CPTII,S$GLB,,

## 2022-06-20 PROCEDURE — 3078F PR MOST RECENT DIASTOLIC BLOOD PRESSURE < 80 MM HG: ICD-10-PCS | Mod: CPTII,S$GLB,,

## 2022-06-20 PROCEDURE — 87070 CULTURE OTHR SPECIMN AEROBIC: CPT

## 2022-06-20 PROCEDURE — 3061F PR NEG MICROALBUMINURIA RESULT DOCUMENTED/REVIEW: ICD-10-PCS | Mod: CPTII,S$GLB,,

## 2022-06-20 PROCEDURE — 97598 DEBRIDEMENT: ICD-10-PCS | Mod: S$GLB,,,

## 2022-06-20 PROCEDURE — 3074F SYST BP LT 130 MM HG: CPT | Mod: CPTII,S$GLB,,

## 2022-06-20 PROCEDURE — 1125F PR PAIN SEVERITY QUANTIFIED, PAIN PRESENT: ICD-10-PCS | Mod: CPTII,S$GLB,,

## 2022-06-20 PROCEDURE — 3061F NEG MICROALBUMINURIA REV: CPT | Mod: CPTII,S$GLB,,

## 2022-06-20 PROCEDURE — 99499 UNLISTED E&M SERVICE: CPT | Mod: S$GLB,,,

## 2022-06-20 PROCEDURE — 1160F PR REVIEW ALL MEDS BY PRESCRIBER/CLIN PHARMACIST DOCUMENTED: ICD-10-PCS | Mod: CPTII,S$GLB,,

## 2022-06-20 PROCEDURE — 99999 PR PBB SHADOW E&M-EST. PATIENT-LVL V: CPT | Mod: PBBFAC,,,

## 2022-06-20 PROCEDURE — 3072F LOW RISK FOR RETINOPATHY: CPT | Mod: CPTII,S$GLB,,

## 2022-06-20 PROCEDURE — 3051F HG A1C>EQUAL 7.0%<8.0%: CPT | Mod: CPTII,S$GLB,,

## 2022-06-20 PROCEDURE — 3008F BODY MASS INDEX DOCD: CPT | Mod: CPTII,S$GLB,,

## 2022-06-20 PROCEDURE — 1159F MED LIST DOCD IN RCRD: CPT | Mod: CPTII,S$GLB,,

## 2022-06-20 PROCEDURE — 4010F PR ACE/ARB THEARPY RXD/TAKEN: ICD-10-PCS | Mod: CPTII,S$GLB,,

## 2022-06-20 PROCEDURE — 4010F ACE/ARB THERAPY RXD/TAKEN: CPT | Mod: CPTII,S$GLB,,

## 2022-06-20 PROCEDURE — 87186 SC STD MICRODIL/AGAR DIL: CPT | Mod: 59

## 2022-06-20 PROCEDURE — 3074F PR MOST RECENT SYSTOLIC BLOOD PRESSURE < 130 MM HG: ICD-10-PCS | Mod: CPTII,S$GLB,,

## 2022-06-20 PROCEDURE — 3072F PR LOW RISK FOR RETINOPATHY: ICD-10-PCS | Mod: CPTII,S$GLB,,

## 2022-06-20 PROCEDURE — 3288F FALL RISK ASSESSMENT DOCD: CPT | Mod: CPTII,S$GLB,,

## 2022-06-20 PROCEDURE — 99999 PR PBB SHADOW E&M-EST. PATIENT-LVL V: ICD-10-PCS | Mod: PBBFAC,,,

## 2022-06-20 RX ORDER — SULFAMETHOXAZOLE AND TRIMETHOPRIM 800; 160 MG/1; MG/1
2 TABLET ORAL 2 TIMES DAILY
Qty: 28 TABLET | Refills: 0 | Status: SHIPPED | OUTPATIENT
Start: 2022-06-20 | End: 2022-06-27

## 2022-06-20 NOTE — PROCEDURES
"Debridement    Date/Time: 6/20/2022 2:00 PM  Performed by: Gracie Álvarez PA-C  Authorized by: Gracie Álvarez PA-C     Time out: Immediately prior to procedure a "time out" was called to verify the correct patient, procedure, equipment, support staff and site/side marked as required.    Consent Done?:  Yes (Written)  Local anesthesia used?: Yes    Local anesthetic:  Topical anesthetic (Topical 4% Lidocaine Hydrochloride)    Wound Details:    Location:  Left leg (anterior)    Type of Debridement:  Excisional       Length (cm):  6.6       Area (sq cm):  34.98       Width (cm):  5.3       Percent Debrided (%):  100       Depth (cm):  0       Total Area Debrided (sq cm):  34.98    Depth of debridement:  Epidermis/Dermis    Tissue debrided:  Dermis and Epidermis    Devitalized tissue debrided:  Biofilm, Exudate, Fibrin and Slough    Instruments:  Curette    Additional wounds:  1    2nd Wound Details:     Location:  Left leg (posterior)    Type of Debridement:  Excisional       Length (cm):  4       Area (sq cm):  19.6       Width (cm):  4.9       Percent Debrided (%):  100       Depth (cm):  0       Total Area Debrided (sq cm):  19.6    Depth of debridement:  Epidermis/Dermis    Tissue debrided:  Dermis and Epidermis    Devitalized tissue debrided:  Biofilm, Exudate, Fibrin and Slough    Instruments:  Curette    Bleeding:  Minimal  Hemostasis Achieved: Yes    Method Used:  Silver Nitrate (4 needed- 2 anterior wound and 2 posterior wound)  Patient tolerance:  Patient tolerated the procedure well with no immediate complications      "

## 2022-06-20 NOTE — PROGRESS NOTES
Subjective:       Patient ID: Jono Gonzalez is a 67 y.o. male.  With PMHx chronic venous insufficiency, BLE venous insufficiency, BLE venous insufficiency, DM2, HTN, HLD, DAVID (on CPAP), morbid obesity    Chief Complaint: No chief complaint on file.    Patient presents with wife for reevaluation of 2 lower leg wounds. Pt was prescribed bactrim 800 mg DS 2 tablets by mouth 2x a day for 7 days when discharged from the ED on 6/4. Pt took 1 tablet BID for 7 days. Then, he followed up in the wound care clinic a week later- at that time he reported having half his prescription left. At that visit, he was instructed to finish the medication. Pt then completed the rest of the prescription. Since then, pt reports increased pain and odor from wound. Pt requests an increase in his lyrica and norco. Denies fever, chills, erythema, warmth, or drainage.       Review of Systems   Constitutional: Negative for activity change, chills, diaphoresis, fatigue and fever.   Respiratory: Negative for apnea, chest tightness and shortness of breath.    Cardiovascular: Positive for leg swelling. Negative for chest pain and palpitations.   Musculoskeletal: Negative for gait problem and joint swelling.   Skin: Positive for wound. Negative for pallor and rash.   Neurological: Negative for syncope, weakness and numbness.   Psychiatric/Behavioral: Negative for agitation. The patient is not nervous/anxious.    All other systems reviewed and are negative.      Objective:      Physical Exam  Vitals reviewed.   Constitutional:       General: He is not in acute distress.     Appearance: Normal appearance.   Cardiovascular:      Rate and Rhythm: Normal rate and regular rhythm.      Pulses: Normal pulses.   Pulmonary:      Effort: No respiratory distress.   Musculoskeletal:         General: No swelling.      Right lower leg: Edema present.      Left lower leg: Edema present.   Skin:     General: Skin is warm and dry.      Capillary Refill: Capillary  refill takes less than 2 seconds.      Findings: Erythema and wound present.             Comments: Odor noted on exam.   Neurological:      General: No focal deficit present.      Mental Status: He is alert and oriented to person, place, and time.   Psychiatric:         Mood and Affect: Mood normal.         Behavior: Behavior normal.         Thought Content: Thought content normal.         Judgment: Judgment normal.         Assessment:       1. Wound infection    2. Venous stasis ulcer of left lower extremity    3. Chronic venous insufficiency    4. Lymphedema of both lower extremities    5. Type 2 diabetes mellitus without complication, without long-term current use of insulin    6. Primary hypertension    7. BMI 45.0-49.9, adult           Wound Left anterior;lower Leg (Active)        Pre-existing:    Primary Wound Type:    Side: Left   Orientation: anterior;lower   Location: Leg   Wound Number:    Ankle-Brachial Index:    Pulses:    Removal Indication and Assessment:    Wound Outcome:    (Retired) Wound Type:    (Retired) Wound Length (cm):    (Retired) Wound Width (cm):    (Retired) Depth (cm):    Wound Description (Comments):    Removal Indications:    Wound Image   06/20/22 1427   Dressing Appearance Dry;Intact;Dried drainage 06/20/22 1427   Drainage Amount Large 06/20/22 1427   Drainage Characteristics/Odor Malodorous;Serosanguineous;Green;Brown 06/20/22 1427   Red (%), Wound Tissue Color 60 % 06/20/22 1427   Yellow (%), Wound Tissue Color 40 % 06/20/22 1427   Periwound Area Intact;Dry;Redness;Pink;Swelling 06/20/22 1427   Wound Length (cm) 6.6 cm 06/20/22 1427   Wound Width (cm) 5.3 cm 06/20/22 1427   Wound Surface Area (cm^2) 34.98 cm^2 06/20/22 1427   Care Cleansed with:;Soap and water 06/20/22 1427   Dressing Applied;Silver;Hydrofiber;Absorptive Pad 06/20/22 1427   Periwound Care Moisture barrier applied 06/20/22 1427   Compression Unna's Boot;Three layer compression;Other (see comments) 06/20/22 1427    Dressing Change Due 06/29/22 06/20/22 1427            Wound Left posterior Calf (Active)        Pre-existing:    Primary Wound Type:    Side: Left   Orientation: posterior   Location: Calf   Wound Number:    Ankle-Brachial Index:    Pulses:    Removal Indication and Assessment:    Wound Outcome:    (Retired) Wound Type:    (Retired) Wound Length (cm):    (Retired) Wound Width (cm):    (Retired) Depth (cm):    Wound Description (Comments):    Removal Indications:    Wound Image   06/20/22 1427   Dressing Appearance Dry;Intact;Dried drainage 06/20/22 1427   Drainage Amount Large 06/20/22 1427   Drainage Characteristics/Odor Green;Malodorous;Serosanguineous 06/20/22 1427   Red (%), Wound Tissue Color 60 % 06/20/22 1427   Yellow (%), Wound Tissue Color 40 % 06/20/22 1427   Periwound Area Intact;Dry;Redness;Pink;Swelling 06/20/22 1427   Wound Length (cm) 4 cm 06/20/22 1427   Wound Width (cm) 4.9 cm 06/20/22 1427   Wound Surface Area (cm^2) 19.6 cm^2 06/20/22 1427   Care Cleansed with:;Soap and water 06/20/22 1427   Dressing Applied;Silver;Hydrofiber;Absorptive Pad 06/20/22 1427   Periwound Care Moisture barrier applied;Other (see comments) 06/20/22 1427   Compression Unna's Boot;Three layer compression;Other (see comments) 06/20/22 1427   Dressing Change Due 06/29/22 06/20/22 1427     Mr. Gonzalez was seen in wound care clinic today accompanied by his wife, placed is sitting position with legs elevated in treatment chair.  Unna boot dressing intact to left lower leg;  dressing removed and left leg washed with Easi-cleanse sponge and dried thoroughly - odor detected with green drainage noted and patient complaint of pain 10 out of 10 on pain scale.    Evaluated and treated wounds - 4% Lidocaine Hydrochloride topical solution applied to wound beds for 5-10 minutes, sharp debridement with 5mm curette perform- tolerated well. Silver nitrate used maintain homeostasis in both wounds.       Plan of care:  Calmoseptine barrier  cream to robert-wound skin, silver alginate to wound bed with mextra and ABD absorbant pads and a  three layer zinc oxide unna boot.     Patient's foot positioned at a 90 degree angle.  Zinc oxide unna boot followed by kerlix roll gauze and coban cohesive outer layer applied using a spiral technique avoiding creases of folds - with a mextra and ABD absorbant pads between the zinc oxide layer and the kerlix roll gauze layer.  Each layer was started behind the first metatarsal and ended below the tibial tubercle knee. There was overlap of each turn half the width of the previous turn.  Return to clinic in one week.             Plan:     Discussed compliance with pt and wife. Due to increased pain, green discharge, erythema, odor from wounds, and failing to take antibiotics as prescribed, prescribed Bactrim  mg 2 tablets 2 times a day for 7 days again. Instructed pt to take medication as prescribed and complete prescription. Pt and wife voiced understanding. Cultured posterior wound again. Ordered gram stain, aerobic culture, anaerobic culture, and fungal culture. Discussed possibly needing IV antibiotics if failing this prescription of Bactrim. ED precautions given. Pt and wife voiced understanding.     Instructed pt to disucss with PCP about his norco and lyrica prescriptions.    Left lower extremity dressed as detailed above  Patient instructed to elevated legs while sitting  Pt instructed not to get dressing wet and to use a cast cover for showering   If dressing should become wet, unroll each layer from the top going down, shower, irrigate for 10 minutes while in shower, pat dry and apply a wet-to-dry dressing over open wounds covered with roll gauze and secured with paper tape and two ace wrap bandages for compression  Notify this office immediately to have new dressing applied      Verbal and written instructions given to patient and wife  Return to clinic 6/29/2022        Gracie Álvarez PA-C

## 2022-06-20 NOTE — PATIENT INSTRUCTIONS
Patient instructed to elevated legs while sitting  Pt instructed not to get dressing wet and to use a cast cover for showering   If dressing should become wet, unroll each layer from the top going down, shower, irrigate for 10 minutes while in shower, pat dry and apply a wet-to-dry dressing over open wounds covered with roll gauze and secured with paper tape and two ace wrap bandages for compression  Notify this office immediately to have new dressing applied  Oral antibiotic therapy ordered and instructed to take as prescribed  Verbal and written instructions given to patient and wife  Return to clinic 6/29/2022        Gracie Álvarez PA-C

## 2022-06-23 ENCOUNTER — PATIENT MESSAGE (OUTPATIENT)
Dept: WOUND CARE | Facility: CLINIC | Age: 67
End: 2022-06-23
Payer: COMMERCIAL

## 2022-06-24 LAB
BACTERIA SPEC AEROBE CULT: ABNORMAL
BACTERIA SPEC ANAEROBE CULT: NORMAL

## 2022-06-28 ENCOUNTER — TELEPHONE (OUTPATIENT)
Dept: BARIATRICS | Facility: CLINIC | Age: 67
End: 2022-06-28
Payer: COMMERCIAL

## 2022-06-29 ENCOUNTER — OFFICE VISIT (OUTPATIENT)
Dept: WOUND CARE | Facility: CLINIC | Age: 67
End: 2022-06-29
Payer: COMMERCIAL

## 2022-06-29 ENCOUNTER — HOSPITAL ENCOUNTER (OUTPATIENT)
Dept: CARDIOLOGY | Facility: HOSPITAL | Age: 67
Discharge: HOME | End: 2022-06-29
Attending: INTERNAL MEDICINE
Payer: COMMERCIAL

## 2022-06-29 VITALS
WEIGHT: 303.81 LBS | DIASTOLIC BLOOD PRESSURE: 69 MMHG | BODY MASS INDEX: 47.69 KG/M2 | HEART RATE: 98 BPM | SYSTOLIC BLOOD PRESSURE: 127 MMHG | HEIGHT: 67 IN | TEMPERATURE: 98 F

## 2022-06-29 DIAGNOSIS — I87.2 CHRONIC VENOUS INSUFFICIENCY: ICD-10-CM

## 2022-06-29 DIAGNOSIS — L97.929 VENOUS STASIS ULCER OF LEFT LOWER EXTREMITY: ICD-10-CM

## 2022-06-29 DIAGNOSIS — E11.9 TYPE 2 DIABETES MELLITUS WITHOUT COMPLICATION, WITHOUT LONG-TERM CURRENT USE OF INSULIN: ICD-10-CM

## 2022-06-29 DIAGNOSIS — L97.929 VENOUS STASIS ULCER OF LEFT LOWER EXTREMITY: Primary | ICD-10-CM

## 2022-06-29 DIAGNOSIS — I83.029 VENOUS STASIS ULCER OF LEFT LOWER EXTREMITY: Primary | ICD-10-CM

## 2022-06-29 DIAGNOSIS — I89.0 LYMPHEDEMA OF BOTH LOWER EXTREMITIES: ICD-10-CM

## 2022-06-29 DIAGNOSIS — I83.029 VENOUS STASIS ULCER OF LEFT LOWER EXTREMITY: ICD-10-CM

## 2022-06-29 DIAGNOSIS — I10 PRIMARY HYPERTENSION: ICD-10-CM

## 2022-06-29 PROCEDURE — 1125F PR PAIN SEVERITY QUANTIFIED, PAIN PRESENT: ICD-10-PCS | Mod: CPTII,S$GLB,,

## 2022-06-29 PROCEDURE — 3072F LOW RISK FOR RETINOPATHY: CPT | Mod: CPTII,S$GLB,,

## 2022-06-29 PROCEDURE — 99999 PR PBB SHADOW E&M-EST. PATIENT-LVL IV: CPT | Mod: PBBFAC,,,

## 2022-06-29 PROCEDURE — 1160F RVW MEDS BY RX/DR IN RCRD: CPT | Mod: CPTII,S$GLB,,

## 2022-06-29 PROCEDURE — 3008F BODY MASS INDEX DOCD: CPT | Mod: CPTII,S$GLB,,

## 2022-06-29 PROCEDURE — 1125F AMNT PAIN NOTED PAIN PRSNT: CPT | Mod: CPTII,S$GLB,,

## 2022-06-29 PROCEDURE — 99999 PR PBB SHADOW E&M-EST. PATIENT-LVL IV: ICD-10-PCS | Mod: PBBFAC,,,

## 2022-06-29 PROCEDURE — 1159F MED LIST DOCD IN RCRD: CPT | Mod: CPTII,S$GLB,,

## 2022-06-29 PROCEDURE — 3066F PR DOCUMENTATION OF TREATMENT FOR NEPHROPATHY: ICD-10-PCS | Mod: CPTII,S$GLB,,

## 2022-06-29 PROCEDURE — 3072F PR LOW RISK FOR RETINOPATHY: ICD-10-PCS | Mod: CPTII,S$GLB,,

## 2022-06-29 PROCEDURE — 1160F PR REVIEW ALL MEDS BY PRESCRIBER/CLIN PHARMACIST DOCUMENTED: ICD-10-PCS | Mod: CPTII,S$GLB,,

## 2022-06-29 PROCEDURE — 1159F PR MEDICATION LIST DOCUMENTED IN MEDICAL RECORD: ICD-10-PCS | Mod: CPTII,S$GLB,,

## 2022-06-29 PROCEDURE — 97597 DBRDMT OPN WND 1ST 20 CM/<: CPT | Mod: S$GLB,,,

## 2022-06-29 PROCEDURE — 97598 DBRDMT OPN WND ADDL 20CM/<: CPT | Mod: S$GLB,,,

## 2022-06-29 PROCEDURE — 4010F PR ACE/ARB THEARPY RXD/TAKEN: ICD-10-PCS | Mod: CPTII,S$GLB,,

## 2022-06-29 PROCEDURE — 3051F PR MOST RECENT HEMOGLOBIN A1C LEVEL 7.0 - < 8.0%: ICD-10-PCS | Mod: CPTII,S$GLB,,

## 2022-06-29 PROCEDURE — 3051F HG A1C>EQUAL 7.0%<8.0%: CPT | Mod: CPTII,S$GLB,,

## 2022-06-29 PROCEDURE — 3061F NEG MICROALBUMINURIA REV: CPT | Mod: CPTII,S$GLB,,

## 2022-06-29 PROCEDURE — 97598 DEBRIDEMENT: ICD-10-PCS | Mod: S$GLB,,,

## 2022-06-29 PROCEDURE — 3078F PR MOST RECENT DIASTOLIC BLOOD PRESSURE < 80 MM HG: ICD-10-PCS | Mod: CPTII,S$GLB,,

## 2022-06-29 PROCEDURE — 1101F PR PT FALLS ASSESS DOC 0-1 FALLS W/OUT INJ PAST YR: ICD-10-PCS | Mod: CPTII,S$GLB,,

## 2022-06-29 PROCEDURE — 3074F PR MOST RECENT SYSTOLIC BLOOD PRESSURE < 130 MM HG: ICD-10-PCS | Mod: CPTII,S$GLB,,

## 2022-06-29 PROCEDURE — 3074F SYST BP LT 130 MM HG: CPT | Mod: CPTII,S$GLB,,

## 2022-06-29 PROCEDURE — 93970 EXTREMITY STUDY: CPT | Mod: 26,,, | Performed by: INTERNAL MEDICINE

## 2022-06-29 PROCEDURE — 3078F DIAST BP <80 MM HG: CPT | Mod: CPTII,S$GLB,,

## 2022-06-29 PROCEDURE — 4010F ACE/ARB THERAPY RXD/TAKEN: CPT | Mod: CPTII,S$GLB,,

## 2022-06-29 PROCEDURE — 93970 CV US DOPPLER VENOUS LEGS BILATERAL (CUPID ONLY): ICD-10-PCS | Mod: 26,,, | Performed by: INTERNAL MEDICINE

## 2022-06-29 PROCEDURE — 97597 DEBRIDEMENT: ICD-10-PCS | Mod: S$GLB,,,

## 2022-06-29 PROCEDURE — 93970 EXTREMITY STUDY: CPT | Mod: 50

## 2022-06-29 PROCEDURE — 3066F NEPHROPATHY DOC TX: CPT | Mod: CPTII,S$GLB,,

## 2022-06-29 PROCEDURE — 1101F PT FALLS ASSESS-DOCD LE1/YR: CPT | Mod: CPTII,S$GLB,,

## 2022-06-29 PROCEDURE — 3061F PR NEG MICROALBUMINURIA RESULT DOCUMENTED/REVIEW: ICD-10-PCS | Mod: CPTII,S$GLB,,

## 2022-06-29 PROCEDURE — 3008F PR BODY MASS INDEX (BMI) DOCUMENTED: ICD-10-PCS | Mod: CPTII,S$GLB,,

## 2022-06-29 PROCEDURE — 3288F PR FALLS RISK ASSESSMENT DOCUMENTED: ICD-10-PCS | Mod: CPTII,S$GLB,,

## 2022-06-29 PROCEDURE — 3288F FALL RISK ASSESSMENT DOCD: CPT | Mod: CPTII,S$GLB,,

## 2022-06-29 NOTE — PROCEDURES
"Debridement    Date/Time: 6/29/2022 2:00 PM  Performed by: Gracie Álvarez PA-C  Authorized by: Gracie Álvarez PA-C     Time out: Immediately prior to procedure a "time out" was called to verify the correct patient, procedure, equipment, support staff and site/side marked as required.    Consent Done?:  Yes (Written)  Local anesthesia used?: Yes    Local anesthetic:  Topical anesthetic (Topical 4% Lidocaine Hydrochloride)    Wound Details:    Location:  Left leg (anterior)    Type of Debridement:  Excisional       Length (cm):  6.4       Area (sq cm):  41.6       Width (cm):  6.5       Percent Debrided (%):  100       Depth (cm):  0       Total Area Debrided (sq cm):  41.6    Depth of debridement:  Epidermis/Dermis    Tissue debrided:  Dermis and Epidermis    Devitalized tissue debrided:  Biofilm, Callus, Exudate, Fibrin and Slough    Instruments:  Curette and Scissors    Additional wounds:  1    2nd Wound Details:     Location:  Left leg (posterior)    Type of Debridement:  Excisional       Length (cm):  4.9       Area (sq cm):  26.46       Width (cm):  5.4       Percent Debrided (%):  60       Depth (cm):  0       Total Area Debrided (sq cm):  15.88    Depth of debridement:  Epidermis/Dermis    Tissue debrided:  Dermis and Epidermis    Devitalized tissue debrided:  Biofilm, Callus, Exudate, Fibrin and Slough    Instruments:  Curette and Scissors    Bleeding:  Minimal  Hemostasis Achieved: Yes    Method Used:  Pressure  Patient tolerance:  Patient tolerated the procedure well with no immediate complications      "

## 2022-06-29 NOTE — PROGRESS NOTES
Subjective:       Patient ID: Jono Gonzalez is a 67 y.o. male.  With PMHx chronic venous insufficiency, BLE venous insufficiency, BLE venous insufficiency, DM2, HTN, HLD, DAVID (on CPAP), morbid obesity    Chief Complaint: Wound Check (Wound infection)    Patient presents with wife for reevaluation of 2 lower leg wounds. Patient reports that he completed his antibiotics on Sunday. Since then, he states his symptoms resolved. Denies fever, chills, erythema, warmth, drainage, or pain.    Patient reports feeling light-headed and dizzy. 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 Patient unsure if his blood sugar was low. Denies CP, SOB, LOC, hypotension, headache, and syncope.    Wound Check      Review of Systems   Constitutional: Negative for activity change, chills, diaphoresis, fatigue and fever.   Eyes: Negative for visual disturbance.   Respiratory: Negative for apnea, chest tightness and shortness of breath.    Cardiovascular: Positive for leg swelling. Negative for chest pain and palpitations.   Musculoskeletal: Negative for gait problem and joint swelling.   Skin: Positive for wound. Negative for pallor and rash.   Neurological: Positive for dizziness and light-headedness. Negative for syncope, weakness, numbness and headaches.   Psychiatric/Behavioral: Negative for agitation. The patient is not nervous/anxious.    All other systems reviewed and are negative.      Objective:      Physical Exam  Vitals reviewed.   Constitutional:       General: He is not in acute distress.     Appearance: Normal appearance.   Cardiovascular:      Rate and Rhythm: Normal rate and regular rhythm.      Pulses: Normal pulses.   Pulmonary:      Effort: No respiratory distress.   Musculoskeletal:         General: No swelling.      Right lower leg: Edema present.      Left lower leg: Edema present.   Skin:     General: Skin is warm and dry.      Capillary Refill: Capillary refill takes less than 2  seconds.      Findings: Wound present. No erythema.             Comments: No odor noted.   Neurological:      General: No focal deficit present.      Mental Status: He is alert and oriented to person, place, and time.   Psychiatric:         Mood and Affect: Mood normal.         Behavior: Behavior normal.         Thought Content: Thought content normal.         Judgment: Judgment normal.         Assessment:       1. Venous stasis ulcer of left lower extremity    2. Chronic venous insufficiency    3. Lymphedema of both lower extremities    4. Type 2 diabetes mellitus without complication, without long-term current use of insulin    5. Primary hypertension    6. BMI 45.0-49.9, adult           Wound Left anterior;lower Leg (Active)        Pre-existing:    Primary Wound Type:    Side: Left   Orientation: anterior;lower   Location: Leg   Wound Number:    Ankle-Brachial Index:    Pulses:    Removal Indication and Assessment:    Wound Outcome:    (Retired) Wound Type:    (Retired) Wound Length (cm):    (Retired) Wound Width (cm):    (Retired) Depth (cm):    Wound Description (Comments):    Removal Indications:    Wound Image    06/29/22 1454   Dressing Appearance Dry;Intact;Dried drainage 06/29/22 1454   Drainage Amount Small 06/29/22 1454   Drainage Characteristics/Odor Brown 06/29/22 1454   Red (%), Wound Tissue Color 60 % 06/29/22 1454   Yellow (%), Wound Tissue Color 40 % 06/29/22 1454   Periwound Area [place];Dry;Intact 06/29/22 1454   Wound Length (cm) 6.4 cm 06/29/22 1454   Wound Width (cm) 6.5 cm 06/29/22 1454   Wound Surface Area (cm^2) 41.6 cm^2 06/29/22 1454   Care Cleansed with:;Soap and water;Wound cleanser 06/29/22 1454   Dressing Applied;Hydrogel;Compression wrap 06/29/22 1454   Periwound Care Skin barrier film applied 06/29/22 1454   Compression Unna's Boot 06/29/22 1454            Wound Left posterior Calf (Active)        Pre-existing:    Primary Wound Type:    Side: Left   Orientation: posterior   Location:  Calf   Wound Number:    Ankle-Brachial Index:    Pulses:    Removal Indication and Assessment:    Wound Outcome:    (Retired) Wound Type:    (Retired) Wound Length (cm):    (Retired) Wound Width (cm):    (Retired) Depth (cm):    Wound Description (Comments):    Removal Indications:    Wound Image    06/29/22 1456   Dressing Appearance Dry;Intact;Dried drainage 06/29/22 1456   Drainage Amount Small 06/29/22 1456   Drainage Characteristics/Odor Brown;Creamy 06/29/22 1456   Red (%), Wound Tissue Color 60 % 06/29/22 1456   Yellow (%), Wound Tissue Color 40 % 06/29/22 1456   Periwound Area Dry;Intact 06/29/22 1456   Wound Length (cm) 4.9 cm 06/29/22 1456   Wound Width (cm) 5.4 cm 06/29/22 1456   Wound Depth (cm) 0 cm 06/29/22 1456   Wound Volume (cm^3) 0 cm^3 06/29/22 1456   Wound Surface Area (cm^2) 26.46 cm^2 06/29/22 1456   Care Cleansed with:;Soap and water;Wound cleanser 06/29/22 1456   Dressing Applied;Hydrogel;Compression wrap 06/29/22 1456   Compression Unna's Boot 06/29/22 1456     Mr. Gonzalez was seen in wound care clinic today, placed is sitting position with legs elevated in treatment chair.  Unna boot dressing removed before visit today by ultrasound tech;  Alginate dressing removed and left leg washed with Easi-cleanse sponge and dried thoroughly.  Evaluated and treated wounds - Sharp debridement with 5mm curette perform- Patient tolerated well.      Plan of care:  Calmoseptine barrier cream to robert-wound skin, hydrogel to wound bed with a  three layer zinc oxide unna boot.     Patient's foot positioned at a 90 degree angle.  Zinc oxide unna boot followed by kerlix roll gauze and coban cohesive outer layer applied using a spiral technique avoiding creases of folds. Each layer was started behind the first metatarsal and ended below the tibial tubercle knee. There was overlap of each turn half the width of the previous turn.  Return to clinic in one week.             Plan:     Discussed light-headed and  presyncope with patient. Instructed patient to follow up with PCP, check blood sugar and blood pressure, and to not stay in home with chemical fumes. Blood pressure WNL today. ED precautions given.    Left lower extremity dressed as detailed above  Patient instructed to elevated legs while sitting  Pt instructed not to get dressing wet and to use a cast cover for showering   If dressing should become wet, unroll each layer from the top going down, shower, irrigate for 10 minutes while in shower, pat dry and apply a wet-to-dry dressing over open wounds covered with roll gauze and secured with paper tape and two ace wrap bandages for compression  Notify this office immediately to have new dressing applied    Verbal and written instructions given to patient   Return to clinic in 1 week       Gracie Álvarez PA-C

## 2022-07-04 LAB — FUNGUS SPEC CULT: NORMAL

## 2022-07-06 ENCOUNTER — OFFICE VISIT (OUTPATIENT)
Dept: WOUND CARE | Facility: CLINIC | Age: 67
End: 2022-07-06
Payer: COMMERCIAL

## 2022-07-06 VITALS
SYSTOLIC BLOOD PRESSURE: 138 MMHG | BODY MASS INDEX: 48.34 KG/M2 | DIASTOLIC BLOOD PRESSURE: 82 MMHG | RESPIRATION RATE: 18 BRPM | HEART RATE: 95 BPM | OXYGEN SATURATION: 95 % | HEIGHT: 67 IN | WEIGHT: 308 LBS

## 2022-07-06 DIAGNOSIS — I87.2 CHRONIC VENOUS INSUFFICIENCY: ICD-10-CM

## 2022-07-06 DIAGNOSIS — L97.929 VENOUS STASIS ULCER OF LEFT LOWER EXTREMITY: Primary | ICD-10-CM

## 2022-07-06 DIAGNOSIS — I89.0 LYMPHEDEMA OF BOTH LOWER EXTREMITIES: ICD-10-CM

## 2022-07-06 DIAGNOSIS — I10 PRIMARY HYPERTENSION: ICD-10-CM

## 2022-07-06 DIAGNOSIS — I83.029 VENOUS STASIS ULCER OF LEFT LOWER EXTREMITY: Primary | ICD-10-CM

## 2022-07-06 DIAGNOSIS — E11.9 TYPE 2 DIABETES MELLITUS WITHOUT COMPLICATION, WITHOUT LONG-TERM CURRENT USE OF INSULIN: ICD-10-CM

## 2022-07-06 PROCEDURE — 3061F PR NEG MICROALBUMINURIA RESULT DOCUMENTED/REVIEW: ICD-10-PCS | Mod: CPTII,S$GLB,,

## 2022-07-06 PROCEDURE — 3066F PR DOCUMENTATION OF TREATMENT FOR NEPHROPATHY: ICD-10-PCS | Mod: CPTII,S$GLB,,

## 2022-07-06 PROCEDURE — 3072F PR LOW RISK FOR RETINOPATHY: ICD-10-PCS | Mod: CPTII,S$GLB,,

## 2022-07-06 PROCEDURE — 3008F PR BODY MASS INDEX (BMI) DOCUMENTED: ICD-10-PCS | Mod: CPTII,S$GLB,,

## 2022-07-06 PROCEDURE — 1159F MED LIST DOCD IN RCRD: CPT | Mod: CPTII,S$GLB,,

## 2022-07-06 PROCEDURE — 1160F PR REVIEW ALL MEDS BY PRESCRIBER/CLIN PHARMACIST DOCUMENTED: ICD-10-PCS | Mod: CPTII,S$GLB,,

## 2022-07-06 PROCEDURE — 3288F PR FALLS RISK ASSESSMENT DOCUMENTED: ICD-10-PCS | Mod: CPTII,S$GLB,,

## 2022-07-06 PROCEDURE — 3079F PR MOST RECENT DIASTOLIC BLOOD PRESSURE 80-89 MM HG: ICD-10-PCS | Mod: CPTII,S$GLB,,

## 2022-07-06 PROCEDURE — 3051F PR MOST RECENT HEMOGLOBIN A1C LEVEL 7.0 - < 8.0%: ICD-10-PCS | Mod: CPTII,S$GLB,,

## 2022-07-06 PROCEDURE — 1159F PR MEDICATION LIST DOCUMENTED IN MEDICAL RECORD: ICD-10-PCS | Mod: CPTII,S$GLB,,

## 2022-07-06 PROCEDURE — 3051F HG A1C>EQUAL 7.0%<8.0%: CPT | Mod: CPTII,S$GLB,,

## 2022-07-06 PROCEDURE — 3066F NEPHROPATHY DOC TX: CPT | Mod: CPTII,S$GLB,,

## 2022-07-06 PROCEDURE — 1125F AMNT PAIN NOTED PAIN PRSNT: CPT | Mod: CPTII,S$GLB,,

## 2022-07-06 PROCEDURE — 1160F RVW MEDS BY RX/DR IN RCRD: CPT | Mod: CPTII,S$GLB,,

## 2022-07-06 PROCEDURE — 3008F BODY MASS INDEX DOCD: CPT | Mod: CPTII,S$GLB,,

## 2022-07-06 PROCEDURE — 97597 DEBRIDEMENT: ICD-10-PCS | Mod: LT,S$GLB,,

## 2022-07-06 PROCEDURE — 97597 DBRDMT OPN WND 1ST 20 CM/<: CPT | Mod: LT,S$GLB,,

## 2022-07-06 PROCEDURE — 1125F PR PAIN SEVERITY QUANTIFIED, PAIN PRESENT: ICD-10-PCS | Mod: CPTII,S$GLB,,

## 2022-07-06 PROCEDURE — 3072F LOW RISK FOR RETINOPATHY: CPT | Mod: CPTII,S$GLB,,

## 2022-07-06 PROCEDURE — 99999 PR PBB SHADOW E&M-EST. PATIENT-LVL IV: ICD-10-PCS | Mod: PBBFAC,,,

## 2022-07-06 PROCEDURE — 1101F PT FALLS ASSESS-DOCD LE1/YR: CPT | Mod: CPTII,S$GLB,,

## 2022-07-06 PROCEDURE — 3075F PR MOST RECENT SYSTOLIC BLOOD PRESS GE 130-139MM HG: ICD-10-PCS | Mod: CPTII,S$GLB,,

## 2022-07-06 PROCEDURE — 4010F ACE/ARB THERAPY RXD/TAKEN: CPT | Mod: CPTII,S$GLB,,

## 2022-07-06 PROCEDURE — 1101F PR PT FALLS ASSESS DOC 0-1 FALLS W/OUT INJ PAST YR: ICD-10-PCS | Mod: CPTII,S$GLB,,

## 2022-07-06 PROCEDURE — 3079F DIAST BP 80-89 MM HG: CPT | Mod: CPTII,S$GLB,,

## 2022-07-06 PROCEDURE — 4010F PR ACE/ARB THEARPY RXD/TAKEN: ICD-10-PCS | Mod: CPTII,S$GLB,,

## 2022-07-06 PROCEDURE — 3061F NEG MICROALBUMINURIA REV: CPT | Mod: CPTII,S$GLB,,

## 2022-07-06 PROCEDURE — 3075F SYST BP GE 130 - 139MM HG: CPT | Mod: CPTII,S$GLB,,

## 2022-07-06 PROCEDURE — 3288F FALL RISK ASSESSMENT DOCD: CPT | Mod: CPTII,S$GLB,,

## 2022-07-06 PROCEDURE — 99999 PR PBB SHADOW E&M-EST. PATIENT-LVL IV: CPT | Mod: PBBFAC,,,

## 2022-07-06 RX ORDER — HYDROCODONE BITARTRATE AND ACETAMINOPHEN 10; 325 MG/1; MG/1
1 TABLET ORAL
COMMUNITY
Start: 2022-06-23

## 2022-07-06 NOTE — PROCEDURES
"Debridement    Date/Time: 7/6/2022 1:00 PM  Performed by: Gracie Álvarez PA-C  Authorized by: Gracie Álvarez PA-C     Time out: Immediately prior to procedure a "time out" was called to verify the correct patient, procedure, equipment, support staff and site/side marked as required.    Consent Done?:  Yes (Written)  Local anesthesia used?: No      Wound Details:    Location:  Left leg (Anterior)    Type of Debridement:  Excisional       Length (cm):  1       Area (sq cm):  0.3       Width (cm):  0.3       Percent Debrided (%):  100       Depth (cm):  0       Total Area Debrided (sq cm):  0.3    Depth of debridement:  Epidermis/Dermis    Tissue debrided:  Dermis and Epidermis    Devitalized tissue debrided:  Biofilm    Instruments:  Scissors and Other (Pickups)    Additional wounds:  2    2nd Wound Details:     Location:  Left leg (posterior medial)    Type of Debridement:  Excisional       Length (cm):  3       Area (sq cm):  8.7       Width (cm):  2.9       Percent Debrided (%):  100       Depth (cm):  0       Total Area Debrided (sq cm):  8.7    Depth of debridement:  Epidermis/Dermis    Tissue debrided:  Dermis and Epidermis    Devitalized tissue debrided:  Biofilm and Other    Instruments:  Scissors (Pickups)    3rd Wound Details:     Location:  Left leg (posterior)    Type of Debridement:  Excisional       Length (cm):  2.9       Area (sq cm):  4.06       Width (cm):  1.4       Percent Debrided (%):  100       Depth (cm):  0       Total Area Debrided (sq cm):  4.06    Depth of debridement:  Epidermis/Dermis    Tissue debrided:  Epidermis    Devitalized tissue debrided:  Biofilm    Instruments:  Scissors and Other (Pickups)    Bleeding:  Minimal  Hemostasis Achieved: Yes    Method Used:  Pressure  Patient tolerance:  Patient tolerated the procedure well with no immediate complications      "

## 2022-07-06 NOTE — PROGRESS NOTES
Subjective:       Patient ID: Jono Gonzalez is a 67 y.o. male.  With PMHx chronic venous insufficiency, BLE venous insufficiency, BLE venous insufficiency, DM2, HTN, HLD, DAVID (on CPAP), morbid obesity    Chief Complaint: No chief complaint on file.    Patient presents with wife for reevaluation of 2 lower leg wounds. No complaints at this time. Denies fever, chills, erythema, warmth, drainage, or pain.    Wound Check      Review of Systems   Constitutional: Negative for activity change, chills, diaphoresis, fatigue and fever.   Eyes: Negative for visual disturbance.   Respiratory: Negative for apnea, chest tightness and shortness of breath.    Cardiovascular: Positive for leg swelling. Negative for chest pain and palpitations.   Musculoskeletal: Negative for gait problem and joint swelling.   Skin: Positive for wound. Negative for pallor and rash.   Neurological: Negative for dizziness, syncope, weakness, light-headedness, numbness and headaches.   Psychiatric/Behavioral: Negative for agitation. The patient is not nervous/anxious.    All other systems reviewed and are negative.      Objective:      Physical Exam  Vitals reviewed.   Constitutional:       General: He is not in acute distress.     Appearance: Normal appearance.   Cardiovascular:      Rate and Rhythm: Normal rate and regular rhythm.      Pulses: Normal pulses.   Pulmonary:      Effort: No respiratory distress.   Musculoskeletal:         General: No swelling.      Right lower leg: Edema present.      Left lower leg: Edema present.   Skin:     General: Skin is warm and dry.      Capillary Refill: Capillary refill takes less than 2 seconds.      Findings: Wound present. No erythema.          Neurological:      General: No focal deficit present.      Mental Status: He is alert and oriented to person, place, and time.   Psychiatric:         Mood and Affect: Mood normal.         Behavior: Behavior normal.         Thought Content: Thought content normal.          Judgment: Judgment normal.         Assessment:       1. Venous stasis ulcer of left lower extremity           Wound Left anterior;lower Leg (Active)        Pre-existing:    Primary Wound Type:    Side: Left   Orientation: anterior;lower   Location: Leg   Wound Number:    Ankle-Brachial Index:    Pulses:    Removal Indication and Assessment:    Wound Outcome:    (Retired) Wound Type:    (Retired) Wound Length (cm):    (Retired) Wound Width (cm):    (Retired) Depth (cm):    Wound Description (Comments):    Removal Indications:    Wound Image    07/06/22 1345   Dressing Appearance Dry;Intact;Clean;Dried drainage 07/06/22 1345   Drainage Amount Small 07/06/22 1345   Drainage Characteristics/Odor Serosanguineous 07/06/22 1345   Red (%), Wound Tissue Color 50 % 07/06/22 1345   Yellow (%), Wound Tissue Color 50 % 07/06/22 1345   Periwound Area Intact;[place] 07/06/22 1345   Wound Length (cm) 1 cm 07/06/22 1345   Wound Width (cm) 0.3 cm 07/06/22 1345   Wound Depth (cm) 0 cm 07/06/22 1345   Wound Volume (cm^3) 0 cm^3 07/06/22 1345   Wound Surface Area (cm^2) 0.3 cm^2 07/06/22 1345   Care Soap and water;Wound cleanser 07/06/22 1345   Dressing Applied;Hydrogel;Compression wrap 07/06/22 1345   Periwound Care Skin barrier film applied 07/06/22 1345   Compression Unna's Boot 07/06/22 1345            Wound Left posterior Calf (Active)        Pre-existing:    Primary Wound Type:    Side: Left   Orientation: posterior   Location: Calf   Wound Number:    Ankle-Brachial Index:    Pulses:    Removal Indication and Assessment:    Wound Outcome:    (Retired) Wound Type:    (Retired) Wound Length (cm):    (Retired) Wound Width (cm):    (Retired) Depth (cm):    Wound Description (Comments):    Removal Indications:    Wound Image    07/06/22 1345   Dressing Appearance Dry;Intact;Clean;Dried drainage 07/06/22 1345   Drainage Amount Small 07/06/22 1345   Drainage Characteristics/Odor Serosanguineous 07/06/22 1345   Red (%), Wound Tissue Color  50 % 07/06/22 1345   Yellow (%), Wound Tissue Color 50 % 07/06/22 1345   Periwound Area Intact;[place] 07/06/22 1345   Wound Length (cm) 2.9 cm 07/06/22 1345   Wound Width (cm) 1.4 cm 07/06/22 1345   Wound Depth (cm) 0 cm 07/06/22 1345   Wound Volume (cm^3) 0 cm^3 07/06/22 1345   Wound Surface Area (cm^2) 4.06 cm^2 07/06/22 1345   Care Cleansed with:;Soap and water;Wound cleanser 07/06/22 1345   Dressing Applied;Hydrogel;Compression wrap 07/06/22 1345   Periwound Care Skin barrier film applied 07/06/22 1345   Compression Unna's Boot 07/06/22 1345            Wound Left posterior;medial Calf (Active)        Pre-existing:    Primary Wound Type:    Side: Left   Orientation: posterior;medial   Location: Calf   Wound Number:    Ankle-Brachial Index:    Pulses:    Removal Indication and Assessment:    Wound Outcome:    (Retired) Wound Type:    (Retired) Wound Length (cm):    (Retired) Wound Width (cm):    (Retired) Depth (cm):    Wound Description (Comments):    Removal Indications:    Wound Image    07/06/22 1345   Dressing Appearance Dry;Intact;Clean;Dried drainage 07/06/22 1345   Drainage Amount Small 07/06/22 1345   Drainage Characteristics/Odor Serosanguineous 07/06/22 1345   Red (%), Wound Tissue Color 50 % 07/06/22 1345   Yellow (%), Wound Tissue Color 50 % 07/06/22 1345   Periwound Area Intact;[place] 07/06/22 1345   Wound Length (cm) 3 cm 07/06/22 1345   Wound Width (cm) 2.9 cm 07/06/22 1345   Wound Depth (cm) 0 cm 07/06/22 1345   Wound Volume (cm^3) 0 cm^3 07/06/22 1345   Wound Surface Area (cm^2) 8.7 cm^2 07/06/22 1345   Care Cleansed with:;Soap and water;Wound cleanser 07/06/22 1345   Dressing Applied;Hydrogel;Compression wrap 07/06/22 1345   Periwound Care Skin barrier film applied 07/06/22 1345   Compression Unna's Boot 07/06/22 1345     Mr. Gonzalez was seen in wound care clinic today, placed is sitting position with legs elevated in treatment chair.  Unna boot dressing removed and left leg washed with Easi-cleanse  sponge and dried thoroughly.  Evaluated and treated wounds - Sharp debridement with scissors and pickups performed- Patient tolerated well.      Plan of care:  Calmoseptine barrier cream to robert-wound skin, hydrogel to wound bed with a  three layer zinc oxide unna boot.     Patient's foot positioned at a 90 degree angle.  Zinc oxide unna boot followed by kerlix roll gauze and coban cohesive outer layer applied using a spiral technique avoiding creases of folds. Each layer was started behind the first metatarsal and ended below the tibial tubercle knee. There was overlap of each turn half the width of the previous turn.  Return to clinic in one week.             Plan:     Discussed offloading posterior wound with patient. Showed patient how to place a pillow behind his knee and below his foot to decrease pressure on wound. Discussed how constant pressure on wound decreases healing.    Left lower extremity dressed as detailed above  Patient instructed to elevated legs while sitting  Pt instructed not to get dressing wet and to use a cast cover for showering   If dressing should become wet, unroll each layer from the top going down, shower, irrigate for 10 minutes while in shower, pat dry and apply a wet-to-dry dressing over open wounds covered with roll gauze and secured with paper tape and two ace wrap bandages for compression  Notify this office immediately to have new dressing applied    Verbal and written instructions given to patient   Return to clinic in 1 week       Gracie Álvarez PA-C

## 2022-07-13 ENCOUNTER — OFFICE VISIT (OUTPATIENT)
Dept: WOUND CARE | Facility: CLINIC | Age: 67
End: 2022-07-13
Payer: COMMERCIAL

## 2022-07-13 VITALS
BODY MASS INDEX: 48.03 KG/M2 | RESPIRATION RATE: 18 BRPM | OXYGEN SATURATION: 94 % | WEIGHT: 306 LBS | SYSTOLIC BLOOD PRESSURE: 129 MMHG | HEIGHT: 67 IN | HEART RATE: 94 BPM | DIASTOLIC BLOOD PRESSURE: 66 MMHG

## 2022-07-13 DIAGNOSIS — I89.0 LYMPHEDEMA OF BOTH LOWER EXTREMITIES: ICD-10-CM

## 2022-07-13 DIAGNOSIS — L97.929 VENOUS STASIS ULCER OF LEFT LOWER EXTREMITY: Primary | ICD-10-CM

## 2022-07-13 DIAGNOSIS — I87.2 CHRONIC VENOUS INSUFFICIENCY: ICD-10-CM

## 2022-07-13 DIAGNOSIS — I83.029 VENOUS STASIS ULCER OF LEFT LOWER EXTREMITY: Primary | ICD-10-CM

## 2022-07-13 DIAGNOSIS — I10 PRIMARY HYPERTENSION: ICD-10-CM

## 2022-07-13 DIAGNOSIS — E11.9 TYPE 2 DIABETES MELLITUS WITHOUT COMPLICATION, WITHOUT LONG-TERM CURRENT USE OF INSULIN: ICD-10-CM

## 2022-07-13 PROCEDURE — 1125F PR PAIN SEVERITY QUANTIFIED, PAIN PRESENT: ICD-10-PCS | Mod: CPTII,S$GLB,,

## 2022-07-13 PROCEDURE — 97597 DBRDMT OPN WND 1ST 20 CM/<: CPT | Mod: S$GLB,,,

## 2022-07-13 PROCEDURE — 3066F PR DOCUMENTATION OF TREATMENT FOR NEPHROPATHY: ICD-10-PCS | Mod: CPTII,S$GLB,,

## 2022-07-13 PROCEDURE — 3078F DIAST BP <80 MM HG: CPT | Mod: CPTII,S$GLB,,

## 2022-07-13 PROCEDURE — 3008F PR BODY MASS INDEX (BMI) DOCUMENTED: ICD-10-PCS | Mod: CPTII,S$GLB,,

## 2022-07-13 PROCEDURE — 3061F PR NEG MICROALBUMINURIA RESULT DOCUMENTED/REVIEW: ICD-10-PCS | Mod: CPTII,S$GLB,,

## 2022-07-13 PROCEDURE — 3074F SYST BP LT 130 MM HG: CPT | Mod: CPTII,S$GLB,,

## 2022-07-13 PROCEDURE — 3051F HG A1C>EQUAL 7.0%<8.0%: CPT | Mod: CPTII,S$GLB,,

## 2022-07-13 PROCEDURE — 97597 DEBRIDEMENT: ICD-10-PCS | Mod: S$GLB,,,

## 2022-07-13 PROCEDURE — 99499 UNLISTED E&M SERVICE: CPT | Mod: S$GLB,,,

## 2022-07-13 PROCEDURE — 1159F PR MEDICATION LIST DOCUMENTED IN MEDICAL RECORD: ICD-10-PCS | Mod: CPTII,S$GLB,,

## 2022-07-13 PROCEDURE — 3051F PR MOST RECENT HEMOGLOBIN A1C LEVEL 7.0 - < 8.0%: ICD-10-PCS | Mod: CPTII,S$GLB,,

## 2022-07-13 PROCEDURE — 1101F PR PT FALLS ASSESS DOC 0-1 FALLS W/OUT INJ PAST YR: ICD-10-PCS | Mod: CPTII,S$GLB,,

## 2022-07-13 PROCEDURE — 99999 PR PBB SHADOW E&M-EST. PATIENT-LVL IV: ICD-10-PCS | Mod: PBBFAC,,,

## 2022-07-13 PROCEDURE — 3288F FALL RISK ASSESSMENT DOCD: CPT | Mod: CPTII,S$GLB,,

## 2022-07-13 PROCEDURE — 3078F PR MOST RECENT DIASTOLIC BLOOD PRESSURE < 80 MM HG: ICD-10-PCS | Mod: CPTII,S$GLB,,

## 2022-07-13 PROCEDURE — 1159F MED LIST DOCD IN RCRD: CPT | Mod: CPTII,S$GLB,,

## 2022-07-13 PROCEDURE — 3061F NEG MICROALBUMINURIA REV: CPT | Mod: CPTII,S$GLB,,

## 2022-07-13 PROCEDURE — 3008F BODY MASS INDEX DOCD: CPT | Mod: CPTII,S$GLB,,

## 2022-07-13 PROCEDURE — 4010F ACE/ARB THERAPY RXD/TAKEN: CPT | Mod: CPTII,S$GLB,,

## 2022-07-13 PROCEDURE — 3074F PR MOST RECENT SYSTOLIC BLOOD PRESSURE < 130 MM HG: ICD-10-PCS | Mod: CPTII,S$GLB,,

## 2022-07-13 PROCEDURE — 3072F LOW RISK FOR RETINOPATHY: CPT | Mod: CPTII,S$GLB,,

## 2022-07-13 PROCEDURE — 3072F PR LOW RISK FOR RETINOPATHY: ICD-10-PCS | Mod: CPTII,S$GLB,,

## 2022-07-13 PROCEDURE — 99999 PR PBB SHADOW E&M-EST. PATIENT-LVL IV: CPT | Mod: PBBFAC,,,

## 2022-07-13 PROCEDURE — 1125F AMNT PAIN NOTED PAIN PRSNT: CPT | Mod: CPTII,S$GLB,,

## 2022-07-13 PROCEDURE — 99499 NO LOS: ICD-10-PCS | Mod: S$GLB,,,

## 2022-07-13 PROCEDURE — 4010F PR ACE/ARB THEARPY RXD/TAKEN: ICD-10-PCS | Mod: CPTII,S$GLB,,

## 2022-07-13 PROCEDURE — 3288F PR FALLS RISK ASSESSMENT DOCUMENTED: ICD-10-PCS | Mod: CPTII,S$GLB,,

## 2022-07-13 PROCEDURE — 3066F NEPHROPATHY DOC TX: CPT | Mod: CPTII,S$GLB,,

## 2022-07-13 PROCEDURE — 1101F PT FALLS ASSESS-DOCD LE1/YR: CPT | Mod: CPTII,S$GLB,,

## 2022-07-13 NOTE — PROCEDURES
"Debridement    Date/Time: 7/13/2022 1:30 PM  Performed by: Gracie Álvarez PA-C  Authorized by: Gracie Álvarez PA-C     Time out: Immediately prior to procedure a "time out" was called to verify the correct patient, procedure, equipment, support staff and site/side marked as required.    Consent Done?:  Yes (Written)  Local anesthesia used?: No      Wound Details:    Location:  Left leg (posterior)    Type of Debridement:  Excisional       Length (cm):  1.5       Area (sq cm):  1.5       Width (cm):  1       Percent Debrided (%):  100       Depth (cm):  0       Total Area Debrided (sq cm):  1.5    Depth of debridement:  Epidermis/Dermis    Tissue debrided:  Dermis and Epidermis    Devitalized tissue debrided:  Biofilm    Instruments:  Curette    Additional wounds:  1    2nd Wound Details:     Location:  Left leg (anterior)    Type of Debridement:  Excisional       Length (cm):  0.3       Area (sq cm):  0.15       Width (cm):  0.5       Percent Debrided (%):  100       Depth (cm):  0       Total Area Debrided (sq cm):  0.15    Depth of debridement:  Epidermis/Dermis    Tissue debrided:  Dermis and Epidermis    Devitalized tissue debrided:  Biofilm    Instruments:  Curette    Bleeding:  None  Patient tolerance:  Patient tolerated the procedure well with no immediate complications      "

## 2022-07-13 NOTE — PROGRESS NOTES
Subjective:       Patient ID: Jono Gonzalez is a 67 y.o. male.  With PMHx chronic venous insufficiency, BLE venous insufficiency, BLE venous insufficiency, DM2, HTN, HLD, DAVID (on CPAP), morbid obesity    Chief Complaint: No chief complaint on file.    Patient presents with wife for reevaluation of 2 lower leg wounds. No complaints at this time. Denies fever, chills, erythema, warmth, drainage, or pain.    Wound Check      Review of Systems   Constitutional: Negative for activity change, chills, diaphoresis, fatigue and fever.   Eyes: Negative for visual disturbance.   Respiratory: Negative for apnea, chest tightness and shortness of breath.    Cardiovascular: Positive for leg swelling. Negative for chest pain and palpitations.   Musculoskeletal: Negative for gait problem and joint swelling.   Skin: Positive for wound. Negative for pallor and rash.   Neurological: Negative for dizziness, syncope, weakness, light-headedness, numbness and headaches.   Psychiatric/Behavioral: Negative for agitation. The patient is not nervous/anxious.    All other systems reviewed and are negative.      Objective:      Physical Exam  Vitals reviewed.   Constitutional:       General: He is not in acute distress.     Appearance: Normal appearance.   Cardiovascular:      Rate and Rhythm: Normal rate and regular rhythm.      Pulses: Normal pulses.   Pulmonary:      Effort: No respiratory distress.   Musculoskeletal:         General: No swelling.      Right lower leg: Edema present.      Left lower leg: Edema present.   Skin:     General: Skin is warm and dry.      Capillary Refill: Capillary refill takes less than 2 seconds.      Findings: Wound present. No erythema.          Neurological:      General: No focal deficit present.      Mental Status: He is alert and oriented to person, place, and time.   Psychiatric:         Mood and Affect: Mood normal.         Behavior: Behavior normal.         Thought Content: Thought content normal.          Judgment: Judgment normal.         Assessment:       1. Venous stasis ulcer of left lower extremity    2. Chronic venous insufficiency    3. Lymphedema of both lower extremities    4. Type 2 diabetes mellitus without complication, without long-term current use of insulin    5. Primary hypertension    6. BMI 45.0-49.9, adult           Wound Left anterior;lower Leg (Active)        Pre-existing:    Primary Wound Type:    Side: Left   Orientation: anterior;lower   Location: Leg   Wound Number:    Ankle-Brachial Index:    Pulses:    Removal Indication and Assessment:    Wound Outcome:    (Retired) Wound Type:    (Retired) Wound Length (cm):    (Retired) Wound Width (cm):    (Retired) Depth (cm):    Wound Description (Comments):    Removal Indications:    Wound Image   07/13/22 1423   Dressing Appearance Dry;Intact;Clean 07/13/22 1423   Drainage Amount None 07/13/22 1423   Red (%), Wound Tissue Color 50 % 07/13/22 1423   Yellow (%), Wound Tissue Color 50 % 07/13/22 1423   Periwound Area Intact;[place] 07/13/22 1423   Wound Length (cm) 0.3 cm 07/13/22 1423   Wound Width (cm) 0.5 cm 07/13/22 1423   Wound Surface Area (cm^2) 0.15 cm^2 07/13/22 1423   Care Cleansed with:;Soap and water;Wound cleanser 07/13/22 1423   Dressing Applied;Hydrogel;Compression wrap 07/13/22 1423   Periwound Care Skin barrier film applied 07/13/22 1423   Compression Unna's Boot;Two layer compression 07/13/22 1423            Wound Left posterior Calf (Active)        Pre-existing:    Primary Wound Type:    Side: Left   Orientation: posterior   Location: Calf   Wound Number:    Ankle-Brachial Index:    Pulses:    Removal Indication and Assessment:    Wound Outcome:    (Retired) Wound Type:    (Retired) Wound Length (cm):    (Retired) Wound Width (cm):    (Retired) Depth (cm):    Wound Description (Comments):    Removal Indications:    Wound Image   07/13/22 1423   Dressing Appearance Dry;Intact;Clean 07/13/22 1423   Drainage Amount None 07/13/22 1423    Red (%), Wound Tissue Color 50 % 07/13/22 1423   Yellow (%), Wound Tissue Color 50 % 07/13/22 1423   Periwound Area Intact;[place] 07/13/22 1423   Wound Length (cm) 1.5 cm 07/13/22 1423   Wound Width (cm) 1 cm 07/13/22 1423   Wound Depth (cm) 0 cm 07/13/22 1423   Wound Volume (cm^3) 0 cm^3 07/13/22 1423   Wound Surface Area (cm^2) 1.5 cm^2 07/13/22 1423   Care Cleansed with:;Soap and water;Wound cleanser 07/13/22 1423   Dressing Applied;Hydrogel;Compression wrap 07/13/22 1423   Periwound Care Skin barrier film applied 07/13/22 1423   Compression Unna's Boot;Two layer compression 07/13/22 1423     Mr. Gonzalez was seen in wound care clinic today, placed is sitting position with legs elevated in treatment chair.  Unna boot dressing removed and left leg washed with Easi-cleanse sponge and dried thoroughly.  Evaluated and treated wounds - Sharp debridement with 5 mm curette- Patient tolerated well.      Plan of care:  Calmoseptine barrier cream to robert-wound skin, hydrogel to wound bed with a twolayer zinc oxide unna boot. Patient's foot positioned at a 90 degree angle.  Zinc oxide unna boot followed by kerlix roll gauze and coban cohesive outer layer applied using a spiral technique avoiding creases of folds. Each layer was started behind the first metatarsal and ended below the tibial tubercle knee. There was overlap of each turn half the width of the previous turn.  Return to clinic in one week.     Plan:     Discussed offloading posterior wound with patient. Showed patient how to place a pillow behind his knee and below his foot to decrease pressure on wound. Discussed how constant pressure on wound decreases healing.    Left lower extremity dressed as detailed above  Patient instructed to elevated legs while sitting  Pt instructed not to get dressing wet and to use a cast cover for showering   If dressing should become wet, unroll each layer from the top going down, shower, irrigate for 10 minutes while in shower, pat  dry and apply a wet-to-dry dressing over open wounds covered with roll gauze and secured with paper tape and two ace wrap bandages for compression  Notify this office immediately to have new dressing applied    Verbal and written instructions given to patient   Return to clinic in 1 week       Gracie Álvarez PA-C

## 2022-07-19 LAB — FUNGUS SPEC CULT: NORMAL

## 2022-07-20 ENCOUNTER — OFFICE VISIT (OUTPATIENT)
Dept: WOUND CARE | Facility: CLINIC | Age: 67
End: 2022-07-20
Payer: COMMERCIAL

## 2022-07-20 VITALS
HEART RATE: 102 BPM | BODY MASS INDEX: 45.61 KG/M2 | OXYGEN SATURATION: 97 % | HEIGHT: 67 IN | DIASTOLIC BLOOD PRESSURE: 76 MMHG | SYSTOLIC BLOOD PRESSURE: 146 MMHG | RESPIRATION RATE: 18 BRPM | WEIGHT: 290.56 LBS

## 2022-07-20 DIAGNOSIS — I87.2 CHRONIC VENOUS INSUFFICIENCY: ICD-10-CM

## 2022-07-20 DIAGNOSIS — I89.0 LYMPHEDEMA OF BOTH LOWER EXTREMITIES: ICD-10-CM

## 2022-07-20 DIAGNOSIS — L97.929 VENOUS STASIS ULCER OF LEFT LOWER EXTREMITY: Primary | ICD-10-CM

## 2022-07-20 DIAGNOSIS — E11.9 TYPE 2 DIABETES MELLITUS WITHOUT COMPLICATION, WITHOUT LONG-TERM CURRENT USE OF INSULIN: ICD-10-CM

## 2022-07-20 DIAGNOSIS — I83.029 VENOUS STASIS ULCER OF LEFT LOWER EXTREMITY: Primary | ICD-10-CM

## 2022-07-20 DIAGNOSIS — I10 PRIMARY HYPERTENSION: ICD-10-CM

## 2022-07-20 PROCEDURE — 1101F PR PT FALLS ASSESS DOC 0-1 FALLS W/OUT INJ PAST YR: ICD-10-PCS | Mod: CPTII,S$GLB,,

## 2022-07-20 PROCEDURE — 1159F PR MEDICATION LIST DOCUMENTED IN MEDICAL RECORD: ICD-10-PCS | Mod: CPTII,S$GLB,,

## 2022-07-20 PROCEDURE — 99499 UNLISTED E&M SERVICE: CPT | Mod: S$GLB,,,

## 2022-07-20 PROCEDURE — 3066F NEPHROPATHY DOC TX: CPT | Mod: CPTII,S$GLB,,

## 2022-07-20 PROCEDURE — 3078F PR MOST RECENT DIASTOLIC BLOOD PRESSURE < 80 MM HG: ICD-10-PCS | Mod: CPTII,S$GLB,,

## 2022-07-20 PROCEDURE — 99999 PR PBB SHADOW E&M-EST. PATIENT-LVL IV: ICD-10-PCS | Mod: PBBFAC,,,

## 2022-07-20 PROCEDURE — 3051F PR MOST RECENT HEMOGLOBIN A1C LEVEL 7.0 - < 8.0%: ICD-10-PCS | Mod: CPTII,S$GLB,,

## 2022-07-20 PROCEDURE — 1159F MED LIST DOCD IN RCRD: CPT | Mod: CPTII,S$GLB,,

## 2022-07-20 PROCEDURE — 3066F PR DOCUMENTATION OF TREATMENT FOR NEPHROPATHY: ICD-10-PCS | Mod: CPTII,S$GLB,,

## 2022-07-20 PROCEDURE — 1125F PR PAIN SEVERITY QUANTIFIED, PAIN PRESENT: ICD-10-PCS | Mod: CPTII,S$GLB,,

## 2022-07-20 PROCEDURE — 3008F BODY MASS INDEX DOCD: CPT | Mod: CPTII,S$GLB,,

## 2022-07-20 PROCEDURE — 3051F HG A1C>EQUAL 7.0%<8.0%: CPT | Mod: CPTII,S$GLB,,

## 2022-07-20 PROCEDURE — 1101F PT FALLS ASSESS-DOCD LE1/YR: CPT | Mod: CPTII,S$GLB,,

## 2022-07-20 PROCEDURE — 3077F PR MOST RECENT SYSTOLIC BLOOD PRESSURE >= 140 MM HG: ICD-10-PCS | Mod: CPTII,S$GLB,,

## 2022-07-20 PROCEDURE — 3072F LOW RISK FOR RETINOPATHY: CPT | Mod: CPTII,S$GLB,,

## 2022-07-20 PROCEDURE — 4010F PR ACE/ARB THEARPY RXD/TAKEN: ICD-10-PCS | Mod: CPTII,S$GLB,,

## 2022-07-20 PROCEDURE — 3061F NEG MICROALBUMINURIA REV: CPT | Mod: CPTII,S$GLB,,

## 2022-07-20 PROCEDURE — 3288F FALL RISK ASSESSMENT DOCD: CPT | Mod: CPTII,S$GLB,,

## 2022-07-20 PROCEDURE — 99499 NO LOS: ICD-10-PCS | Mod: S$GLB,,,

## 2022-07-20 PROCEDURE — 97597 DBRDMT OPN WND 1ST 20 CM/<: CPT | Mod: S$GLB,,,

## 2022-07-20 PROCEDURE — 3072F PR LOW RISK FOR RETINOPATHY: ICD-10-PCS | Mod: CPTII,S$GLB,,

## 2022-07-20 PROCEDURE — 3061F PR NEG MICROALBUMINURIA RESULT DOCUMENTED/REVIEW: ICD-10-PCS | Mod: CPTII,S$GLB,,

## 2022-07-20 PROCEDURE — 4010F ACE/ARB THERAPY RXD/TAKEN: CPT | Mod: CPTII,S$GLB,,

## 2022-07-20 PROCEDURE — 97597 DEBRIDEMENT: ICD-10-PCS | Mod: S$GLB,,,

## 2022-07-20 PROCEDURE — 3288F PR FALLS RISK ASSESSMENT DOCUMENTED: ICD-10-PCS | Mod: CPTII,S$GLB,,

## 2022-07-20 PROCEDURE — 3077F SYST BP >= 140 MM HG: CPT | Mod: CPTII,S$GLB,,

## 2022-07-20 PROCEDURE — 1125F AMNT PAIN NOTED PAIN PRSNT: CPT | Mod: CPTII,S$GLB,,

## 2022-07-20 PROCEDURE — 3078F DIAST BP <80 MM HG: CPT | Mod: CPTII,S$GLB,,

## 2022-07-20 PROCEDURE — 3008F PR BODY MASS INDEX (BMI) DOCUMENTED: ICD-10-PCS | Mod: CPTII,S$GLB,,

## 2022-07-20 PROCEDURE — 99999 PR PBB SHADOW E&M-EST. PATIENT-LVL IV: CPT | Mod: PBBFAC,,,

## 2022-07-20 NOTE — PROGRESS NOTES
Subjective:       Patient ID: Jono Gonzalez is a 67 y.o. male.  With PMHx chronic venous insufficiency, BLE venous insufficiency, BLE venous insufficiency, DM2, HTN, HLD, DAVID (on CPAP), morbid obesity    Chief Complaint: No chief complaint on file.    Patient presents for reevaluation of a posterior lower leg wound. No complaints at this time. Denies fever, chills, erythema, warmth, drainage, or pain.    Wound Check      Review of Systems   Constitutional: Negative for activity change, chills, diaphoresis, fatigue and fever.   Eyes: Negative for visual disturbance.   Respiratory: Negative for apnea, chest tightness and shortness of breath.    Cardiovascular: Positive for leg swelling. Negative for chest pain and palpitations.   Musculoskeletal: Negative for gait problem and joint swelling.   Skin: Positive for wound. Negative for pallor and rash.   Neurological: Negative for dizziness, syncope, weakness, light-headedness, numbness and headaches.   Psychiatric/Behavioral: Negative for agitation. The patient is not nervous/anxious.    All other systems reviewed and are negative.      Objective:      Physical Exam  Vitals reviewed.   Constitutional:       General: He is not in acute distress.     Appearance: Normal appearance.   Cardiovascular:      Rate and Rhythm: Normal rate and regular rhythm.      Pulses: Normal pulses.   Pulmonary:      Effort: No respiratory distress.   Musculoskeletal:         General: No swelling.      Right lower leg: Edema present.      Left lower leg: Edema present.   Skin:     General: Skin is warm and dry.      Capillary Refill: Capillary refill takes less than 2 seconds.      Findings: Wound present. No erythema.          Neurological:      General: No focal deficit present.      Mental Status: He is alert and oriented to person, place, and time.   Psychiatric:         Mood and Affect: Mood normal.         Behavior: Behavior normal.         Thought Content: Thought content normal.          Judgment: Judgment normal.         Assessment:       1. Venous stasis ulcer of left lower extremity    2. Chronic venous insufficiency    3. Lymphedema of both lower extremities    4. Type 2 diabetes mellitus without complication, without long-term current use of insulin    5. Primary hypertension    6. BMI 45.0-49.9, adult           Wound Left posterior Calf (Active)        Pre-existing:    Primary Wound Type:    Side: Left   Orientation: posterior   Location: Calf   Wound Number:    Ankle-Brachial Index:    Pulses:    Removal Indication and Assessment:    Wound Outcome:    (Retired) Wound Type:    (Retired) Wound Length (cm):    (Retired) Wound Width (cm):    (Retired) Depth (cm):    Wound Description (Comments):    Removal Indications:    Wound Image   07/20/22 1338   Dressing Appearance Dry;Intact;Clean 07/20/22 1338   Drainage Amount None 07/20/22 1338   Red (%), Wound Tissue Color 60 % 07/20/22 1338   Yellow (%), Wound Tissue Color 40 % 07/20/22 1338   Periwound Area Intact;[place] 07/20/22 1338   Wound Length (cm) 1.3 cm 07/20/22 1338   Wound Width (cm) 1 cm 07/20/22 1338   Wound Depth (cm) 0 cm 07/20/22 1338   Wound Volume (cm^3) 0 cm^3 07/20/22 1338   Wound Surface Area (cm^2) 1.3 cm^2 07/20/22 1338   Care Cleansed with:;Soap and water;Wound cleanser 07/20/22 1338   Dressing Applied;Hydrogel;Compression wrap 07/20/22 1338   Periwound Care Skin barrier film applied 07/20/22 1338   Compression Unna's Boot;Two layer compression 07/20/22 1338     Mr. Gonzalez was seen in wound care clinic today, placed is sitting position with legs elevated in treatment chair.  Unna boot dressing removed and left leg washed with Easi-cleanse sponge and dried thoroughly.  Evaluated and treated wound - Sharp debridement with 5 mm curette- Patient tolerated well.      Plan of care:  Calmoseptine barrier cream to robert-wound skin, hydrogel to wound bed with a two layer zinc oxide unna boot. Patient's foot positioned at a 90 degree  angle.  Zinc oxide unna boot followed by kerlix roll gauze and coban cohesive outer layer applied using a spiral technique avoiding creases of folds. Each layer was started behind the first metatarsal and ended below the tibial tubercle knee. There was overlap of each turn half the width of the previous turn.  Return to clinic in one week.     Plan:     Discussed offloading posterior wound with patient. Showed patient how to place a pillow behind his knee and below his foot to decrease pressure on wound. Discussed how constant pressure on wound decreases healing.    Left lower extremity dressed as detailed above  Patient instructed to elevated legs while sitting  Pt instructed not to get dressing wet and to use a cast cover for showering   If dressing should become wet, unroll each layer from the top going down, shower, irrigate for 10 minutes while in shower, pat dry and apply a wet-to-dry dressing over open wounds covered with roll gauze and secured with paper tape and two ace wrap bandages for compression  Notify this office immediately to have new dressing applied    Verbal and written instructions given to patient   Return to clinic in 1 week       Gracie Álvarez PA-C

## 2022-07-20 NOTE — PROCEDURES
"Debridement    Date/Time: 7/20/2022 1:30 PM  Performed by: Gracie Álvarez PA-C  Authorized by: Gracie Álvarez PA-C     Time out: Immediately prior to procedure a "time out" was called to verify the correct patient, procedure, equipment, support staff and site/side marked as required.    Consent Done?:  Yes (Written)  Local anesthesia used?: No      Wound Details:    Location:  Left leg    Type of Debridement:  Excisional       Length (cm):  1.3       Area (sq cm):  1.3       Width (cm):  1       Percent Debrided (%):  100       Depth (cm):  0       Total Area Debrided (sq cm):  1.3    Depth of debridement:  Epidermis/Dermis    Tissue debrided:  Dermis and Epidermis    Devitalized tissue debrided:  Biofilm, Exudate and Slough    Instruments:  Curette    Bleeding:  Minimal  Hemostasis Achieved: Yes    Method Used:  Pressure  Patient tolerance:  Patient tolerated the procedure well with no immediate complications      "

## 2022-07-22 ENCOUNTER — LAB VISIT (OUTPATIENT)
Dept: PRIMARY CARE CLINIC | Facility: CLINIC | Age: 67
End: 2022-07-22
Payer: COMMERCIAL

## 2022-07-22 DIAGNOSIS — E11.9 DIABETES MELLITUS WITHOUT COMPLICATION: ICD-10-CM

## 2022-07-22 LAB
ESTIMATED AVG GLUCOSE: 131 MG/DL (ref 68–131)
HBA1C MFR BLD: 6.2 % (ref 4–5.6)

## 2022-07-22 PROCEDURE — 36415 PR COLLECTION VENOUS BLOOD,VENIPUNCTURE: ICD-10-PCS | Mod: S$GLB,,, | Performed by: FAMILY MEDICINE

## 2022-07-22 PROCEDURE — 83036 HEMOGLOBIN GLYCOSYLATED A1C: CPT | Performed by: FAMILY MEDICINE

## 2022-07-22 PROCEDURE — 36415 COLL VENOUS BLD VENIPUNCTURE: CPT | Mod: S$GLB,,, | Performed by: FAMILY MEDICINE

## 2022-07-27 ENCOUNTER — OFFICE VISIT (OUTPATIENT)
Dept: WOUND CARE | Facility: CLINIC | Age: 67
End: 2022-07-27
Payer: COMMERCIAL

## 2022-07-27 ENCOUNTER — OFFICE VISIT (OUTPATIENT)
Dept: OPTOMETRY | Facility: CLINIC | Age: 67
End: 2022-07-27
Payer: COMMERCIAL

## 2022-07-27 VITALS
HEART RATE: 98 BPM | TEMPERATURE: 98 F | SYSTOLIC BLOOD PRESSURE: 117 MMHG | WEIGHT: 302.69 LBS | BODY MASS INDEX: 47.51 KG/M2 | HEIGHT: 67 IN | DIASTOLIC BLOOD PRESSURE: 62 MMHG

## 2022-07-27 DIAGNOSIS — E11.9 TYPE 2 DIABETES MELLITUS WITHOUT RETINOPATHY: Primary | ICD-10-CM

## 2022-07-27 DIAGNOSIS — I10 PRIMARY HYPERTENSION: ICD-10-CM

## 2022-07-27 DIAGNOSIS — E11.9 TYPE 2 DIABETES MELLITUS WITHOUT COMPLICATION, WITHOUT LONG-TERM CURRENT USE OF INSULIN: ICD-10-CM

## 2022-07-27 DIAGNOSIS — H25.13 SENILE NUCLEAR SCLEROSIS, BILATERAL: ICD-10-CM

## 2022-07-27 DIAGNOSIS — H52.4 BILATERAL PRESBYOPIA: ICD-10-CM

## 2022-07-27 DIAGNOSIS — I89.0 LYMPHEDEMA OF BOTH LOWER EXTREMITIES: ICD-10-CM

## 2022-07-27 DIAGNOSIS — I87.2 CHRONIC VENOUS INSUFFICIENCY: ICD-10-CM

## 2022-07-27 DIAGNOSIS — Z87.828 HEALED WOUND: Primary | ICD-10-CM

## 2022-07-27 PROCEDURE — 3061F NEG MICROALBUMINURIA REV: CPT | Mod: CPTII,S$GLB,,

## 2022-07-27 PROCEDURE — 3061F PR NEG MICROALBUMINURIA RESULT DOCUMENTED/REVIEW: ICD-10-PCS | Mod: CPTII,S$GLB,, | Performed by: OPTOMETRIST

## 2022-07-27 PROCEDURE — 1101F PT FALLS ASSESS-DOCD LE1/YR: CPT | Mod: CPTII,S$GLB,, | Performed by: OPTOMETRIST

## 2022-07-27 PROCEDURE — 3288F FALL RISK ASSESSMENT DOCD: CPT | Mod: CPTII,S$GLB,,

## 2022-07-27 PROCEDURE — 1160F RVW MEDS BY RX/DR IN RCRD: CPT | Mod: CPTII,S$GLB,,

## 2022-07-27 PROCEDURE — 99214 OFFICE O/P EST MOD 30 MIN: CPT | Mod: S$GLB,,,

## 2022-07-27 PROCEDURE — 3074F PR MOST RECENT SYSTOLIC BLOOD PRESSURE < 130 MM HG: ICD-10-PCS | Mod: CPTII,S$GLB,,

## 2022-07-27 PROCEDURE — 3066F NEPHROPATHY DOC TX: CPT | Mod: CPTII,S$GLB,,

## 2022-07-27 PROCEDURE — 99999 PR PBB SHADOW E&M-EST. PATIENT-LVL IV: CPT | Mod: PBBFAC,,,

## 2022-07-27 PROCEDURE — 4010F PR ACE/ARB THEARPY RXD/TAKEN: ICD-10-PCS | Mod: CPTII,S$GLB,,

## 2022-07-27 PROCEDURE — 1101F PR PT FALLS ASSESS DOC 0-1 FALLS W/OUT INJ PAST YR: ICD-10-PCS | Mod: CPTII,S$GLB,, | Performed by: OPTOMETRIST

## 2022-07-27 PROCEDURE — 4010F ACE/ARB THERAPY RXD/TAKEN: CPT | Mod: CPTII,S$GLB,,

## 2022-07-27 PROCEDURE — 3074F SYST BP LT 130 MM HG: CPT | Mod: CPTII,S$GLB,,

## 2022-07-27 PROCEDURE — 1159F MED LIST DOCD IN RCRD: CPT | Mod: CPTII,S$GLB,,

## 2022-07-27 PROCEDURE — 3044F PR MOST RECENT HEMOGLOBIN A1C LEVEL <7.0%: ICD-10-PCS | Mod: CPTII,S$GLB,, | Performed by: OPTOMETRIST

## 2022-07-27 PROCEDURE — 4010F ACE/ARB THERAPY RXD/TAKEN: CPT | Mod: CPTII,S$GLB,, | Performed by: OPTOMETRIST

## 2022-07-27 PROCEDURE — 3061F PR NEG MICROALBUMINURIA RESULT DOCUMENTED/REVIEW: ICD-10-PCS | Mod: CPTII,S$GLB,,

## 2022-07-27 PROCEDURE — 1159F PR MEDICATION LIST DOCUMENTED IN MEDICAL RECORD: ICD-10-PCS | Mod: CPTII,S$GLB,,

## 2022-07-27 PROCEDURE — 3078F PR MOST RECENT DIASTOLIC BLOOD PRESSURE < 80 MM HG: ICD-10-PCS | Mod: CPTII,S$GLB,,

## 2022-07-27 PROCEDURE — 1126F PR PAIN SEVERITY QUANTIFIED, NO PAIN PRESENT: ICD-10-PCS | Mod: CPTII,S$GLB,, | Performed by: OPTOMETRIST

## 2022-07-27 PROCEDURE — 2023F PR DILATED RETINAL EXAM W/O EVID OF RETINOPATHY: ICD-10-PCS | Mod: CPTII,S$GLB,, | Performed by: OPTOMETRIST

## 2022-07-27 PROCEDURE — 4010F PR ACE/ARB THEARPY RXD/TAKEN: ICD-10-PCS | Mod: CPTII,S$GLB,, | Performed by: OPTOMETRIST

## 2022-07-27 PROCEDURE — 3061F NEG MICROALBUMINURIA REV: CPT | Mod: CPTII,S$GLB,, | Performed by: OPTOMETRIST

## 2022-07-27 PROCEDURE — 1125F PR PAIN SEVERITY QUANTIFIED, PAIN PRESENT: ICD-10-PCS | Mod: CPTII,S$GLB,,

## 2022-07-27 PROCEDURE — 1101F PR PT FALLS ASSESS DOC 0-1 FALLS W/OUT INJ PAST YR: ICD-10-PCS | Mod: CPTII,S$GLB,,

## 2022-07-27 PROCEDURE — 3008F PR BODY MASS INDEX (BMI) DOCUMENTED: ICD-10-PCS | Mod: CPTII,S$GLB,,

## 2022-07-27 PROCEDURE — 3288F FALL RISK ASSESSMENT DOCD: CPT | Mod: CPTII,S$GLB,, | Performed by: OPTOMETRIST

## 2022-07-27 PROCEDURE — 1159F MED LIST DOCD IN RCRD: CPT | Mod: CPTII,S$GLB,, | Performed by: OPTOMETRIST

## 2022-07-27 PROCEDURE — 99214 PR OFFICE/OUTPT VISIT, EST, LEVL IV, 30-39 MIN: ICD-10-PCS | Mod: S$GLB,,,

## 2022-07-27 PROCEDURE — 3066F PR DOCUMENTATION OF TREATMENT FOR NEPHROPATHY: ICD-10-PCS | Mod: CPTII,S$GLB,,

## 2022-07-27 PROCEDURE — 3288F PR FALLS RISK ASSESSMENT DOCUMENTED: ICD-10-PCS | Mod: CPTII,S$GLB,, | Performed by: OPTOMETRIST

## 2022-07-27 PROCEDURE — 92014 COMPRE OPH EXAM EST PT 1/>: CPT | Mod: S$GLB,,, | Performed by: OPTOMETRIST

## 2022-07-27 PROCEDURE — 99999 PR PBB SHADOW E&M-EST. PATIENT-LVL IV: ICD-10-PCS | Mod: PBBFAC,,,

## 2022-07-27 PROCEDURE — 92014 PR EYE EXAM, EST PATIENT,COMPREHESV: ICD-10-PCS | Mod: S$GLB,,, | Performed by: OPTOMETRIST

## 2022-07-27 PROCEDURE — 3044F HG A1C LEVEL LT 7.0%: CPT | Mod: CPTII,S$GLB,, | Performed by: OPTOMETRIST

## 2022-07-27 PROCEDURE — 1160F RVW MEDS BY RX/DR IN RCRD: CPT | Mod: CPTII,S$GLB,, | Performed by: OPTOMETRIST

## 2022-07-27 PROCEDURE — 1160F PR REVIEW ALL MEDS BY PRESCRIBER/CLIN PHARMACIST DOCUMENTED: ICD-10-PCS | Mod: CPTII,S$GLB,, | Performed by: OPTOMETRIST

## 2022-07-27 PROCEDURE — 1125F AMNT PAIN NOTED PAIN PRSNT: CPT | Mod: CPTII,S$GLB,,

## 2022-07-27 PROCEDURE — 3008F BODY MASS INDEX DOCD: CPT | Mod: CPTII,S$GLB,,

## 2022-07-27 PROCEDURE — 1101F PT FALLS ASSESS-DOCD LE1/YR: CPT | Mod: CPTII,S$GLB,,

## 2022-07-27 PROCEDURE — 3072F LOW RISK FOR RETINOPATHY: CPT | Mod: CPTII,S$GLB,,

## 2022-07-27 PROCEDURE — 2023F DILAT RTA XM W/O RTNOPTHY: CPT | Mod: CPTII,S$GLB,, | Performed by: OPTOMETRIST

## 2022-07-27 PROCEDURE — 3072F PR LOW RISK FOR RETINOPATHY: ICD-10-PCS | Mod: CPTII,S$GLB,,

## 2022-07-27 PROCEDURE — 1159F PR MEDICATION LIST DOCUMENTED IN MEDICAL RECORD: ICD-10-PCS | Mod: CPTII,S$GLB,, | Performed by: OPTOMETRIST

## 2022-07-27 PROCEDURE — 1160F PR REVIEW ALL MEDS BY PRESCRIBER/CLIN PHARMACIST DOCUMENTED: ICD-10-PCS | Mod: CPTII,S$GLB,,

## 2022-07-27 PROCEDURE — 3078F DIAST BP <80 MM HG: CPT | Mod: CPTII,S$GLB,,

## 2022-07-27 PROCEDURE — 3066F NEPHROPATHY DOC TX: CPT | Mod: CPTII,S$GLB,, | Performed by: OPTOMETRIST

## 2022-07-27 PROCEDURE — 3066F PR DOCUMENTATION OF TREATMENT FOR NEPHROPATHY: ICD-10-PCS | Mod: CPTII,S$GLB,, | Performed by: OPTOMETRIST

## 2022-07-27 PROCEDURE — 3044F PR MOST RECENT HEMOGLOBIN A1C LEVEL <7.0%: ICD-10-PCS | Mod: CPTII,S$GLB,,

## 2022-07-27 PROCEDURE — 1126F AMNT PAIN NOTED NONE PRSNT: CPT | Mod: CPTII,S$GLB,, | Performed by: OPTOMETRIST

## 2022-07-27 PROCEDURE — 99999 PR PBB SHADOW E&M-EST. PATIENT-LVL III: CPT | Mod: PBBFAC,,, | Performed by: OPTOMETRIST

## 2022-07-27 PROCEDURE — 3288F PR FALLS RISK ASSESSMENT DOCUMENTED: ICD-10-PCS | Mod: CPTII,S$GLB,,

## 2022-07-27 PROCEDURE — 3044F HG A1C LEVEL LT 7.0%: CPT | Mod: CPTII,S$GLB,,

## 2022-07-27 PROCEDURE — 99999 PR PBB SHADOW E&M-EST. PATIENT-LVL III: ICD-10-PCS | Mod: PBBFAC,,, | Performed by: OPTOMETRIST

## 2022-07-27 NOTE — PROGRESS NOTES
Subjective:       Patient ID: Jono Gonzalez is a 67 y.o. male.  With PMHx chronic venous insufficiency, BLE venous insufficiency, BLE venous insufficiency, DM2, HTN, HLD, DAVID (on CPAP), morbid obesity    Chief Complaint: Wound Check    Patient presents for reevaluation of a posterior lower leg wound. No complaints at this time. Denies fever, chills, erythema, warmth, drainage, or pain.    Wound Check      Review of Systems   Constitutional: Negative for activity change, chills, diaphoresis, fatigue and fever.   Eyes: Negative for visual disturbance.   Respiratory: Negative for apnea, chest tightness and shortness of breath.    Cardiovascular: Positive for leg swelling. Negative for chest pain and palpitations.   Musculoskeletal: Negative for gait problem and joint swelling.   Skin: Positive for wound. Negative for pallor and rash.   Neurological: Negative for dizziness, syncope, weakness, light-headedness, numbness and headaches.   Psychiatric/Behavioral: Negative for agitation. The patient is not nervous/anxious.    All other systems reviewed and are negative.      Objective:      Physical Exam  Vitals reviewed.   Constitutional:       General: He is not in acute distress.     Appearance: Normal appearance.   Cardiovascular:      Rate and Rhythm: Normal rate and regular rhythm.      Pulses: Normal pulses.   Pulmonary:      Effort: No respiratory distress.   Musculoskeletal:         General: No swelling.      Right lower leg: Right lower leg edema: Compression stocking noted to RLE.      Left lower leg: Edema present.   Skin:     General: Skin is warm and dry.      Capillary Refill: Capillary refill takes less than 2 seconds.      Findings: No erythema or wound.          Neurological:      General: No focal deficit present.      Mental Status: He is alert and oriented to person, place, and time.   Psychiatric:         Mood and Affect: Mood normal.         Behavior: Behavior normal.         Thought Content: Thought  content normal.         Judgment: Judgment normal.         Assessment:       1. Healed wound    2. Chronic venous insufficiency    3. Lymphedema of both lower extremities    4. Type 2 diabetes mellitus without complication, without long-term current use of insulin    5. Primary hypertension    6. BMI 45.0-49.9, adult           Wound Left posterior Calf (Active)        Pre-existing:    Primary Wound Type:    Side: Left   Orientation: posterior   Location: Calf   Wound Number:    Ankle-Brachial Index:    Pulses:    Removal Indication and Assessment:    Wound Outcome:    (Retired) Wound Type:    (Retired) Wound Length (cm):    (Retired) Wound Width (cm):    (Retired) Depth (cm):    Wound Description (Comments):    Removal Indications:    Wound Image   07/27/22 1341   Dressing Appearance Dry;Intact;Clean 07/27/22 1341   Drainage Amount None 07/27/22 1341   Care Cleansed with:;Soap and water 07/27/22 1341   Dressing Applied;Compression wrap;Rolled gauze;Elastic bandage 07/27/22 1341   Compression Two layer compression 07/27/22 1341     Mr. Gonzalez was seen in wound care clinic today, placed is sitting position with legs elevated in treatment chair.  Unna boot dressing removed and left leg washed with Easi-cleanse sponge and dried thoroughly.  Wound noted to be healed.    Plan of care:  20-30 mmHg compression stockings to bilateral lower extremities    Plan:     Discussed offloading healed posterior wound with patient. Showed patient how to place a pillow behind his knee and below his foot to decrease pressure on newly formed skin.    Patient did not bring compression stocking for LLE. Wrapped leg with rolled gauze and coban for compression. Instructed pt to remove wrap tomorrow and apply compression stocking.     Cleared patient to return to work    RTC PRN       Gracie Álvarez PA-C

## 2022-07-27 NOTE — PROGRESS NOTES
GLORIA     TESS: 06/21  Chief complaint (CC): Patient is here for annual eye exam today.  Wears   OTC readers for near, seems to work fine.  Distance seems fine without   glasses.  Patient hasn't noticed any vision changes.  Glasses? +3.25 OTC  Contacts? -  H/o eye surgery, injections or laser: -  H/o eye injury: -  Known eye conditions? See above  Family h/o eye conditions? -  Eye gtts? -      (-) Flashes (-)  Floaters (-) Mucous   (-)  Tearing (-) Itching (-) Burning   (-) Headaches (-) Eye Pain/discomfort (-) Irritation   (-)  Redness (-) Double vision (-) Blurry vision    Diabetic? + doesn't monitor BS just got a new machine  A1c? Hemoglobin A1C       Date                     Value               Ref Range             Status                07/22/2022               6.2 (H)             4.0 - 5.6 %           Final                04/22/2022               7.1 (H)             4.0 - 5.6 %           Final                 02/08/2022               6.7 (H)             4.0 - 5.6 %           Final                    Last edited by Melody Sanders on 7/27/2022  2:53 PM. (History)            Assessment /Plan     For exam results, see Encounter Report.    Type 2 diabetes mellitus without retinopathy    Senile nuclear sclerosis, bilateral    Bilateral presbyopia      1. BS control. No signs of diabetic retinopathy. Monitor with annual exam.  2. Nuclear sclerotic cataract - not visually significant. Observe.  3. Cont w/OTC readers

## 2022-07-30 ENCOUNTER — PATIENT MESSAGE (OUTPATIENT)
Dept: WOUND CARE | Facility: CLINIC | Age: 67
End: 2022-07-30
Payer: COMMERCIAL

## 2022-09-19 ENCOUNTER — OFFICE VISIT (OUTPATIENT)
Dept: PRIMARY CARE CLINIC | Facility: CLINIC | Age: 67
End: 2022-09-19
Payer: COMMERCIAL

## 2022-09-19 VITALS
HEART RATE: 85 BPM | OXYGEN SATURATION: 97 % | SYSTOLIC BLOOD PRESSURE: 130 MMHG | TEMPERATURE: 98 F | WEIGHT: 305.44 LBS | BODY MASS INDEX: 47.94 KG/M2 | HEIGHT: 67 IN | DIASTOLIC BLOOD PRESSURE: 62 MMHG

## 2022-09-19 DIAGNOSIS — Z87.828 HEALED WOUND: ICD-10-CM

## 2022-09-19 DIAGNOSIS — E11.42 TYPE 2 DIABETES MELLITUS WITH DIABETIC POLYNEUROPATHY, UNSPECIFIED WHETHER LONG TERM INSULIN USE: Primary | ICD-10-CM

## 2022-09-19 PROCEDURE — 3072F PR LOW RISK FOR RETINOPATHY: ICD-10-PCS | Mod: CPTII,S$GLB,, | Performed by: NURSE PRACTITIONER

## 2022-09-19 PROCEDURE — 4010F ACE/ARB THERAPY RXD/TAKEN: CPT | Mod: CPTII,S$GLB,, | Performed by: NURSE PRACTITIONER

## 2022-09-19 PROCEDURE — 3075F PR MOST RECENT SYSTOLIC BLOOD PRESS GE 130-139MM HG: ICD-10-PCS | Mod: CPTII,S$GLB,, | Performed by: NURSE PRACTITIONER

## 2022-09-19 PROCEDURE — 3078F DIAST BP <80 MM HG: CPT | Mod: CPTII,S$GLB,, | Performed by: NURSE PRACTITIONER

## 2022-09-19 PROCEDURE — 99213 OFFICE O/P EST LOW 20 MIN: CPT | Mod: S$GLB,,, | Performed by: NURSE PRACTITIONER

## 2022-09-19 PROCEDURE — 1101F PT FALLS ASSESS-DOCD LE1/YR: CPT | Mod: CPTII,S$GLB,, | Performed by: NURSE PRACTITIONER

## 2022-09-19 PROCEDURE — 3288F PR FALLS RISK ASSESSMENT DOCUMENTED: ICD-10-PCS | Mod: CPTII,S$GLB,, | Performed by: NURSE PRACTITIONER

## 2022-09-19 PROCEDURE — 99999 PR PBB SHADOW E&M-EST. PATIENT-LVL IV: CPT | Mod: PBBFAC,,, | Performed by: NURSE PRACTITIONER

## 2022-09-19 PROCEDURE — 1125F AMNT PAIN NOTED PAIN PRSNT: CPT | Mod: CPTII,S$GLB,, | Performed by: NURSE PRACTITIONER

## 2022-09-19 PROCEDURE — 1125F PR PAIN SEVERITY QUANTIFIED, PAIN PRESENT: ICD-10-PCS | Mod: CPTII,S$GLB,, | Performed by: NURSE PRACTITIONER

## 2022-09-19 PROCEDURE — 3044F PR MOST RECENT HEMOGLOBIN A1C LEVEL <7.0%: ICD-10-PCS | Mod: CPTII,S$GLB,, | Performed by: NURSE PRACTITIONER

## 2022-09-19 PROCEDURE — 3078F PR MOST RECENT DIASTOLIC BLOOD PRESSURE < 80 MM HG: ICD-10-PCS | Mod: CPTII,S$GLB,, | Performed by: NURSE PRACTITIONER

## 2022-09-19 PROCEDURE — 3061F PR NEG MICROALBUMINURIA RESULT DOCUMENTED/REVIEW: ICD-10-PCS | Mod: CPTII,S$GLB,, | Performed by: NURSE PRACTITIONER

## 2022-09-19 PROCEDURE — 3008F PR BODY MASS INDEX (BMI) DOCUMENTED: ICD-10-PCS | Mod: CPTII,S$GLB,, | Performed by: NURSE PRACTITIONER

## 2022-09-19 PROCEDURE — 3072F LOW RISK FOR RETINOPATHY: CPT | Mod: CPTII,S$GLB,, | Performed by: NURSE PRACTITIONER

## 2022-09-19 PROCEDURE — 3061F NEG MICROALBUMINURIA REV: CPT | Mod: CPTII,S$GLB,, | Performed by: NURSE PRACTITIONER

## 2022-09-19 PROCEDURE — 1159F PR MEDICATION LIST DOCUMENTED IN MEDICAL RECORD: ICD-10-PCS | Mod: CPTII,S$GLB,, | Performed by: NURSE PRACTITIONER

## 2022-09-19 PROCEDURE — 3044F HG A1C LEVEL LT 7.0%: CPT | Mod: CPTII,S$GLB,, | Performed by: NURSE PRACTITIONER

## 2022-09-19 PROCEDURE — 99213 PR OFFICE/OUTPT VISIT, EST, LEVL III, 20-29 MIN: ICD-10-PCS | Mod: S$GLB,,, | Performed by: NURSE PRACTITIONER

## 2022-09-19 PROCEDURE — 3066F NEPHROPATHY DOC TX: CPT | Mod: CPTII,S$GLB,, | Performed by: NURSE PRACTITIONER

## 2022-09-19 PROCEDURE — 4010F PR ACE/ARB THEARPY RXD/TAKEN: ICD-10-PCS | Mod: CPTII,S$GLB,, | Performed by: NURSE PRACTITIONER

## 2022-09-19 PROCEDURE — 1159F MED LIST DOCD IN RCRD: CPT | Mod: CPTII,S$GLB,, | Performed by: NURSE PRACTITIONER

## 2022-09-19 PROCEDURE — 3288F FALL RISK ASSESSMENT DOCD: CPT | Mod: CPTII,S$GLB,, | Performed by: NURSE PRACTITIONER

## 2022-09-19 PROCEDURE — 99999 PR PBB SHADOW E&M-EST. PATIENT-LVL IV: ICD-10-PCS | Mod: PBBFAC,,, | Performed by: NURSE PRACTITIONER

## 2022-09-19 PROCEDURE — 3066F PR DOCUMENTATION OF TREATMENT FOR NEPHROPATHY: ICD-10-PCS | Mod: CPTII,S$GLB,, | Performed by: NURSE PRACTITIONER

## 2022-09-19 PROCEDURE — 3008F BODY MASS INDEX DOCD: CPT | Mod: CPTII,S$GLB,, | Performed by: NURSE PRACTITIONER

## 2022-09-19 PROCEDURE — 1101F PR PT FALLS ASSESS DOC 0-1 FALLS W/OUT INJ PAST YR: ICD-10-PCS | Mod: CPTII,S$GLB,, | Performed by: NURSE PRACTITIONER

## 2022-09-19 PROCEDURE — 3075F SYST BP GE 130 - 139MM HG: CPT | Mod: CPTII,S$GLB,, | Performed by: NURSE PRACTITIONER

## 2022-09-19 NOTE — PROGRESS NOTES
Subjective:       Patient ID: Jono Gonzalez is a 67 y.o. male.    Chief Complaint: Follow-up    Mr. Jono Gonzalez is a 67 year old male, new to me, presents to the clinic for follow up. PCP is Jim Linn. Medical and surgical history in addition to problem list reviewed as listed below.    Presents for follow up. Reports that he is taking Metformin 500 mg twice a day and Ozempic 0.5 mg once weekly. Seen by wound care clinic for posterior left lower leg wound that has healed.      Past Medical History:   Diagnosis Date    Arthritis     Cataract     Diabetes mellitus     Gout attack     Hypertension         Past Surgical History:   Procedure Laterality Date    BACK SURGERY      lumbar fusion      X 2        Family History   Problem Relation Age of Onset    Diabetes Mother     Heart disease Mother     Thyroid disease Mother     Diabetes Sister     Heart disease Father     Diabetes Father     Heart disease Brother     Heart disease Sister     Diabetes Sister     Stroke Sister        Social History     Tobacco Use   Smoking Status Never   Smokeless Tobacco Never       Social History     Social History Narrative    Not on file       Review of patient's allergies indicates:  No Known Allergies     Review of Systems   Constitutional:  Negative for chills and fever.   Gastrointestinal:  Negative for nausea and vomiting.   Skin:  Positive for wound (healing wound to left lower posterior leg).       Objective:        Vitals:    09/19/22 1625   BP: 130/62   Pulse:    Temp:         Physical Exam  Constitutional:       Appearance: Normal appearance.   Cardiovascular:      Rate and Rhythm: Normal rate.      Pulses: Normal pulses.   Pulmonary:      Effort: Pulmonary effort is normal.   Musculoskeletal:      Left lower leg: Swelling present. 2+ Edema present.   Neurological:      Mental Status: He is alert and oriented to person, place, and time.       Assessment:       1. Type 2 diabetes mellitus with diabetic polyneuropathy,  unspecified whether long term insulin use    2. Healed wound        Plan:       Type 2 diabetes mellitus with diabetic polyneuropathy, unspecified whether long term insulin use  -     Hemoglobin A1C; Future; Expected date: 09/19/2022  Continuing current management.   Extensive teaching and reiteration of diabetic management and care.    Healed wound  Comments:  posterior lower leg     Continuing current management.     Medication List with Changes/Refills   Current Medications    ALLOPURINOL (ZYLOPRIM) 300 MG TABLET    TAKE 1 TABLET(300 MG) BY MOUTH EVERY DAY    AMLODIPINE (NORVASC) 10 MG TABLET    Take 10 mg by mouth once daily.    ANASTROZOLE (ARIMIDEX) 1 MG TAB    Take 1 mg by mouth once daily.    ASPIRIN (ECOTRIN) 81 MG EC TABLET    Take 81 mg by mouth once daily.    ATORVASTATIN (LIPITOR) 40 MG TABLET    Take 1 tablet (40 mg total) by mouth every evening. For cholesterol    CLOTRIMAZOLE-BETAMETHASONE 1-0.05% (LOTRISONE) CREAM    Apply topically once daily. To feet and toenails    FINASTERIDE (PROSCAR) 5 MG TABLET    TK 1 T PO QD    FUROSEMIDE (LASIX) 40 MG TABLET    Take 1 tablet (40 mg total) by mouth 2 (two) times a day.    HYDROCODONE-ACETAMINOPHEN (NORCO)  MG PER TABLET    Take 1 tablet by mouth.    HYDROCODONE-ACETAMINOPHEN 5-325MG (NORCO) 5-325 MG PER TABLET    TK 1 T PO  Q 6 TO 8 H    METFORMIN (GLUCOPHAGE-XR) 500 MG ER 24HR TABLET    Take 1 tablet (500 mg total) by mouth 2 (two) times daily with meals.    PREGABALIN (LYRICA) 100 MG CAPSULE    TAKE 1 CAPSULE(100 MG) BY MOUTH THREE TIMES DAILY    SEMAGLUTIDE (OZEMPIC) 0.25 MG OR 0.5 MG(2 MG/1.5 ML) PEN INJECTOR    Inject 0.5 mg into the skin every 7 days.    TAMSULOSIN (FLOMAX) 0.4 MG CP24    TK 1 C PO BID   Changed and/or Refilled Medications    Modified Medication Previous Medication    OLMESARTAN (BENICAR) 40 MG TABLET olmesartan (BENICAR) 40 MG tablet       Take 1 tablet (40 mg total) by mouth once daily. Blood pressure    Take 1 tablet (40 mg  total) by mouth once daily. Blood pressure            Follow up if symptoms worsen or fail to improve.    JOON Wynn, MSN, FNP-C

## 2022-09-20 ENCOUNTER — LAB VISIT (OUTPATIENT)
Dept: PRIMARY CARE CLINIC | Facility: CLINIC | Age: 67
End: 2022-09-20
Payer: COMMERCIAL

## 2022-09-20 DIAGNOSIS — E11.42 TYPE 2 DIABETES MELLITUS WITH DIABETIC POLYNEUROPATHY, UNSPECIFIED WHETHER LONG TERM INSULIN USE: ICD-10-CM

## 2022-09-20 LAB
ESTIMATED AVG GLUCOSE: 120 MG/DL (ref 68–131)
HBA1C MFR BLD: 5.8 % (ref 4–5.6)

## 2022-09-20 PROCEDURE — 83036 HEMOGLOBIN GLYCOSYLATED A1C: CPT | Performed by: NURSE PRACTITIONER

## 2022-09-21 RX ORDER — OLMESARTAN MEDOXOMIL 40 MG/1
40 TABLET ORAL DAILY
Qty: 90 TABLET | Refills: 3 | Status: SHIPPED | OUTPATIENT
Start: 2022-09-21 | End: 2023-08-24 | Stop reason: SDUPTHER

## 2022-09-21 NOTE — TELEPHONE ENCOUNTER
----- Message from Leslee Ferreira sent at 9/20/2022  4:53 PM CDT -----  Regarding: Med Refill  Contact: Mr. Gonzalez [phone]  Good Evening    Above patient is requesting a Rx refill on olmesartan (BENICAR) 40 MG tablet. Can some one please assist patient?

## 2022-10-04 ENCOUNTER — OFFICE VISIT (OUTPATIENT)
Dept: CARDIOLOGY | Facility: CLINIC | Age: 67
End: 2022-10-04
Payer: COMMERCIAL

## 2022-10-04 VITALS
BODY MASS INDEX: 47.37 KG/M2 | HEART RATE: 99 BPM | DIASTOLIC BLOOD PRESSURE: 77 MMHG | SYSTOLIC BLOOD PRESSURE: 151 MMHG | WEIGHT: 301.81 LBS | RESPIRATION RATE: 20 BRPM | HEIGHT: 67 IN

## 2022-10-04 DIAGNOSIS — I87.2 CHRONIC VENOUS INSUFFICIENCY: Primary | ICD-10-CM

## 2022-10-04 DIAGNOSIS — E66.01 CLASS 2 SEVERE OBESITY DUE TO EXCESS CALORIES WITH SERIOUS COMORBIDITY IN ADULT, UNSPECIFIED BMI: ICD-10-CM

## 2022-10-04 DIAGNOSIS — E78.2 MIXED HYPERLIPIDEMIA: ICD-10-CM

## 2022-10-04 DIAGNOSIS — I10 PRIMARY HYPERTENSION: ICD-10-CM

## 2022-10-04 PROCEDURE — 4010F ACE/ARB THERAPY RXD/TAKEN: CPT | Mod: CPTII,S$GLB,, | Performed by: INTERNAL MEDICINE

## 2022-10-04 PROCEDURE — 3066F NEPHROPATHY DOC TX: CPT | Mod: CPTII,S$GLB,, | Performed by: INTERNAL MEDICINE

## 2022-10-04 PROCEDURE — 3288F PR FALLS RISK ASSESSMENT DOCUMENTED: ICD-10-PCS | Mod: CPTII,S$GLB,, | Performed by: INTERNAL MEDICINE

## 2022-10-04 PROCEDURE — 3008F BODY MASS INDEX DOCD: CPT | Mod: CPTII,S$GLB,, | Performed by: INTERNAL MEDICINE

## 2022-10-04 PROCEDURE — 99999 PR PBB SHADOW E&M-EST. PATIENT-LVL IV: CPT | Mod: PBBFAC,,, | Performed by: INTERNAL MEDICINE

## 2022-10-04 PROCEDURE — 3044F HG A1C LEVEL LT 7.0%: CPT | Mod: CPTII,S$GLB,, | Performed by: INTERNAL MEDICINE

## 2022-10-04 PROCEDURE — 3077F PR MOST RECENT SYSTOLIC BLOOD PRESSURE >= 140 MM HG: ICD-10-PCS | Mod: CPTII,S$GLB,, | Performed by: INTERNAL MEDICINE

## 2022-10-04 PROCEDURE — 1159F PR MEDICATION LIST DOCUMENTED IN MEDICAL RECORD: ICD-10-PCS | Mod: CPTII,S$GLB,, | Performed by: INTERNAL MEDICINE

## 2022-10-04 PROCEDURE — 99999 PR PBB SHADOW E&M-EST. PATIENT-LVL IV: ICD-10-PCS | Mod: PBBFAC,,, | Performed by: INTERNAL MEDICINE

## 2022-10-04 PROCEDURE — 3061F NEG MICROALBUMINURIA REV: CPT | Mod: CPTII,S$GLB,, | Performed by: INTERNAL MEDICINE

## 2022-10-04 PROCEDURE — 1101F PR PT FALLS ASSESS DOC 0-1 FALLS W/OUT INJ PAST YR: ICD-10-PCS | Mod: CPTII,S$GLB,, | Performed by: INTERNAL MEDICINE

## 2022-10-04 PROCEDURE — 3078F PR MOST RECENT DIASTOLIC BLOOD PRESSURE < 80 MM HG: ICD-10-PCS | Mod: CPTII,S$GLB,, | Performed by: INTERNAL MEDICINE

## 2022-10-04 PROCEDURE — 3072F LOW RISK FOR RETINOPATHY: CPT | Mod: CPTII,S$GLB,, | Performed by: INTERNAL MEDICINE

## 2022-10-04 PROCEDURE — 3072F PR LOW RISK FOR RETINOPATHY: ICD-10-PCS | Mod: CPTII,S$GLB,, | Performed by: INTERNAL MEDICINE

## 2022-10-04 PROCEDURE — 1160F RVW MEDS BY RX/DR IN RCRD: CPT | Mod: CPTII,S$GLB,, | Performed by: INTERNAL MEDICINE

## 2022-10-04 PROCEDURE — 3066F PR DOCUMENTATION OF TREATMENT FOR NEPHROPATHY: ICD-10-PCS | Mod: CPTII,S$GLB,, | Performed by: INTERNAL MEDICINE

## 2022-10-04 PROCEDURE — 1160F PR REVIEW ALL MEDS BY PRESCRIBER/CLIN PHARMACIST DOCUMENTED: ICD-10-PCS | Mod: CPTII,S$GLB,, | Performed by: INTERNAL MEDICINE

## 2022-10-04 PROCEDURE — 3061F PR NEG MICROALBUMINURIA RESULT DOCUMENTED/REVIEW: ICD-10-PCS | Mod: CPTII,S$GLB,, | Performed by: INTERNAL MEDICINE

## 2022-10-04 PROCEDURE — 1159F MED LIST DOCD IN RCRD: CPT | Mod: CPTII,S$GLB,, | Performed by: INTERNAL MEDICINE

## 2022-10-04 PROCEDURE — 3008F PR BODY MASS INDEX (BMI) DOCUMENTED: ICD-10-PCS | Mod: CPTII,S$GLB,, | Performed by: INTERNAL MEDICINE

## 2022-10-04 PROCEDURE — 1101F PT FALLS ASSESS-DOCD LE1/YR: CPT | Mod: CPTII,S$GLB,, | Performed by: INTERNAL MEDICINE

## 2022-10-04 PROCEDURE — 99215 PR OFFICE/OUTPT VISIT, EST, LEVL V, 40-54 MIN: ICD-10-PCS | Mod: S$GLB,,, | Performed by: INTERNAL MEDICINE

## 2022-10-04 PROCEDURE — 99215 OFFICE O/P EST HI 40 MIN: CPT | Mod: S$GLB,,, | Performed by: INTERNAL MEDICINE

## 2022-10-04 PROCEDURE — 3288F FALL RISK ASSESSMENT DOCD: CPT | Mod: CPTII,S$GLB,, | Performed by: INTERNAL MEDICINE

## 2022-10-04 PROCEDURE — 4010F PR ACE/ARB THEARPY RXD/TAKEN: ICD-10-PCS | Mod: CPTII,S$GLB,, | Performed by: INTERNAL MEDICINE

## 2022-10-04 PROCEDURE — 3044F PR MOST RECENT HEMOGLOBIN A1C LEVEL <7.0%: ICD-10-PCS | Mod: CPTII,S$GLB,, | Performed by: INTERNAL MEDICINE

## 2022-10-04 PROCEDURE — 3078F DIAST BP <80 MM HG: CPT | Mod: CPTII,S$GLB,, | Performed by: INTERNAL MEDICINE

## 2022-10-04 PROCEDURE — 3077F SYST BP >= 140 MM HG: CPT | Mod: CPTII,S$GLB,, | Performed by: INTERNAL MEDICINE

## 2022-10-04 PROCEDURE — 1126F AMNT PAIN NOTED NONE PRSNT: CPT | Mod: CPTII,S$GLB,, | Performed by: INTERNAL MEDICINE

## 2022-10-04 PROCEDURE — 1126F PR PAIN SEVERITY QUANTIFIED, NO PAIN PRESENT: ICD-10-PCS | Mod: CPTII,S$GLB,, | Performed by: INTERNAL MEDICINE

## 2022-10-04 NOTE — PROGRESS NOTES
HPI:  This is a 67-year-old male with a history of venous insufficiency, bilateral lower extremity lymphedema, diabetes, hypertension, hyperlipidemia, obesity, and DAVID presenting for initial evaluation by me.  The patient usually follows in vascular clinic with Dr. Ashby and is followed by Dr. Linn in Cardiology Clinic.    He comes in today for vascular med follow-up. Today he reports feeling well. Had a LLE venous ulcer that was well managed by wound care and has healed. This was his second LLE ulcer. Now he has stable edema of B LEs. He is managed with furosemide 40x1 and has been using compression stockings daily for 8+ hours per day for 7 years. He has been dealing with venous insufficiency for many years. His swelling doesn't cause pain, but he does have difficulty putting shoes on. Compression stockings and diuretic therapy as well as lifestyle modifications help significantly.     PHYSICAL EXAM:  Vitals:    10/04/22 1603   BP: (!) 151/77   Pulse: 99   Resp: 20       Physical Exam  Constitutional:       Appearance: Normal appearance.   Neck:      Vascular: No carotid bruit or JVD.   Cardiovascular:      Rate and Rhythm: Normal rate and regular rhythm.      Pulses:           Radial pulses are 2+ on the right side and 2+ on the left side.        Dorsalis pedis pulses are 1+ on the right side and 1+ on the left side.      Heart sounds: S1 normal and S2 normal. No murmur heard.    No S3 sounds.      Comments: B LE edema. Toes spared. Healed anterior shin and distal posterior calf ulcer. Chronic bilateral le lipodermatosclerosis.  Pulmonary:      Effort: Pulmonary effort is normal.      Breath sounds: Normal breath sounds. No rales.   Neurological:      Mental Status: He is alert.       LABS/CARDIAC TESTS (imaging reviewed during clinic visit):  June 2022 hemoglobin 13.9.  Creatinine 0.9 with BUN of 15.  LDL 59 and HDL 54.  A1c 7.1.  ECG 2021 demonstrates sinus rhythm with no Q-waves or ST changes.  Right  bundle-branch block.    ARIANA 2021 0.73/0.76.  Normal TBIs bilaterally.  TTE 2021 Nml LV size and fxn. CVP 3.   PET stress 2021 No e/o ischemia. Nml LVEF.  Arterial duplex June 2022 normal ARIANA bilaterally with no evidence of significant stenosis.  Venous duplex June 2022 shows no evidence of DVT bilaterally.  2021 shows no evidence of DVT with bilateral GSV and left SSV reflux.    ASSESSMENT & PLAN:    Chronic venous insufficiency  C5, Ep, As, Pr LLE. C4b RLE. Bilateral GSV reflux with L SSV reflux as well. This is despite daily compression stocking use. He has had successful resolution of LLE cellulitis and wound healing.     Recommend LLE GSV/SSV ablation. The patient has no allergies to cyanoacrylate. Will setup for NTNT of his L GSV 1st in November unless insurance prefers RFA.      Hypertension  Average Bps acceptable on olmesartan 40x1 and furosemide 40x1. Pt did a trial off of amlodipine and he thinks it may have helped with swelling.     HLD (hyperlipidemia)  LDL at goal on atorvastatin 40x1.     Obesity  Must lose weight. Discussed importance of this.       Chronic venous insufficiency  -     CV Chemical Adhesive Treatment - Single Vein; Future    Primary hypertension    Mixed hyperlipidemia    Class 2 severe obesity due to excess calories with serious comorbidity in adult, unspecified BMI      Shaheen Garcia MD    Addendum:    VCSS of 15 with healed venous ulcers of his LLE and cellulitis.

## 2022-10-04 NOTE — ASSESSMENT & PLAN NOTE
C5, Ep, As, Pr LLE. C4b RLE. Bilateral GSV reflux with L SSV reflux as well. This is despite daily compression stocking use. He has had successful resolution of LLE cellulitis and wound healing.     Recommend LLE GSV/SSV ablation. The patient has no allergies to cyanoacrylate. Will setup for NTNT of his L GSV 1st in November unless insurance prefers RFA.

## 2022-10-04 NOTE — ASSESSMENT & PLAN NOTE
Average Bps acceptable on olmesartan 40x1 and furosemide 40x1. Pt did a trial off of amlodipine and he thinks it may have helped with swelling.

## 2022-10-05 DIAGNOSIS — Z12.11 COLON CANCER SCREENING: ICD-10-CM

## 2022-10-06 ENCOUNTER — PATIENT MESSAGE (OUTPATIENT)
Dept: BARIATRICS | Facility: CLINIC | Age: 67
End: 2022-10-06
Payer: COMMERCIAL

## 2022-11-02 ENCOUNTER — TELEPHONE (OUTPATIENT)
Dept: CARDIOLOGY | Facility: CLINIC | Age: 67
End: 2022-11-02

## 2022-11-02 ENCOUNTER — TELEPHONE (OUTPATIENT)
Dept: CARDIOLOGY | Facility: CLINIC | Age: 67
End: 2022-11-02
Payer: COMMERCIAL

## 2022-11-02 NOTE — TELEPHONE ENCOUNTER
Preparing for VenaSeal Procedure    VenaSeal of a vein is an outpatient procedure that involves using a medical glue. As a result of this procedure, the vein will close and eventually disappear, allowing blood to divert to healthy veins in the leg.    Your VenaSeal procedure is scheduled on Monday, November 14, 2022 at 10:00  AM. Please arrive approximately 30 minutes prior to your appointment time to complete any last-minute paperwork.    If your procedure is in the morning, eat a light breakfast. If your procedure is the afternoon, eat a light lunch. Please hydrate yourself with water prior to your procedure.    Take a shower and wash your legs with antibacterial soap on the morning of the procedure. Please do not apply lotion to your legs before procedure.    If you feel anxious and cannot relax prior to the procedure, you may take a sedative tablet such as Valium at the time of the procedure. You will need a friend or family member to drive you home after the procedure.    An appointment for VenaSeal vein treatment takes approximately 1-2 hours, though it may require more or less time.    Male patients - if you wear boxer shorts, you will need to wear briefs the day of your procedure.    We will process authorization with insurance. However, you should call your insurance to make sure your policy covers the procedure (CPT 03311).    If you have any questions or concerns prior to the appointment, please feel free to call the office at [phone].

## 2022-11-03 ENCOUNTER — PATIENT MESSAGE (OUTPATIENT)
Dept: ADMINISTRATIVE | Facility: HOSPITAL | Age: 67
End: 2022-11-03
Payer: COMMERCIAL

## 2022-11-11 ENCOUNTER — TELEPHONE (OUTPATIENT)
Dept: CARDIOLOGY | Facility: CLINIC | Age: 67
End: 2022-11-11
Payer: COMMERCIAL

## 2022-11-11 NOTE — TELEPHONE ENCOUNTER
----- Message from Keena Daniels MA sent at 11/11/2022 12:33 PM CST -----  Contact: self  Pt saw  yesterday  Thurs 11/10. He's having a procedure on Mon.1114. He's having problems with insurance- [employer]. Please call.

## 2022-12-15 ENCOUNTER — PATIENT MESSAGE (OUTPATIENT)
Dept: PRIMARY CARE CLINIC | Facility: CLINIC | Age: 67
End: 2022-12-15
Payer: COMMERCIAL

## 2022-12-15 DIAGNOSIS — U07.1 COVID-19: Primary | ICD-10-CM

## 2022-12-20 RX ORDER — BENZONATATE 200 MG/1
200 CAPSULE ORAL 3 TIMES DAILY PRN
Qty: 30 CAPSULE | Refills: 0 | Status: SHIPPED | OUTPATIENT
Start: 2022-12-20 | End: 2022-12-30

## 2022-12-20 RX ORDER — PROMETHAZINE HYDROCHLORIDE AND DEXTROMETHORPHAN HYDROBROMIDE 6.25; 15 MG/5ML; MG/5ML
5 SYRUP ORAL EVERY 4 HOURS PRN
Qty: 118 ML | Refills: 0 | Status: SHIPPED | OUTPATIENT
Start: 2022-12-20 | End: 2023-01-12 | Stop reason: SDUPTHER

## 2022-12-30 ENCOUNTER — OFFICE VISIT (OUTPATIENT)
Dept: UROLOGY | Facility: CLINIC | Age: 67
End: 2022-12-30
Payer: COMMERCIAL

## 2022-12-30 ENCOUNTER — PATIENT MESSAGE (OUTPATIENT)
Dept: ADMINISTRATIVE | Facility: HOSPITAL | Age: 67
End: 2022-12-30
Payer: COMMERCIAL

## 2022-12-30 ENCOUNTER — LAB VISIT (OUTPATIENT)
Dept: LAB | Facility: HOSPITAL | Age: 67
End: 2022-12-30
Payer: COMMERCIAL

## 2022-12-30 ENCOUNTER — PATIENT OUTREACH (OUTPATIENT)
Dept: ADMINISTRATIVE | Facility: HOSPITAL | Age: 67
End: 2022-12-30
Payer: COMMERCIAL

## 2022-12-30 VITALS
BODY MASS INDEX: 47.76 KG/M2 | WEIGHT: 304.31 LBS | SYSTOLIC BLOOD PRESSURE: 138 MMHG | HEIGHT: 67 IN | DIASTOLIC BLOOD PRESSURE: 82 MMHG | HEART RATE: 95 BPM

## 2022-12-30 DIAGNOSIS — Z12.5 PROSTATE CANCER SCREENING: ICD-10-CM

## 2022-12-30 DIAGNOSIS — R35.0 FREQUENCY OF URINATION: ICD-10-CM

## 2022-12-30 DIAGNOSIS — N52.9 ERECTILE DYSFUNCTION, UNSPECIFIED ERECTILE DYSFUNCTION TYPE: ICD-10-CM

## 2022-12-30 DIAGNOSIS — N40.0 BENIGN PROSTATIC HYPERPLASIA, UNSPECIFIED WHETHER LOWER URINARY TRACT SYMPTOMS PRESENT: ICD-10-CM

## 2022-12-30 DIAGNOSIS — E29.1 MALE HYPOGONADISM: Primary | ICD-10-CM

## 2022-12-30 DIAGNOSIS — C61 PROSTATE CANCER: ICD-10-CM

## 2022-12-30 LAB
BILIRUB SERPL-MCNC: NORMAL MG/DL
BLOOD URINE, POC: NORMAL
CLARITY, POC UA: CLEAR
COLOR, POC UA: YELLOW
COMPLEXED PSA SERPL-MCNC: 0.44 NG/ML (ref 0–4)
GLUCOSE UR QL STRIP: NORMAL
KETONES UR QL STRIP: NORMAL
LEUKOCYTE ESTERASE URINE, POC: NORMAL
NITRITE, POC UA: NORMAL
PH, POC UA: 5
POC RESIDUAL URINE VOLUME: 41 ML (ref 0–100)
PROTEIN, POC: NORMAL
SPECIFIC GRAVITY, POC UA: 1.01
UROBILINOGEN, POC UA: NORMAL

## 2022-12-30 PROCEDURE — 3061F NEG MICROALBUMINURIA REV: CPT | Mod: CPTII,S$GLB,,

## 2022-12-30 PROCEDURE — 3061F PR NEG MICROALBUMINURIA RESULT DOCUMENTED/REVIEW: ICD-10-PCS | Mod: CPTII,S$GLB,,

## 2022-12-30 PROCEDURE — 4010F PR ACE/ARB THEARPY RXD/TAKEN: ICD-10-PCS | Mod: CPTII,S$GLB,,

## 2022-12-30 PROCEDURE — 84153 ASSAY OF PSA TOTAL: CPT

## 2022-12-30 PROCEDURE — 1125F PR PAIN SEVERITY QUANTIFIED, PAIN PRESENT: ICD-10-PCS | Mod: CPTII,S$GLB,,

## 2022-12-30 PROCEDURE — 3079F PR MOST RECENT DIASTOLIC BLOOD PRESSURE 80-89 MM HG: ICD-10-PCS | Mod: CPTII,S$GLB,,

## 2022-12-30 PROCEDURE — 36415 COLL VENOUS BLD VENIPUNCTURE: CPT

## 2022-12-30 PROCEDURE — 3072F LOW RISK FOR RETINOPATHY: CPT | Mod: CPTII,S$GLB,,

## 2022-12-30 PROCEDURE — 3066F NEPHROPATHY DOC TX: CPT | Mod: CPTII,S$GLB,,

## 2022-12-30 PROCEDURE — 99204 PR OFFICE/OUTPT VISIT, NEW, LEVL IV, 45-59 MIN: ICD-10-PCS | Mod: S$GLB,,,

## 2022-12-30 PROCEDURE — 99999 PR PBB SHADOW E&M-EST. PATIENT-LVL V: CPT | Mod: PBBFAC,,,

## 2022-12-30 PROCEDURE — 81002 POCT URINE DIPSTICK WITHOUT MICROSCOPE: ICD-10-PCS | Mod: S$GLB,,,

## 2022-12-30 PROCEDURE — 99204 OFFICE O/P NEW MOD 45 MIN: CPT | Mod: S$GLB,,,

## 2022-12-30 PROCEDURE — 1160F PR REVIEW ALL MEDS BY PRESCRIBER/CLIN PHARMACIST DOCUMENTED: ICD-10-PCS | Mod: CPTII,S$GLB,,

## 2022-12-30 PROCEDURE — 81002 URINALYSIS NONAUTO W/O SCOPE: CPT | Mod: S$GLB,,,

## 2022-12-30 PROCEDURE — 1160F RVW MEDS BY RX/DR IN RCRD: CPT | Mod: CPTII,S$GLB,,

## 2022-12-30 PROCEDURE — 3072F PR LOW RISK FOR RETINOPATHY: ICD-10-PCS | Mod: CPTII,S$GLB,,

## 2022-12-30 PROCEDURE — 4010F ACE/ARB THERAPY RXD/TAKEN: CPT | Mod: CPTII,S$GLB,,

## 2022-12-30 PROCEDURE — 3066F PR DOCUMENTATION OF TREATMENT FOR NEPHROPATHY: ICD-10-PCS | Mod: CPTII,S$GLB,,

## 2022-12-30 PROCEDURE — 99999 PR PBB SHADOW E&M-EST. PATIENT-LVL V: ICD-10-PCS | Mod: PBBFAC,,,

## 2022-12-30 PROCEDURE — 1101F PR PT FALLS ASSESS DOC 0-1 FALLS W/OUT INJ PAST YR: ICD-10-PCS | Mod: CPTII,S$GLB,,

## 2022-12-30 PROCEDURE — 3044F PR MOST RECENT HEMOGLOBIN A1C LEVEL <7.0%: ICD-10-PCS | Mod: CPTII,S$GLB,,

## 2022-12-30 PROCEDURE — 51798 POCT BLADDER SCAN: ICD-10-PCS | Mod: S$GLB,,,

## 2022-12-30 PROCEDURE — 3008F BODY MASS INDEX DOCD: CPT | Mod: CPTII,S$GLB,,

## 2022-12-30 PROCEDURE — 1159F PR MEDICATION LIST DOCUMENTED IN MEDICAL RECORD: ICD-10-PCS | Mod: CPTII,S$GLB,,

## 2022-12-30 PROCEDURE — 3079F DIAST BP 80-89 MM HG: CPT | Mod: CPTII,S$GLB,,

## 2022-12-30 PROCEDURE — 3044F HG A1C LEVEL LT 7.0%: CPT | Mod: CPTII,S$GLB,,

## 2022-12-30 PROCEDURE — 3008F PR BODY MASS INDEX (BMI) DOCUMENTED: ICD-10-PCS | Mod: CPTII,S$GLB,,

## 2022-12-30 PROCEDURE — 1101F PT FALLS ASSESS-DOCD LE1/YR: CPT | Mod: CPTII,S$GLB,,

## 2022-12-30 PROCEDURE — 3288F FALL RISK ASSESSMENT DOCD: CPT | Mod: CPTII,S$GLB,,

## 2022-12-30 PROCEDURE — 3288F PR FALLS RISK ASSESSMENT DOCUMENTED: ICD-10-PCS | Mod: CPTII,S$GLB,,

## 2022-12-30 PROCEDURE — 3075F PR MOST RECENT SYSTOLIC BLOOD PRESS GE 130-139MM HG: ICD-10-PCS | Mod: CPTII,S$GLB,,

## 2022-12-30 PROCEDURE — 1159F MED LIST DOCD IN RCRD: CPT | Mod: CPTII,S$GLB,,

## 2022-12-30 PROCEDURE — 3075F SYST BP GE 130 - 139MM HG: CPT | Mod: CPTII,S$GLB,,

## 2022-12-30 PROCEDURE — 1125F AMNT PAIN NOTED PAIN PRSNT: CPT | Mod: CPTII,S$GLB,,

## 2022-12-30 PROCEDURE — 51798 US URINE CAPACITY MEASURE: CPT | Mod: S$GLB,,,

## 2022-12-30 RX ORDER — TAMSULOSIN HYDROCHLORIDE 0.4 MG/1
0.4 CAPSULE ORAL EVERY MORNING
Qty: 90 CAPSULE | Refills: 3 | Status: SHIPPED | OUTPATIENT
Start: 2022-12-30 | End: 2023-09-12 | Stop reason: SDUPTHER

## 2022-12-30 RX ORDER — FINASTERIDE 5 MG/1
5 TABLET, FILM COATED ORAL DAILY
Qty: 90 TABLET | Refills: 3 | Status: SHIPPED | OUTPATIENT
Start: 2022-12-30 | End: 2022-12-30 | Stop reason: SINTOL

## 2022-12-30 RX ORDER — SILDENAFIL 100 MG/1
100 TABLET, FILM COATED ORAL DAILY PRN
Qty: 10 TABLET | Refills: 2 | Status: SHIPPED | OUTPATIENT
Start: 2022-12-30 | End: 2023-03-28 | Stop reason: SDUPTHER

## 2022-12-30 NOTE — PATIENT INSTRUCTIONS
[phone]   Please call [facility] and ask for Medical Records to be sent to [facility].

## 2022-12-30 NOTE — PROGRESS NOTES
CHIEF COMPLAINT:  Establishing care    HISTORY OF PRESENTING ILLINESS:  Jono Gonzalez is a 67 y.o. male new to [facility]. He was under the care of Dr. Romain Lopez who has now retired. He is here to establish care. He has BPH with LUTS, ED, arthritis, cataract, DM, gout and HTN. He has been on Flomax and Proscar since 2017 due to an episode of urinary retention following back surgery. He developed a breast lump and tenderness after beginning Proscar - surgically removed in 2017. He is experiencing breast tenderness again. His wife states he was supposed to have 6 month mammograms performed. I do not see a mammogram in his chart. States he has had an elevated PSA in the past - not elevated enough to perform MRI prostate or prostate biopsy. He is unsure of his last PSA result - no PSA results in chart. He takes 40 mg Lasix every night. He has been experiencing some urgency with incontinence x 1 month. He is experiencing ED. UA dip WNL, PVR 41 ml.     No family history of prostate, bladder or kidney cancer. No personal or family hx of kidney stones.    Unable to see Dr. Lopez's notes. Informed pt and his partner that medical records from [facility] are needed.    REVIEW OF SYSTEMS:  Review of Systems   Constitutional:  Negative for chills and fever.   HENT:  Negative for congestion and sore throat.    Respiratory:  Negative for cough and shortness of breath.    Cardiovascular:  Negative for chest pain and palpitations.   Gastrointestinal:  Negative for nausea and vomiting.   Genitourinary:  Positive for frequency and urgency. Negative for dysuria, flank pain and hematuria.   Neurological:  Negative for dizziness and headaches.       PATIENT HISTORY:    Past Medical History:   Diagnosis Date    Arthritis     Cataract     Diabetes mellitus     Gout attack     Hypertension        Past Surgical History:   Procedure Laterality Date    BACK SURGERY      lumbar fusion      X 2       Family History   Problem Relation Age  of Onset    Diabetes Mother     Heart disease Mother     Thyroid disease Mother     Diabetes Sister     Heart disease Father     Diabetes Father     Heart disease Brother     Heart disease Sister     Diabetes Sister     Stroke Sister        Social History     Socioeconomic History    Marital status:    Tobacco Use    Smoking status: Never    Smokeless tobacco: Never   Substance and Sexual Activity    Alcohol use: Yes     Comment: occ    Drug use: No       Allergies:  Patient has no known allergies.    Medications:    Current Outpatient Medications:     allopurinoL (ZYLOPRIM) 300 MG tablet, TAKE 1 TABLET(300 MG) BY MOUTH EVERY DAY, Disp: 90 tablet, Rfl: 3    amLODIPine (NORVASC) 10 MG tablet, Take 10 mg by mouth once daily., Disp: , Rfl:     anastrozole (ARIMIDEX) 1 mg Tab, Take 1 mg by mouth once daily., Disp: , Rfl:     aspirin (ECOTRIN) 81 MG EC tablet, Take 81 mg by mouth once daily., Disp: , Rfl:     atorvastatin (LIPITOR) 40 MG tablet, Take 1 tablet (40 mg total) by mouth every evening. For cholesterol, Disp: 90 tablet, Rfl: 3    benzonatate (TESSALON) 200 MG capsule, Take 1 capsule (200 mg total) by mouth 3 (three) times daily as needed for Cough., Disp: 30 capsule, Rfl: 0    clotrimazole-betamethasone 1-0.05% (LOTRISONE) cream, Apply topically once daily. To feet and toenails, Disp: 45 g, Rfl: 1    furosemide (LASIX) 40 MG tablet, Take 1 tablet (40 mg total) by mouth 2 (two) times a day., Disp: 90 tablet, Rfl: 3    HYDROcodone-acetaminophen (NORCO)  mg per tablet, Take 1 tablet by mouth., Disp: , Rfl:     hydrocodone-acetaminophen 5-325mg (NORCO) 5-325 mg per tablet, TK 1 T PO  Q 6 TO 8 H, Disp: , Rfl: 0    ibuprofen (ADVIL,MOTRIN) 600 MG tablet, TAKE 1 TABLET(600 MG) BY MOUTH EVERY 6 HOURS WITH FOOD AS NEEDED FOR PAIN OR INFLAMMATION, Disp: 90 tablet, Rfl: 2    metFORMIN (GLUCOPHAGE-XR) 500 MG ER 24hr tablet, Take 1 tablet (500 mg total) by mouth 2 (two) times daily with meals., Disp: 180  tablet, Rfl: 3    olmesartan (BENICAR) 40 MG tablet, Take 1 tablet (40 mg total) by mouth once daily. Blood pressure, Disp: 90 tablet, Rfl: 3    pregabalin (LYRICA) 100 MG capsule, TAKE 1 CAPSULE(100 MG) BY MOUTH THREE TIMES DAILY, Disp: 90 capsule, Rfl: 1    promethazine-dextromethorphan (PROMETHAZINE-DM) 6.25-15 mg/5 mL Syrp, Take 5 mLs by mouth every 4 (four) hours as needed (cough)., Disp: 118 mL, Rfl: 0    semaglutide (OZEMPIC) 0.25 mg or 0.5 mg(2 mg/1.5 mL) pen injector, Inject 0.5 mg into the skin every 7 days., Disp: 2 pen, Rfl: 3    sildenafiL (VIAGRA) 100 MG tablet, Take 1 tablet (100 mg total) by mouth daily as needed for Erectile Dysfunction (take on an empty stomach 1 hour prior to sex)., Disp: 10 tablet, Rfl: 2    tamsulosin (FLOMAX) 0.4 mg Cap, Take 1 capsule (0.4 mg total) by mouth every morning., Disp: 90 capsule, Rfl: 3    PHYSICAL EXAMINATION:  Physical Exam  Constitutional:       Appearance: Normal appearance.   HENT:      Head: Normocephalic and atraumatic.      Right Ear: External ear normal.      Left Ear: External ear normal.   Pulmonary:      Effort: Pulmonary effort is normal. No respiratory distress.   Genitourinary:     Prostate: Enlarged. Not tender and no nodules present.      Rectum: Normal.   Skin:     General: Skin is warm and dry.   Neurological:      General: No focal deficit present.      Mental Status: He is alert and oriented to person, place, and time.   Psychiatric:         Mood and Affect: Mood normal.         Behavior: Behavior normal.         IMPRESSION:    Encounter Diagnoses   Name Primary?    Male hypogonadism Yes    Frequency of urination     Prostate cancer     Prostate cancer screening     Benign prostatic hyperplasia, unspecified whether lower urinary tract symptoms present     Erectile dysfunction, unspecified erectile dysfunction type        Assessment:       1. Male hypogonadism    2. Frequency of urination    3. Prostate cancer    4. Prostate cancer screening     5. Benign prostatic hyperplasia, unspecified whether lower urinary tract symptoms present    6. Erectile dysfunction, unspecified erectile dysfunction type        Plan:   - PSA today  - Reach out to PCP for mammogram   - Flomax sent to pharmacy of choice (SE discussed, retrograde ejaculation)  - Proscar discontinued due to breast tenderness and tissue enlargement   - Referral placed to Dr. Michael to discuss Rezum or TURP, pt interested in surgical intervention.  - Bladder retraining information printed and discussed in office. If bladder retraining does not help symptoms can consider OAB medication   - Pt to obtain medical records from [facility]   - 50 mg Viagra sent to pharmacy of choice     Follow up in 3 months to discuss Viagra efficacy     I spent 45 minutes with the patient of which more than half was spent in direct consultation with the patient in regards to our treatment and plan.  We addressed the office findings and recent labs.   Education and recommendations of today's plan of care including home remedies and needed follow up with PCP.   We discussed the chief complaint/LUTS and the possible contributory factors.   Recommended lifestyle modifications with proper, healthy diet, good hydration if no fluid restrictions; reducing bladder irritants.   Benefits of regular exercise approved by PCP.

## 2023-01-03 ENCOUNTER — PATIENT MESSAGE (OUTPATIENT)
Dept: PRIMARY CARE CLINIC | Facility: CLINIC | Age: 68
End: 2023-01-03
Payer: COMMERCIAL

## 2023-01-10 ENCOUNTER — OFFICE VISIT (OUTPATIENT)
Dept: CARDIOLOGY | Facility: CLINIC | Age: 68
End: 2023-01-10
Payer: COMMERCIAL

## 2023-01-10 ENCOUNTER — TELEPHONE (OUTPATIENT)
Dept: CARDIOLOGY | Facility: CLINIC | Age: 68
End: 2023-01-10

## 2023-01-10 VITALS
RESPIRATION RATE: 20 BRPM | HEART RATE: 89 BPM | WEIGHT: 308 LBS | SYSTOLIC BLOOD PRESSURE: 161 MMHG | DIASTOLIC BLOOD PRESSURE: 86 MMHG | HEIGHT: 67 IN | BODY MASS INDEX: 48.34 KG/M2

## 2023-01-10 DIAGNOSIS — E78.2 MIXED HYPERLIPIDEMIA: ICD-10-CM

## 2023-01-10 DIAGNOSIS — I87.2 CHRONIC VENOUS INSUFFICIENCY: Primary | ICD-10-CM

## 2023-01-10 DIAGNOSIS — I10 PRIMARY HYPERTENSION: ICD-10-CM

## 2023-01-10 PROCEDURE — 3077F SYST BP >= 140 MM HG: CPT | Mod: CPTII,S$GLB,, | Performed by: INTERNAL MEDICINE

## 2023-01-10 PROCEDURE — 3288F PR FALLS RISK ASSESSMENT DOCUMENTED: ICD-10-PCS | Mod: CPTII,S$GLB,, | Performed by: INTERNAL MEDICINE

## 2023-01-10 PROCEDURE — 1159F MED LIST DOCD IN RCRD: CPT | Mod: CPTII,S$GLB,, | Performed by: INTERNAL MEDICINE

## 2023-01-10 PROCEDURE — 99214 OFFICE O/P EST MOD 30 MIN: CPT | Mod: S$GLB,,, | Performed by: INTERNAL MEDICINE

## 2023-01-10 PROCEDURE — 3077F PR MOST RECENT SYSTOLIC BLOOD PRESSURE >= 140 MM HG: ICD-10-PCS | Mod: CPTII,S$GLB,, | Performed by: INTERNAL MEDICINE

## 2023-01-10 PROCEDURE — 1101F PR PT FALLS ASSESS DOC 0-1 FALLS W/OUT INJ PAST YR: ICD-10-PCS | Mod: CPTII,S$GLB,, | Performed by: INTERNAL MEDICINE

## 2023-01-10 PROCEDURE — 3079F PR MOST RECENT DIASTOLIC BLOOD PRESSURE 80-89 MM HG: ICD-10-PCS | Mod: CPTII,S$GLB,, | Performed by: INTERNAL MEDICINE

## 2023-01-10 PROCEDURE — 3288F FALL RISK ASSESSMENT DOCD: CPT | Mod: CPTII,S$GLB,, | Performed by: INTERNAL MEDICINE

## 2023-01-10 PROCEDURE — 99214 PR OFFICE/OUTPT VISIT, EST, LEVL IV, 30-39 MIN: ICD-10-PCS | Mod: S$GLB,,, | Performed by: INTERNAL MEDICINE

## 2023-01-10 PROCEDURE — 1159F PR MEDICATION LIST DOCUMENTED IN MEDICAL RECORD: ICD-10-PCS | Mod: CPTII,S$GLB,, | Performed by: INTERNAL MEDICINE

## 2023-01-10 PROCEDURE — 3072F LOW RISK FOR RETINOPATHY: CPT | Mod: CPTII,S$GLB,, | Performed by: INTERNAL MEDICINE

## 2023-01-10 PROCEDURE — 1126F AMNT PAIN NOTED NONE PRSNT: CPT | Mod: CPTII,S$GLB,, | Performed by: INTERNAL MEDICINE

## 2023-01-10 PROCEDURE — 3008F PR BODY MASS INDEX (BMI) DOCUMENTED: ICD-10-PCS | Mod: CPTII,S$GLB,, | Performed by: INTERNAL MEDICINE

## 2023-01-10 PROCEDURE — 1126F PR PAIN SEVERITY QUANTIFIED, NO PAIN PRESENT: ICD-10-PCS | Mod: CPTII,S$GLB,, | Performed by: INTERNAL MEDICINE

## 2023-01-10 PROCEDURE — 3072F PR LOW RISK FOR RETINOPATHY: ICD-10-PCS | Mod: CPTII,S$GLB,, | Performed by: INTERNAL MEDICINE

## 2023-01-10 PROCEDURE — 99999 PR PBB SHADOW E&M-EST. PATIENT-LVL IV: ICD-10-PCS | Mod: PBBFAC,,, | Performed by: INTERNAL MEDICINE

## 2023-01-10 PROCEDURE — 1101F PT FALLS ASSESS-DOCD LE1/YR: CPT | Mod: CPTII,S$GLB,, | Performed by: INTERNAL MEDICINE

## 2023-01-10 PROCEDURE — 1160F RVW MEDS BY RX/DR IN RCRD: CPT | Mod: CPTII,S$GLB,, | Performed by: INTERNAL MEDICINE

## 2023-01-10 PROCEDURE — 1160F PR REVIEW ALL MEDS BY PRESCRIBER/CLIN PHARMACIST DOCUMENTED: ICD-10-PCS | Mod: CPTII,S$GLB,, | Performed by: INTERNAL MEDICINE

## 2023-01-10 PROCEDURE — 3008F BODY MASS INDEX DOCD: CPT | Mod: CPTII,S$GLB,, | Performed by: INTERNAL MEDICINE

## 2023-01-10 PROCEDURE — 3079F DIAST BP 80-89 MM HG: CPT | Mod: CPTII,S$GLB,, | Performed by: INTERNAL MEDICINE

## 2023-01-10 PROCEDURE — 99999 PR PBB SHADOW E&M-EST. PATIENT-LVL IV: CPT | Mod: PBBFAC,,, | Performed by: INTERNAL MEDICINE

## 2023-01-10 NOTE — PROGRESS NOTES
HISTORY:    68-year-old male with a history of venous insufficiency, bilateral lower extremity lymphedema, diabetes, hypertension, hyperlipidemia, obesity, and DAVID presenting for follow-up.  The patient usually follows in vascular clinic with Dr. Ashby and is followed by Dr. Linn in Cardiology Clinic.     He comes in today for vascular med follow-up. Has been followed by Dr. Ashby for 2 years prior to visit with me for his venous insufficiency.    He has B LE edema for years managed with daily compression stocking use (8+ hours/day for 7 years plus) and furosemide 40x1. He had a 2nd LLE ulcer that has healed. He has been dealing with venous insufficiency for many years. Compression stockings and diuretic therapy as well as lifestyle modifications help significantly. Edema is minimal in the morning and progresses throughout the day. Sometimes he has trouble putting shoes on.    The patient denies any previous history of myocardial infarction, coronary artery disease, peripheral arterial disease, stroke, congestive heart failure, or cardiomyopathy.       MEDICATIONS:      Current Outpatient Medications:     allopurinoL (ZYLOPRIM) 300 MG tablet, TAKE 1 TABLET(300 MG) BY MOUTH EVERY DAY, Disp: 90 tablet, Rfl: 3    anastrozole (ARIMIDEX) 1 mg Tab, Take 1 mg by mouth once daily., Disp: , Rfl:     aspirin (ECOTRIN) 81 MG EC tablet, Take 81 mg by mouth once daily., Disp: , Rfl:     atorvastatin (LIPITOR) 40 MG tablet, Take 1 tablet (40 mg total) by mouth every evening. For cholesterol, Disp: 90 tablet, Rfl: 3    clotrimazole-betamethasone 1-0.05% (LOTRISONE) cream, Apply topically once daily. To feet and toenails, Disp: 45 g, Rfl: 1    furosemide (LASIX) 40 MG tablet, Take 1 tablet (40 mg total) by mouth 2 (two) times a day., Disp: 90 tablet, Rfl: 3    HYDROcodone-acetaminophen (NORCO)  mg per tablet, Take 1 tablet by mouth., Disp: , Rfl:     hydrocodone-acetaminophen 5-325mg (NORCO) 5-325 mg per tablet, TK 1  T PO  Q 6 TO 8 H, Disp: , Rfl: 0    ibuprofen (ADVIL,MOTRIN) 600 MG tablet, TAKE 1 TABLET(600 MG) BY MOUTH EVERY 6 HOURS WITH FOOD AS NEEDED FOR PAIN OR INFLAMMATION, Disp: 90 tablet, Rfl: 2    metFORMIN (GLUCOPHAGE-XR) 500 MG ER 24hr tablet, Take 1 tablet (500 mg total) by mouth 2 (two) times daily with meals., Disp: 180 tablet, Rfl: 3    olmesartan (BENICAR) 40 MG tablet, Take 1 tablet (40 mg total) by mouth once daily. Blood pressure, Disp: 90 tablet, Rfl: 3    pregabalin (LYRICA) 100 MG capsule, TAKE 1 CAPSULE(100 MG) BY MOUTH THREE TIMES DAILY, Disp: 90 capsule, Rfl: 1    sildenafiL (VIAGRA) 100 MG tablet, Take 1 tablet (100 mg total) by mouth daily as needed for Erectile Dysfunction (take on an empty stomach 1 hour prior to sex)., Disp: 10 tablet, Rfl: 2    tamsulosin (FLOMAX) 0.4 mg Cap, Take 1 capsule (0.4 mg total) by mouth every morning., Disp: 90 capsule, Rfl: 3      PHYSICAL EXAM:    Vitals:    01/10/23 1325   BP: (!) 161/86   Pulse: 89   Resp: 20       NAD, A+Ox3.  No jvd, no bruit.  RRR nml s1,s2. No murmurs.  CTA B no wheezes or crackles.  2+ B radial and 1+ B DP/PT.  B LE edema. Toes spared. Healed anterior shin and distal posterior calf ulcer. Chronic bilateral le lipodermatosclerosis.    LABS/STUDIES (imaging reviewed during clinic visit):    June 2022 hemoglobin 13.9.  Creatinine 0.9 with BUN of 15.  LDL 59 and HDL 54.  A1c 7.1.  ECG 2021 demonstrates sinus rhythm with no Q-waves or ST changes.  Right bundle-branch block.    ARIANA 2021 0.73/0.76.  Normal TBIs bilaterally.  TTE 2021 Nml LV size and fxn. CVP 3.   PET stress 2021 No e/o ischemia. Nml LVEF.  Arterial duplex June 2022 normal ARIANA bilaterally with no evidence of significant stenosis.  Venous duplex June 2022 shows no evidence of DVT bilaterally.  2021 shows no evidence of DVT with wide opne bilateral GSV and left SSV reflux.    ASSESSMENT & PLAN:    1. Chronic venous insufficiency    2. Primary hypertension    3. Mixed hyperlipidemia    4.  BMI 45.0-49.9, adult        Orders Placed This Encounter    CV Chemical Adhesive Treatment - Single Vein        C5, Ep, As, Pr LLE. C4b RLE. VCSS 15. Bilateral GSV reflux with L SSV reflux as well. This is despite daily compression stocking use for years. In addition to recurrent LLE ulcers, he has had issues with cellulitis.      Recommended LLE GSV/SSV ablation. Was having issues with insurance for unclear issues. Will resubmit as patient has significant symptoms 2/2 venous insufficiency despite years of conservative therapy.    The patient has no allergies to cyanoacrylate. Will setup for NTNT of his L GSV.     Average Bps usually better controlled on olmesartan 40x1 and furosemide 40x1. Stopping amlodipine helped improve swelling. If Bps remain up can try beta blocker or spironolactone.     LDL at goal on atorvastatin 40x1.      Must lose weight. Discussed importance of this.     Shaheen Garcia MD

## 2023-01-10 NOTE — TELEPHONE ENCOUNTER
----- Message from Shaheen Garcia MD sent at 1/10/2023  2:01 PM CST -----  Let's schedule for LLE GSV ablation again.

## 2023-01-11 ENCOUNTER — HOSPITAL ENCOUNTER (OUTPATIENT)
Dept: RADIOLOGY | Facility: HOSPITAL | Age: 68
Discharge: HOME | End: 2023-01-11
Attending: PHYSICIAN ASSISTANT
Payer: COMMERCIAL

## 2023-01-11 ENCOUNTER — OFFICE VISIT (OUTPATIENT)
Dept: ORTHOPEDICS | Facility: CLINIC | Age: 68
End: 2023-01-11
Payer: COMMERCIAL

## 2023-01-11 VITALS — HEIGHT: 67 IN | BODY MASS INDEX: 47.56 KG/M2 | WEIGHT: 303 LBS

## 2023-01-11 DIAGNOSIS — M10.9 GOUTY ARTHROPATHY: ICD-10-CM

## 2023-01-11 DIAGNOSIS — M17.0 PRIMARY OSTEOARTHRITIS OF BOTH KNEES: ICD-10-CM

## 2023-01-11 DIAGNOSIS — I87.2 CHRONIC VENOUS INSUFFICIENCY: ICD-10-CM

## 2023-01-11 DIAGNOSIS — E66.01 MORBID OBESITY: ICD-10-CM

## 2023-01-11 DIAGNOSIS — R52 PAIN: ICD-10-CM

## 2023-01-11 DIAGNOSIS — L03.116 CELLULITIS OF LEFT LOWER EXTREMITY: ICD-10-CM

## 2023-01-11 DIAGNOSIS — E11.9 TYPE 2 DIABETES MELLITUS WITHOUT COMPLICATION, WITHOUT LONG-TERM CURRENT USE OF INSULIN: ICD-10-CM

## 2023-01-11 DIAGNOSIS — I45.10 RBBB: ICD-10-CM

## 2023-01-11 DIAGNOSIS — R52 PAIN: Primary | ICD-10-CM

## 2023-01-11 PROCEDURE — 3072F PR LOW RISK FOR RETINOPATHY: ICD-10-PCS | Mod: CPTII,S$GLB,, | Performed by: PHYSICIAN ASSISTANT

## 2023-01-11 PROCEDURE — 99204 OFFICE O/P NEW MOD 45 MIN: CPT | Mod: 25,S$GLB,, | Performed by: PHYSICIAN ASSISTANT

## 2023-01-11 PROCEDURE — 1125F AMNT PAIN NOTED PAIN PRSNT: CPT | Mod: CPTII,S$GLB,, | Performed by: PHYSICIAN ASSISTANT

## 2023-01-11 PROCEDURE — 1159F MED LIST DOCD IN RCRD: CPT | Mod: CPTII,S$GLB,, | Performed by: PHYSICIAN ASSISTANT

## 2023-01-11 PROCEDURE — 99999 PR PBB SHADOW E&M-EST. PATIENT-LVL III: CPT | Mod: PBBFAC,,, | Performed by: PHYSICIAN ASSISTANT

## 2023-01-11 PROCEDURE — 73562 X-RAY EXAM OF KNEE 3: CPT | Mod: TC,50

## 2023-01-11 PROCEDURE — 1160F RVW MEDS BY RX/DR IN RCRD: CPT | Mod: CPTII,S$GLB,, | Performed by: PHYSICIAN ASSISTANT

## 2023-01-11 PROCEDURE — 20610 DRAIN/INJ JOINT/BURSA W/O US: CPT | Mod: RT,S$GLB,, | Performed by: PHYSICIAN ASSISTANT

## 2023-01-11 PROCEDURE — 73562 X-RAY EXAM OF KNEE 3: CPT | Mod: 26,,, | Performed by: RADIOLOGY

## 2023-01-11 PROCEDURE — 20610 PR DRAIN/INJECT LARGE JOINT/BURSA: ICD-10-PCS | Mod: RT,S$GLB,, | Performed by: PHYSICIAN ASSISTANT

## 2023-01-11 PROCEDURE — 3288F FALL RISK ASSESSMENT DOCD: CPT | Mod: CPTII,S$GLB,, | Performed by: PHYSICIAN ASSISTANT

## 2023-01-11 PROCEDURE — 1101F PT FALLS ASSESS-DOCD LE1/YR: CPT | Mod: CPTII,S$GLB,, | Performed by: PHYSICIAN ASSISTANT

## 2023-01-11 PROCEDURE — 1101F PR PT FALLS ASSESS DOC 0-1 FALLS W/OUT INJ PAST YR: ICD-10-PCS | Mod: CPTII,S$GLB,, | Performed by: PHYSICIAN ASSISTANT

## 2023-01-11 PROCEDURE — 1159F PR MEDICATION LIST DOCUMENTED IN MEDICAL RECORD: ICD-10-PCS | Mod: CPTII,S$GLB,, | Performed by: PHYSICIAN ASSISTANT

## 2023-01-11 PROCEDURE — 3288F PR FALLS RISK ASSESSMENT DOCUMENTED: ICD-10-PCS | Mod: CPTII,S$GLB,, | Performed by: PHYSICIAN ASSISTANT

## 2023-01-11 PROCEDURE — 1125F PR PAIN SEVERITY QUANTIFIED, PAIN PRESENT: ICD-10-PCS | Mod: CPTII,S$GLB,, | Performed by: PHYSICIAN ASSISTANT

## 2023-01-11 PROCEDURE — 3008F BODY MASS INDEX DOCD: CPT | Mod: CPTII,S$GLB,, | Performed by: PHYSICIAN ASSISTANT

## 2023-01-11 PROCEDURE — 73562 XR KNEE ORTHO BILAT: ICD-10-PCS | Mod: 26,,, | Performed by: RADIOLOGY

## 2023-01-11 PROCEDURE — 3072F LOW RISK FOR RETINOPATHY: CPT | Mod: CPTII,S$GLB,, | Performed by: PHYSICIAN ASSISTANT

## 2023-01-11 PROCEDURE — 3008F PR BODY MASS INDEX (BMI) DOCUMENTED: ICD-10-PCS | Mod: CPTII,S$GLB,, | Performed by: PHYSICIAN ASSISTANT

## 2023-01-11 PROCEDURE — 1160F PR REVIEW ALL MEDS BY PRESCRIBER/CLIN PHARMACIST DOCUMENTED: ICD-10-PCS | Mod: CPTII,S$GLB,, | Performed by: PHYSICIAN ASSISTANT

## 2023-01-11 PROCEDURE — 99204 PR OFFICE/OUTPT VISIT, NEW, LEVL IV, 45-59 MIN: ICD-10-PCS | Mod: 25,S$GLB,, | Performed by: PHYSICIAN ASSISTANT

## 2023-01-11 PROCEDURE — 99999 PR PBB SHADOW E&M-EST. PATIENT-LVL III: ICD-10-PCS | Mod: PBBFAC,,, | Performed by: PHYSICIAN ASSISTANT

## 2023-01-12 RX ORDER — METHYLPREDNISOLONE ACETATE 80 MG/ML
80 INJECTION, SUSPENSION INTRA-ARTICULAR; INTRALESIONAL; INTRAMUSCULAR; SOFT TISSUE
Status: COMPLETED | OUTPATIENT
Start: 2023-01-12 | End: 2023-01-12

## 2023-01-12 RX ADMIN — METHYLPREDNISOLONE ACETATE 80 MG: 80 INJECTION, SUSPENSION INTRA-ARTICULAR; INTRALESIONAL; INTRAMUSCULAR; SOFT TISSUE at 07:01

## 2023-01-12 NOTE — PROGRESS NOTES
SUBJECTIVE:     Chief Complaint & History of Present Illness:  Jono Gonzalez is a New patient 68 y.o. male who is seen here today with a complaint of    Chief Complaint   Patient presents with    Left Knee - Pain, Numbness    Right Knee - Pain, Swelling, Numbness     This one hurts the most    .  Patient is here today for evaluation treatment longstanding bilateral knee pain right much greater left.  States he is had problems with his knees dating back multiple years has had intermittent treatments with over-the-counter NSAIDs as well as prescription with short-term relief.  States his symptoms have now progressed to the point he has difficulty with negotiating stairs periods of standing ambulation and rising from seated position.  Right knee swells and has decreased sensation below the knee with swelling is usually relieved when the swelling goes down.  On a scale of 1-10, with 10 being worst pain imaginable, he rates this pain as 5 on good days and 8 on bad days.  he describes the pain as sore and grinding.    Review of patient's allergies indicates:  No Known Allergies      Current Outpatient Medications   Medication Sig Dispense Refill    allopurinoL (ZYLOPRIM) 300 MG tablet TAKE 1 TABLET(300 MG) BY MOUTH EVERY DAY 90 tablet 3    anastrozole (ARIMIDEX) 1 mg Tab Take 1 mg by mouth once daily.      aspirin (ECOTRIN) 81 MG EC tablet Take 81 mg by mouth once daily.      atorvastatin (LIPITOR) 40 MG tablet Take 1 tablet (40 mg total) by mouth every evening. For cholesterol 90 tablet 3    clotrimazole-betamethasone 1-0.05% (LOTRISONE) cream Apply topically once daily. To feet and toenails 45 g 1    furosemide (LASIX) 40 MG tablet Take 1 tablet (40 mg total) by mouth 2 (two) times a day. 90 tablet 3    HYDROcodone-acetaminophen (NORCO)  mg per tablet Take 1 tablet by mouth.      hydrocodone-acetaminophen 5-325mg (NORCO) 5-325 mg per tablet TK 1 T PO  Q 6 TO 8 H  0    ibuprofen (ADVIL,MOTRIN) 600 MG tablet TAKE  1 TABLET(600 MG) BY MOUTH EVERY 6 HOURS WITH FOOD AS NEEDED FOR PAIN OR INFLAMMATION 90 tablet 2    metFORMIN (GLUCOPHAGE-XR) 500 MG ER 24hr tablet Take 1 tablet (500 mg total) by mouth 2 (two) times daily with meals. 180 tablet 3    olmesartan (BENICAR) 40 MG tablet Take 1 tablet (40 mg total) by mouth once daily. Blood pressure 90 tablet 3    pregabalin (LYRICA) 100 MG capsule TAKE 1 CAPSULE(100 MG) BY MOUTH THREE TIMES DAILY 90 capsule 1    sildenafiL (VIAGRA) 100 MG tablet Take 1 tablet (100 mg total) by mouth daily as needed for Erectile Dysfunction (take on an empty stomach 1 hour prior to sex). 10 tablet 2    tamsulosin (FLOMAX) 0.4 mg Cap Take 1 capsule (0.4 mg total) by mouth every morning. 90 capsule 3     No current facility-administered medications for this visit.       Past Medical History:   Diagnosis Date    Arthritis     Cataract     Diabetes mellitus     Gout attack     Hypertension        Past Surgical History:   Procedure Laterality Date    BACK SURGERY      lumbar fusion      X 2       Vital Signs (Most Recent)  There were no vitals filed for this visit.        Review of Systems:  ROS:  Constitutional: no fever or chills, positive structure sleep apnea  Eyes: no visual changes  ENT: no nasal congestion or sore throat  Respiratory: no cough or shortness of breath  Cardiovascular: no chest pain or palpitations, positive for right bundle-branch block chronic venous insufficiency  Gastrointestinal: no nausea or vomiting, tolerating diet  Genitourinary: no hematuria or dysuria, positive BPH  Integument/Breast: no rash or pruritis  Hematologic/Lymphatic: no easy bruising or lymphadenopathy, positive lymphedema bilateral lower extremities  Musculoskeletal: no arthralgias or myalgias, positive history of gout, chronic low back pain venous stasis ulcer lower extremity  Neurological: no seizures or tremors  Behavioral/Psych: no auditory or visual hallucinations, positive for narcotics  "dependency  Endocrine: no heat or cold intolerance, positive diabetes type 2, morbid obesity, hypogonadism,                OBJECTIVE:     PHYSICAL EXAM:  Height: 5' 7" (170.2 cm) Weight: (!) 137.5 kg (303 lb 0.4 oz), General Appearance: Well nourished, well developed, in no acute distress.  Neurological: Mood & affect are normal.    right  Knee Exam:  Knee Range of Motion:0-100 degrees flexion   Effusion:none  Condition of skin:intact  Location of tenderness:Lateral joint line   Strength:limited by pain and 5 of 5  Stability:  Lachman: stable, LCL: stable, MCL: stable, PCL: stable, and posteromedial (dial): stable  Varus /Valgus stress:   Moderate valgus  Anni:   negative/negative    left  Knee Exam:  Knee Range of Motion:0-110 degrees flexion   Effusion:none  Condition of skin:intact  Location of tenderness:Medial joint line   Strength:limited by pain and 5 of 5  Stability:  Lachman: stable, LCL: stable, MCL: stable, PCL: stable, and posteromedial (dial): stable  Varus /Valgus stress:  normal  Anni:   negative/negative      Hip Examination:  normal    RADIOGRAPHS:  X-rays of the knees taken today films reviewed by me demonstrate severe arthritic changes throughout both knees right much more than left with complete loss of lateral joint space marked sclerotic changes osteophytic spurring tricompartmentally most notable in the lateral compartment.  Left knee demonstrates moderate arthritic changes with 1-2 mm of medial joint space remaining sclerotic changes and osteophytic spurring noted tricompartmentally no evidence of fracture dislocation or other bony abnormalities    ASSESSMENT/PLAN:       ICD-10-CM ICD-9-CM   1. Pain  R52 780.96   2. Primary osteoarthritis of both knees  M17.0 715.16   3. Chronic venous insufficiency  I87.2 459.81   4. RBBB  I45.10 426.4   5. Cellulitis of left lower extremity  L03.116 682.6   6. Type 2 diabetes mellitus without complication, without long-term current use of insulin  " E11.9 250.00   7. Morbid obesity  E66.01 278.01   8. BMI 45.0-49.9, adult  Z68.42 V85.42   9. Gouty arthropathy  M10.9 274.00       Plan: We discussed with the patient at length all the different treatment options available for  the knee including anti-inflammatories, acetaminophen, rest, ice, knee strengthening exercise, occasional cortisone injections for temporary relief, Viscosupplimentation injections, arthroscopic debridement osteotomy, and finally knee arthroplasty.   Patient is aware he is in need of knee replacement surgery he is currently working with bariatric to get his weight down to acceptable level to move forward with surgery.  In the interim he is requested cortisone injection of the right knee to help alleviate his immediate symptoms until we can get him to a point he can move forward with TKA      The injection site was identified and the skin was prepared with a betadine solution. The   right  knee was injected with 1 ml of Depo-Medrol and 5 ml Lidocaine under sterile technique. Jono Gonzalez tolerated the procedure well, he was advised to rest the knee today, ice and elevation. he did receive immediate relief of the pain in and about his knee he was told this would be short lived and is secondary to the lidocaine. he may have an increase in his discomfort tonight followed by steady improvement over the next several days. I may take 1-3 weeks following the injection to get the full benefit of the medication.  I will see him back in 3 6 months. Sooner if he has any problems or concerns.    Jono Gonzalez was advised to monitor his blood sugars closely over the next several days. The steroid may cause a rise in them. If his glucose levels rise to a point he is uncomfortable or he is unable to control them is is to contact his PCP or go immediately to the emergency department.

## 2023-01-17 RX ORDER — SEMAGLUTIDE 1.34 MG/ML
1 INJECTION, SOLUTION SUBCUTANEOUS
Qty: 4 PEN | Refills: 2 | Status: SHIPPED | OUTPATIENT
Start: 2023-01-17 | End: 2024-01-29 | Stop reason: SDUPTHER

## 2023-02-02 ENCOUNTER — HOSPITAL ENCOUNTER (OUTPATIENT)
Dept: CARDIOLOGY | Facility: HOSPITAL | Age: 68
Discharge: HOME | End: 2023-02-02
Attending: INTERNAL MEDICINE
Payer: COMMERCIAL

## 2023-02-02 ENCOUNTER — TELEPHONE (OUTPATIENT)
Dept: CARDIOLOGY | Facility: HOSPITAL | Age: 68
End: 2023-02-02
Payer: COMMERCIAL

## 2023-02-02 VITALS — DIASTOLIC BLOOD PRESSURE: 73 MMHG | SYSTOLIC BLOOD PRESSURE: 145 MMHG | HEART RATE: 87 BPM

## 2023-02-02 DIAGNOSIS — I87.2 CHRONIC VENOUS INSUFFICIENCY: ICD-10-CM

## 2023-02-02 DIAGNOSIS — I87.2 CHRONIC VENOUS INSUFFICIENCY: Primary | ICD-10-CM

## 2023-02-02 PROCEDURE — C1888 ENDOVAS NON-CARDIAC ABL CATH: HCPCS

## 2023-02-02 PROCEDURE — C1894 INTRO/SHEATH, NON-LASER: HCPCS

## 2023-02-02 PROCEDURE — 27000932 CV CHEMICAL ADHESIVE TREATMENT - SINGLE VEIN (CUPID ONLY)

## 2023-02-02 PROCEDURE — 36482 CV CHEMICAL ADHESIVE TREATMENT - SINGLE VEIN (CUPID ONLY): ICD-10-PCS | Mod: ,,, | Performed by: INTERNAL MEDICINE

## 2023-02-02 PROCEDURE — 36482 ENDOVEN THER CHEM ADHES 1ST: CPT | Mod: ,,, | Performed by: INTERNAL MEDICINE

## 2023-02-06 ENCOUNTER — HOSPITAL ENCOUNTER (OUTPATIENT)
Dept: CARDIOLOGY | Facility: HOSPITAL | Age: 68
Discharge: HOME | End: 2023-02-06
Attending: INTERNAL MEDICINE
Payer: COMMERCIAL

## 2023-02-06 DIAGNOSIS — I87.2 CHRONIC VENOUS INSUFFICIENCY: ICD-10-CM

## 2023-02-06 PROCEDURE — 93971 EXTREMITY STUDY: CPT | Mod: LT

## 2023-02-06 PROCEDURE — 93971 EXTREMITY STUDY: CPT | Mod: 26,LT,, | Performed by: INTERNAL MEDICINE

## 2023-02-06 PROCEDURE — 93971 CV US DOPPLER VENOUS LEG LEFT (CUPID ONLY): ICD-10-PCS | Mod: 26,LT,, | Performed by: INTERNAL MEDICINE

## 2023-02-10 ENCOUNTER — TELEPHONE (OUTPATIENT)
Dept: PRIMARY CARE CLINIC | Facility: CLINIC | Age: 68
End: 2023-02-10
Payer: COMMERCIAL

## 2023-02-10 ENCOUNTER — PATIENT MESSAGE (OUTPATIENT)
Dept: PRIMARY CARE CLINIC | Facility: CLINIC | Age: 68
End: 2023-02-10
Payer: COMMERCIAL

## 2023-02-10 NOTE — TELEPHONE ENCOUNTER
----- Message from Wanda Coelho sent at 2/10/2023  1:21 PM CST -----  Contact: spouse/peng/[phone]  Pt wife called in regard to the pharmacy stated that they did not have the pt Rx for semaglutide (OZEMPIC) 1 mg/dose (4 mg/3 mL) 4 pen 2 1/17/2023 1/17/2024 No  Sig - Route: Inject 1 mg into the skin every 7 days.     Please advise

## 2023-02-10 NOTE — TELEPHONE ENCOUNTER
Spoke to pts spouse who states she was trying to get ozempic rx but she has since resolved issue w/ pharmacy.

## 2023-03-13 ENCOUNTER — PATIENT MESSAGE (OUTPATIENT)
Dept: PRIMARY CARE CLINIC | Facility: CLINIC | Age: 68
End: 2023-03-13
Payer: COMMERCIAL

## 2023-03-28 ENCOUNTER — OFFICE VISIT (OUTPATIENT)
Dept: UROLOGY | Facility: CLINIC | Age: 68
End: 2023-03-28
Payer: COMMERCIAL

## 2023-03-28 VITALS
WEIGHT: 299.81 LBS | BODY MASS INDEX: 47.06 KG/M2 | HEIGHT: 67 IN | SYSTOLIC BLOOD PRESSURE: 135 MMHG | DIASTOLIC BLOOD PRESSURE: 82 MMHG | HEART RATE: 77 BPM

## 2023-03-28 DIAGNOSIS — N40.1 BPH WITH OBSTRUCTION/LOWER URINARY TRACT SYMPTOMS: ICD-10-CM

## 2023-03-28 DIAGNOSIS — N52.9 ERECTILE DYSFUNCTION, UNSPECIFIED ERECTILE DYSFUNCTION TYPE: Primary | ICD-10-CM

## 2023-03-28 DIAGNOSIS — N13.8 BPH WITH OBSTRUCTION/LOWER URINARY TRACT SYMPTOMS: ICD-10-CM

## 2023-03-28 PROCEDURE — 99999 PR PBB SHADOW E&M-EST. PATIENT-LVL IV: CPT | Mod: PBBFAC,,,

## 2023-03-28 PROCEDURE — 3288F FALL RISK ASSESSMENT DOCD: CPT | Mod: CPTII,S$GLB,,

## 2023-03-28 PROCEDURE — 4010F PR ACE/ARB THEARPY RXD/TAKEN: ICD-10-PCS | Mod: CPTII,S$GLB,,

## 2023-03-28 PROCEDURE — 3075F SYST BP GE 130 - 139MM HG: CPT | Mod: CPTII,S$GLB,,

## 2023-03-28 PROCEDURE — 1159F MED LIST DOCD IN RCRD: CPT | Mod: CPTII,S$GLB,,

## 2023-03-28 PROCEDURE — 99214 OFFICE O/P EST MOD 30 MIN: CPT | Mod: S$GLB,,,

## 2023-03-28 PROCEDURE — 99999 PR PBB SHADOW E&M-EST. PATIENT-LVL IV: ICD-10-PCS | Mod: PBBFAC,,,

## 2023-03-28 PROCEDURE — 3079F DIAST BP 80-89 MM HG: CPT | Mod: CPTII,S$GLB,,

## 2023-03-28 PROCEDURE — 99214 PR OFFICE/OUTPT VISIT, EST, LEVL IV, 30-39 MIN: ICD-10-PCS | Mod: S$GLB,,,

## 2023-03-28 PROCEDURE — 3008F PR BODY MASS INDEX (BMI) DOCUMENTED: ICD-10-PCS | Mod: CPTII,S$GLB,,

## 2023-03-28 PROCEDURE — 1126F AMNT PAIN NOTED NONE PRSNT: CPT | Mod: CPTII,S$GLB,,

## 2023-03-28 PROCEDURE — 4010F ACE/ARB THERAPY RXD/TAKEN: CPT | Mod: CPTII,S$GLB,,

## 2023-03-28 PROCEDURE — 3075F PR MOST RECENT SYSTOLIC BLOOD PRESS GE 130-139MM HG: ICD-10-PCS | Mod: CPTII,S$GLB,,

## 2023-03-28 PROCEDURE — 3072F PR LOW RISK FOR RETINOPATHY: ICD-10-PCS | Mod: CPTII,S$GLB,,

## 2023-03-28 PROCEDURE — 3079F PR MOST RECENT DIASTOLIC BLOOD PRESSURE 80-89 MM HG: ICD-10-PCS | Mod: CPTII,S$GLB,,

## 2023-03-28 PROCEDURE — 3072F LOW RISK FOR RETINOPATHY: CPT | Mod: CPTII,S$GLB,,

## 2023-03-28 PROCEDURE — 1160F RVW MEDS BY RX/DR IN RCRD: CPT | Mod: CPTII,S$GLB,,

## 2023-03-28 PROCEDURE — 1101F PT FALLS ASSESS-DOCD LE1/YR: CPT | Mod: CPTII,S$GLB,,

## 2023-03-28 PROCEDURE — 3288F PR FALLS RISK ASSESSMENT DOCUMENTED: ICD-10-PCS | Mod: CPTII,S$GLB,,

## 2023-03-28 PROCEDURE — 1159F PR MEDICATION LIST DOCUMENTED IN MEDICAL RECORD: ICD-10-PCS | Mod: CPTII,S$GLB,,

## 2023-03-28 PROCEDURE — 3008F BODY MASS INDEX DOCD: CPT | Mod: CPTII,S$GLB,,

## 2023-03-28 PROCEDURE — 1126F PR PAIN SEVERITY QUANTIFIED, NO PAIN PRESENT: ICD-10-PCS | Mod: CPTII,S$GLB,,

## 2023-03-28 PROCEDURE — 1160F PR REVIEW ALL MEDS BY PRESCRIBER/CLIN PHARMACIST DOCUMENTED: ICD-10-PCS | Mod: CPTII,S$GLB,,

## 2023-03-28 PROCEDURE — 1101F PR PT FALLS ASSESS DOC 0-1 FALLS W/OUT INJ PAST YR: ICD-10-PCS | Mod: CPTII,S$GLB,,

## 2023-03-28 RX ORDER — NAPROXEN 500 MG/1
500 TABLET ORAL 2 TIMES DAILY PRN
COMMUNITY
Start: 2023-03-08

## 2023-03-28 RX ORDER — SILDENAFIL 100 MG/1
100 TABLET, FILM COATED ORAL DAILY PRN
Qty: 10 TABLET | Refills: 11 | Status: SHIPPED | OUTPATIENT
Start: 2023-03-28 | End: 2023-09-12 | Stop reason: SDUPTHER

## 2023-03-28 NOTE — PROGRESS NOTES
CHIEF COMPLAINT:  3 month ED follow up    HISTORY OF PRESENTING ILLINESS:  Jono Gonzalez is a 67 y.o. male est to [facility]. He established care with our dept 12/30/22. He was under the care of Dr. Romain Lopez who has now retired. He has BPH with LUTS, ED, arthritis, cataract, DM, gout and HTN. He has been on Flomax and Proscar since 2017 due to an episode of urinary retention following back surgery. He developed a breast lump and tenderness after beginning Proscar - surgically removed in 2017. He is experiencing breast tenderness again. His wife states he was supposed to have 6 month mammograms performed.  do not see a mammogram in his chart. States he has had an elevated PSA in the past - not elevated enough to perform MRI prostate or prostate biopsy. He is unsure of his last PSA result - no PSA results in chart. He takes 40 mg Lasix every night.     I prescribed a trial of sildenafil 100 mg PRN in December for ED. He states the medication is working well.   I discontinued finasteride in December due to breast tenderness and hx of surgical removal of breast lump in 2017.    No family history of prostate, bladder or kidney cancer. No personal or family hx of kidney stones.     Unable to see Dr. Lopez's notes. Informed pt and his partner that medical records [facility] are needed.       REVIEW OF SYSTEMS:  Review of Systems   Constitutional:  Negative for chills and fever.   HENT:  Negative for congestion and sore throat.    Respiratory:  Negative for cough and shortness of breath.    Cardiovascular:  Negative for chest pain and palpitations.   Gastrointestinal:  Negative for nausea and vomiting.   Genitourinary:  Positive for frequency and urgency. Negative for dysuria, flank pain and hematuria.   Neurological:  Negative for dizziness and headaches.       PATIENT HISTORY:    Past Medical History:   Diagnosis Date    Arthritis     Cataract     Diabetes mellitus     Gout attack     Hypertension        Past  Surgical History:   Procedure Laterality Date    BACK SURGERY      lumbar fusion      X 2       Family History   Problem Relation Age of Onset    Diabetes Mother     Heart disease Mother     Thyroid disease Mother     Diabetes Sister     Heart disease Father     Diabetes Father     Heart disease Brother     Heart disease Sister     Diabetes Sister     Stroke Sister        Social History     Socioeconomic History    Marital status:    Tobacco Use    Smoking status: Never    Smokeless tobacco: Never   Substance and Sexual Activity    Alcohol use: Yes     Comment: occ    Drug use: No       Allergies:  Patient has no known allergies.    Medications:    Current Outpatient Medications:     allopurinoL (ZYLOPRIM) 300 MG tablet, TAKE 1 TABLET(300 MG) BY MOUTH EVERY DAY, Disp: 90 tablet, Rfl: 3    anastrozole (ARIMIDEX) 1 mg Tab, Take 1 mg by mouth once daily., Disp: , Rfl:     aspirin (ECOTRIN) 81 MG EC tablet, Take 81 mg by mouth once daily., Disp: , Rfl:     atorvastatin (LIPITOR) 40 MG tablet, Take 1 tablet (40 mg total) by mouth every evening. For cholesterol, Disp: 90 tablet, Rfl: 3    clotrimazole-betamethasone 1-0.05% (LOTRISONE) cream, Apply topically once daily. To feet and toenails, Disp: 45 g, Rfl: 1    furosemide (LASIX) 40 MG tablet, Take 1 tablet (40 mg total) by mouth 2 (two) times a day., Disp: 90 tablet, Rfl: 3    HYDROcodone-acetaminophen (NORCO)  mg per tablet, Take 1 tablet by mouth., Disp: , Rfl:     hydrocodone-acetaminophen 5-325mg (NORCO) 5-325 mg per tablet, TK 1 T PO  Q 6 TO 8 H, Disp: , Rfl: 0    ibuprofen (ADVIL,MOTRIN) 600 MG tablet, TAKE 1 TABLET(600 MG) BY MOUTH EVERY 6 HOURS WITH FOOD AS NEEDED FOR PAIN OR INFLAMMATION, Disp: 90 tablet, Rfl: 2    metFORMIN (GLUCOPHAGE-XR) 500 MG ER 24hr tablet, Take 1 tablet (500 mg total) by mouth 2 (two) times daily with meals., Disp: 180 tablet, Rfl: 3    naproxen (NAPROSYN) 500 MG tablet, Take 500 mg by mouth 2 (two) times daily as needed.,  Disp: , Rfl:     olmesartan (BENICAR) 40 MG tablet, Take 1 tablet (40 mg total) by mouth once daily. Blood pressure, Disp: 90 tablet, Rfl: 3    pregabalin (LYRICA) 100 MG capsule, Take 1 capsule (100 mg total) by mouth 3 (three) times daily., Disp: 90 capsule, Rfl: 1    semaglutide (OZEMPIC) 1 mg/dose (4 mg/3 mL), Inject 1 mg into the skin every 7 days., Disp: 4 pen, Rfl: 2    tamsulosin (FLOMAX) 0.4 mg Cap, Take 1 capsule (0.4 mg total) by mouth every morning., Disp: 90 capsule, Rfl: 3    sildenafiL (VIAGRA) 100 MG tablet, Take 1 tablet (100 mg total) by mouth daily as needed for Erectile Dysfunction (take on an empty stomach 1 hour prior to sex)., Disp: 10 tablet, Rfl: 11    PHYSICAL EXAMINATION:  Physical Exam  Constitutional:       Appearance: Normal appearance.   HENT:      Head: Normocephalic and atraumatic.   Pulmonary:      Effort: Pulmonary effort is normal. No respiratory distress.   Skin:     General: Skin is warm and dry.   Neurological:      General: No focal deficit present.      Mental Status: He is alert and oriented to person, place, and time.   Psychiatric:         Mood and Affect: Mood normal.         Behavior: Behavior normal.         LABS:  UA yellow, sg 1.025, ph 5, trace protein    Lab Results   Component Value Date    PSADIAG 0.44 12/30/2022       Lab Results   Component Value Date    CREATININE 0.9 06/04/2022         IMPRESSION:  Encounter Diagnoses   Name Primary?    Erectile dysfunction, unspecified erectile dysfunction type Yes    BPH with obstruction/lower urinary tract symptoms          Assessment:       1. Erectile dysfunction, unspecified erectile dysfunction type    2. BPH with obstruction/lower urinary tract symptoms        Plan:   - continue sildenafil PRN, refills sent to pharmacy of choice   - continue Flomax    Pt has apt 4/18/23 with Dr. Michael to discuss BPH surgical options     I spent 30 minutes with the patient of which more than half was spent in direct consultation with the  patient in regards to our treatment and plan.  We addressed the office findings and recent labs.   Education and recommendations of today's plan of care including home remedies and needed follow up with PCP.   We discussed the chief complaint; reviewed the LUTS and the possible contributory factors.   Reassurance no infection.  Recommended lifestyle modifications with a proper, healthy diet, good hydration but during the day. Reducing bladder irritants.   Benefits of regular exercise.

## 2023-04-05 DIAGNOSIS — E11.9 TYPE 2 DIABETES MELLITUS WITHOUT COMPLICATION: ICD-10-CM

## 2023-04-12 ENCOUNTER — PATIENT MESSAGE (OUTPATIENT)
Dept: ADMINISTRATIVE | Facility: HOSPITAL | Age: 68
End: 2023-04-12
Payer: COMMERCIAL

## 2023-04-18 ENCOUNTER — TELEPHONE (OUTPATIENT)
Dept: UROLOGY | Facility: CLINIC | Age: 68
End: 2023-04-18
Payer: COMMERCIAL

## 2023-04-18 NOTE — TELEPHONE ENCOUNTER
----- Message from Maria Eugenia Mccoy LPN sent at 4/18/2023 11:57 AM CDT -----  Regarding: FW: appt  Contact: @ [phone]    ----- Message -----  From: Elvie Oro  Sent: 4/18/2023  11:47 AM CDT  To: Alec BATES Staff  Subject: appt                                             Pt is calling to reschedule appointment on today for another date  ...no available dates Pleaes call and adv @ [phone]

## 2023-04-25 ENCOUNTER — OFFICE VISIT (OUTPATIENT)
Dept: CARDIOLOGY | Facility: CLINIC | Age: 68
End: 2023-04-25
Payer: COMMERCIAL

## 2023-04-25 DIAGNOSIS — E11.9 TYPE 2 DIABETES MELLITUS WITHOUT COMPLICATION, WITHOUT LONG-TERM CURRENT USE OF INSULIN: ICD-10-CM

## 2023-04-25 DIAGNOSIS — E78.2 MIXED HYPERLIPIDEMIA: ICD-10-CM

## 2023-04-25 DIAGNOSIS — I10 PRIMARY HYPERTENSION: ICD-10-CM

## 2023-04-25 DIAGNOSIS — E66.01 MORBID OBESITY: ICD-10-CM

## 2023-04-25 DIAGNOSIS — I87.2 CHRONIC VENOUS INSUFFICIENCY: Primary | ICD-10-CM

## 2023-04-25 PROCEDURE — 3072F LOW RISK FOR RETINOPATHY: CPT | Mod: CPTII,95,, | Performed by: INTERNAL MEDICINE

## 2023-04-25 PROCEDURE — 1159F PR MEDICATION LIST DOCUMENTED IN MEDICAL RECORD: ICD-10-PCS | Mod: CPTII,95,, | Performed by: INTERNAL MEDICINE

## 2023-04-25 PROCEDURE — 99214 PR OFFICE/OUTPT VISIT, EST, LEVL IV, 30-39 MIN: ICD-10-PCS | Mod: 95,,, | Performed by: INTERNAL MEDICINE

## 2023-04-25 PROCEDURE — 99214 OFFICE O/P EST MOD 30 MIN: CPT | Mod: 95,,, | Performed by: INTERNAL MEDICINE

## 2023-04-25 PROCEDURE — 1160F RVW MEDS BY RX/DR IN RCRD: CPT | Mod: CPTII,95,, | Performed by: INTERNAL MEDICINE

## 2023-04-25 PROCEDURE — 3072F PR LOW RISK FOR RETINOPATHY: ICD-10-PCS | Mod: CPTII,95,, | Performed by: INTERNAL MEDICINE

## 2023-04-25 PROCEDURE — 1159F MED LIST DOCD IN RCRD: CPT | Mod: CPTII,95,, | Performed by: INTERNAL MEDICINE

## 2023-04-25 PROCEDURE — 1160F PR REVIEW ALL MEDS BY PRESCRIBER/CLIN PHARMACIST DOCUMENTED: ICD-10-PCS | Mod: CPTII,95,, | Performed by: INTERNAL MEDICINE

## 2023-04-25 PROCEDURE — 4010F ACE/ARB THERAPY RXD/TAKEN: CPT | Mod: CPTII,95,, | Performed by: INTERNAL MEDICINE

## 2023-04-25 PROCEDURE — 4010F PR ACE/ARB THEARPY RXD/TAKEN: ICD-10-PCS | Mod: CPTII,95,, | Performed by: INTERNAL MEDICINE

## 2023-04-26 DIAGNOSIS — E11.9 TYPE 2 DIABETES MELLITUS WITHOUT COMPLICATION: ICD-10-CM

## 2023-05-02 ENCOUNTER — TELEPHONE (OUTPATIENT)
Dept: PHARMACY | Facility: CLINIC | Age: 68
End: 2023-05-02
Payer: COMMERCIAL

## 2023-06-09 ENCOUNTER — PES CALL (OUTPATIENT)
Dept: ADMINISTRATIVE | Facility: CLINIC | Age: 68
End: 2023-06-09
Payer: COMMERCIAL

## 2023-06-23 ENCOUNTER — PATIENT MESSAGE (OUTPATIENT)
Dept: ADMINISTRATIVE | Facility: OTHER | Age: 68
End: 2023-06-23
Payer: COMMERCIAL

## 2023-07-24 ENCOUNTER — TELEPHONE (OUTPATIENT)
Dept: ADMINISTRATIVE | Facility: CLINIC | Age: 68
End: 2023-07-24
Payer: COMMERCIAL

## 2023-08-03 ENCOUNTER — TELEPHONE (OUTPATIENT)
Dept: PRIMARY CARE CLINIC | Facility: CLINIC | Age: 68
End: 2023-08-03
Payer: COMMERCIAL

## 2023-08-03 ENCOUNTER — PES CALL (OUTPATIENT)
Dept: ADMINISTRATIVE | Facility: CLINIC | Age: 68
End: 2023-08-03
Payer: COMMERCIAL

## 2023-08-03 NOTE — TELEPHONE ENCOUNTER
----- Message from Wendi Warner sent at 8/3/2023 12:46 PM CDT -----  Patient called in regards to a medication refill , please call back at [phone].        Thanks,  Wendi Warner

## 2023-08-08 RX ORDER — FUROSEMIDE 40 MG/1
40 TABLET ORAL 2 TIMES DAILY
Qty: 90 TABLET | Refills: 3 | OUTPATIENT
Start: 2023-08-08

## 2023-08-08 RX ORDER — FUROSEMIDE 40 MG/1
40 TABLET ORAL 2 TIMES DAILY
Qty: 90 TABLET | Refills: 3 | Status: SHIPPED | OUTPATIENT
Start: 2023-08-08

## 2023-08-08 NOTE — TELEPHONE ENCOUNTER
Care Due:                  Date            Visit Type   Department     Provider  --------------------------------------------------------------------------------                                EP -                              PRIMARY      [facility] PRIMARY  Last Visit: 06-      CARE ([facility])   ISABELLE FLORIAN  Next Visit: None Scheduled  None         None Found                                                            Last  Test          Frequency    Reason                     Performed    Due Date  --------------------------------------------------------------------------------    Office Visit  12 months..  allopurinoL,               06- 06-                             atorvastatin, metFORMIN,                             semaglutide..............    CBC.........  12 months..  allopurinoL..............  06- 05-    CMP.........  12 months..  allopurinoL,               06- 05-                             atorvastatin, metFORMIN..    HBA1C.......  6 months...  metFORMIN, semaglutide...  09- 03-    Lipid Panel.  12 months..  atorvastatin.............  04- 04-    Uric Acid...  12 months..  allopurinoL..............  10-   10-    Health [facility] Embedded Care Due Messages. Reference number: [id number].   8/08/2023 2:18:37 PM CDT

## 2023-08-08 NOTE — TELEPHONE ENCOUNTER
----- Message from Stefankathrine Sandeep sent at 8/8/2023 11:44 AM CDT -----  Contact: self [phone]  Requesting an RX refill or new RX.  Is this a refill or new RX: refill  RX name and strength (copy/paste from chart):  furosemide (LASIX) 40 MG tablet  Is this a 30 day or 90 day RX:   Pharmacy name and phone # (copy/paste from chart):    [id number] [address] [address]  Phone: [phone] Fax: [phone]      The doctors have asked that we provide their patients with the following 2 reminders -- prescription refills can take up to 72 hours, and a friendly reminder that in the future you can use your [facility] account to request refills: yes    Please call and advise

## 2023-08-09 ENCOUNTER — PATIENT MESSAGE (OUTPATIENT)
Dept: ADMINISTRATIVE | Facility: HOSPITAL | Age: 68
End: 2023-08-09
Payer: COMMERCIAL

## 2023-08-10 ENCOUNTER — PATIENT OUTREACH (OUTPATIENT)
Dept: ADMINISTRATIVE | Facility: OTHER | Age: 68
End: 2023-08-10
Payer: COMMERCIAL

## 2023-08-10 ENCOUNTER — PATIENT OUTREACH (OUTPATIENT)
Dept: ADMINISTRATIVE | Facility: HOSPITAL | Age: 68
End: 2023-08-10
Payer: COMMERCIAL

## 2023-08-10 NOTE — PROGRESS NOTES
CHW - Initial Contact    This Community Health Worker completed the Social Determinant of Health questionnaire with patient via telephone today.    Pt identified barriers of most importance are: Patient stated that he does not need assistance at this time    Referrals to community agencies completed with patient/caregiver consent outside of [facility] include: no  Referrals were put through [facility] - no:   Support and Services: Chw will follow up in a few weeks  Other information discussed the patient needs / wants help with: no   Follow up required:   Follow-up Outreach - Due: 12/28/2023

## 2023-08-10 NOTE — PROGRESS NOTES
Health Maintenance Due   Topic Date Due    Shingles Vaccine (2 of 3) 06/28/2016    Pneumococcal Vaccines (Age 65+) (3 - PPSV23 or PCV20) 11/28/2021    COVID-19 Vaccine (4 - Pfizer series) 12/27/2021    Foot Exam  09/13/2022    Diabetes Urine Screening  02/08/2023    Hemoglobin A1c  03/20/2023    Lipid Panel  04/22/2023    Eye Exam  07/27/2023      PATIENT IS GOING TO [facility] ON FRIDAY TO GET SET UP WITH DIGITAL MEDICINE.

## 2023-08-15 DIAGNOSIS — G57.93 NEUROPATHY INVOLVING BOTH LOWER EXTREMITIES: ICD-10-CM

## 2023-08-15 NOTE — TELEPHONE ENCOUNTER
No care due was identified.  Health [facility] Embedded Care Due Messages. Reference number: [id number].   8/15/2023 9:09:58 AM CDT

## 2023-08-16 RX ORDER — PREGABALIN 100 MG/1
CAPSULE ORAL
Qty: 90 CAPSULE | Refills: 1 | Status: SHIPPED | OUTPATIENT
Start: 2023-08-16 | End: 2023-10-03 | Stop reason: SDUPTHER

## 2023-08-24 ENCOUNTER — OFFICE VISIT (OUTPATIENT)
Dept: PRIMARY CARE CLINIC | Facility: CLINIC | Age: 68
End: 2023-08-24
Payer: COMMERCIAL

## 2023-08-24 ENCOUNTER — LAB VISIT (OUTPATIENT)
Dept: PRIMARY CARE CLINIC | Facility: CLINIC | Age: 68
End: 2023-08-24
Payer: COMMERCIAL

## 2023-08-24 VITALS
HEART RATE: 93 BPM | WEIGHT: 300.5 LBS | SYSTOLIC BLOOD PRESSURE: 135 MMHG | HEIGHT: 67 IN | BODY MASS INDEX: 47.16 KG/M2 | DIASTOLIC BLOOD PRESSURE: 84 MMHG

## 2023-08-24 DIAGNOSIS — E78.49 OTHER HYPERLIPIDEMIA: ICD-10-CM

## 2023-08-24 DIAGNOSIS — Z12.11 SCREEN FOR COLON CANCER: ICD-10-CM

## 2023-08-24 DIAGNOSIS — E11.9 TYPE 2 DIABETES MELLITUS WITHOUT COMPLICATION: ICD-10-CM

## 2023-08-24 DIAGNOSIS — M10.9 GOUTY ARTHROPATHY: ICD-10-CM

## 2023-08-24 DIAGNOSIS — E78.2 MIXED HYPERLIPIDEMIA: ICD-10-CM

## 2023-08-24 DIAGNOSIS — E11.9 DIABETIC EYE EXAM: ICD-10-CM

## 2023-08-24 DIAGNOSIS — E11.42 TYPE 2 DIABETES MELLITUS WITH DIABETIC POLYNEUROPATHY, UNSPECIFIED WHETHER LONG TERM INSULIN USE: ICD-10-CM

## 2023-08-24 DIAGNOSIS — E66.01 MORBID OBESITY WITH BMI OF 45.0-49.9, ADULT: ICD-10-CM

## 2023-08-24 DIAGNOSIS — Z76.0 ENCOUNTER FOR MEDICATION REFILL: Primary | ICD-10-CM

## 2023-08-24 DIAGNOSIS — Z01.00 DIABETIC EYE EXAM: ICD-10-CM

## 2023-08-24 DIAGNOSIS — I10 PRIMARY HYPERTENSION: ICD-10-CM

## 2023-08-24 DIAGNOSIS — E11.9 DIABETES MELLITUS WITHOUT COMPLICATION: ICD-10-CM

## 2023-08-24 LAB
ALBUMIN SERPL BCP-MCNC: 3.7 G/DL (ref 3.5–5.2)
ALP SERPL-CCNC: 93 U/L (ref 55–135)
ALT SERPL W/O P-5'-P-CCNC: 14 U/L (ref 10–44)
ANION GAP SERPL CALC-SCNC: 13 MMOL/L (ref 8–16)
AST SERPL-CCNC: 16 U/L (ref 10–40)
BILIRUB SERPL-MCNC: 0.6 MG/DL (ref 0.1–1)
BUN SERPL-MCNC: 15 MG/DL (ref 8–23)
CALCIUM SERPL-MCNC: 9.5 MG/DL (ref 8.7–10.5)
CHLORIDE SERPL-SCNC: 105 MMOL/L (ref 95–110)
CHOLEST SERPL-MCNC: 107 MG/DL (ref 120–199)
CHOLEST/HDLC SERPL: 2.1 {RATIO} (ref 2–5)
CO2 SERPL-SCNC: 27 MMOL/L (ref 23–29)
CREAT SERPL-MCNC: 1 MG/DL (ref 0.5–1.4)
EST. GFR  (NO RACE VARIABLE): >60 ML/MIN/1.73 M^2
GLUCOSE SERPL-MCNC: 51 MG/DL (ref 70–110)
HDLC SERPL-MCNC: 50 MG/DL (ref 40–75)
HDLC SERPL: 46.7 % (ref 20–50)
LDLC SERPL CALC-MCNC: 45.6 MG/DL (ref 63–159)
NONHDLC SERPL-MCNC: 57 MG/DL
POTASSIUM SERPL-SCNC: 4.4 MMOL/L (ref 3.5–5.1)
PROT SERPL-MCNC: 7.3 G/DL (ref 6–8.4)
SODIUM SERPL-SCNC: 145 MMOL/L (ref 136–145)
TRIGL SERPL-MCNC: 57 MG/DL (ref 30–150)

## 2023-08-24 PROCEDURE — 1126F PR PAIN SEVERITY QUANTIFIED, NO PAIN PRESENT: ICD-10-PCS | Mod: CPTII,S$GLB,, | Performed by: NURSE PRACTITIONER

## 2023-08-24 PROCEDURE — 3075F SYST BP GE 130 - 139MM HG: CPT | Mod: CPTII,S$GLB,, | Performed by: NURSE PRACTITIONER

## 2023-08-24 PROCEDURE — 80053 COMPREHEN METABOLIC PANEL: CPT | Performed by: FAMILY MEDICINE

## 2023-08-24 PROCEDURE — 99999 PR PBB SHADOW E&M-EST. PATIENT-LVL V: ICD-10-PCS | Mod: PBBFAC,,, | Performed by: NURSE PRACTITIONER

## 2023-08-24 PROCEDURE — 99213 OFFICE O/P EST LOW 20 MIN: CPT | Mod: S$GLB,,, | Performed by: NURSE PRACTITIONER

## 2023-08-24 PROCEDURE — 1101F PR PT FALLS ASSESS DOC 0-1 FALLS W/OUT INJ PAST YR: ICD-10-PCS | Mod: CPTII,S$GLB,, | Performed by: NURSE PRACTITIONER

## 2023-08-24 PROCEDURE — 1159F PR MEDICATION LIST DOCUMENTED IN MEDICAL RECORD: ICD-10-PCS | Mod: CPTII,S$GLB,, | Performed by: NURSE PRACTITIONER

## 2023-08-24 PROCEDURE — 99999 PR PBB SHADOW E&M-EST. PATIENT-LVL V: CPT | Mod: PBBFAC,,, | Performed by: NURSE PRACTITIONER

## 2023-08-24 PROCEDURE — 3079F PR MOST RECENT DIASTOLIC BLOOD PRESSURE 80-89 MM HG: ICD-10-PCS | Mod: CPTII,S$GLB,, | Performed by: NURSE PRACTITIONER

## 2023-08-24 PROCEDURE — 4010F PR ACE/ARB THEARPY RXD/TAKEN: ICD-10-PCS | Mod: CPTII,S$GLB,, | Performed by: NURSE PRACTITIONER

## 2023-08-24 PROCEDURE — 3288F FALL RISK ASSESSMENT DOCD: CPT | Mod: CPTII,S$GLB,, | Performed by: NURSE PRACTITIONER

## 2023-08-24 PROCEDURE — 3072F LOW RISK FOR RETINOPATHY: CPT | Mod: CPTII,S$GLB,, | Performed by: NURSE PRACTITIONER

## 2023-08-24 PROCEDURE — 3072F PR LOW RISK FOR RETINOPATHY: ICD-10-PCS | Mod: CPTII,S$GLB,, | Performed by: NURSE PRACTITIONER

## 2023-08-24 PROCEDURE — 99213 PR OFFICE/OUTPT VISIT, EST, LEVL III, 20-29 MIN: ICD-10-PCS | Mod: S$GLB,,, | Performed by: NURSE PRACTITIONER

## 2023-08-24 PROCEDURE — 1101F PT FALLS ASSESS-DOCD LE1/YR: CPT | Mod: CPTII,S$GLB,, | Performed by: NURSE PRACTITIONER

## 2023-08-24 PROCEDURE — 3075F PR MOST RECENT SYSTOLIC BLOOD PRESS GE 130-139MM HG: ICD-10-PCS | Mod: CPTII,S$GLB,, | Performed by: NURSE PRACTITIONER

## 2023-08-24 PROCEDURE — 3079F DIAST BP 80-89 MM HG: CPT | Mod: CPTII,S$GLB,, | Performed by: NURSE PRACTITIONER

## 2023-08-24 PROCEDURE — 3288F PR FALLS RISK ASSESSMENT DOCUMENTED: ICD-10-PCS | Mod: CPTII,S$GLB,, | Performed by: NURSE PRACTITIONER

## 2023-08-24 PROCEDURE — 80061 LIPID PANEL: CPT | Performed by: FAMILY MEDICINE

## 2023-08-24 PROCEDURE — 1159F MED LIST DOCD IN RCRD: CPT | Mod: CPTII,S$GLB,, | Performed by: NURSE PRACTITIONER

## 2023-08-24 PROCEDURE — 3008F PR BODY MASS INDEX (BMI) DOCUMENTED: ICD-10-PCS | Mod: CPTII,S$GLB,, | Performed by: NURSE PRACTITIONER

## 2023-08-24 PROCEDURE — 3008F BODY MASS INDEX DOCD: CPT | Mod: CPTII,S$GLB,, | Performed by: NURSE PRACTITIONER

## 2023-08-24 PROCEDURE — 84443 ASSAY THYROID STIM HORMONE: CPT | Performed by: FAMILY MEDICINE

## 2023-08-24 PROCEDURE — 4010F ACE/ARB THERAPY RXD/TAKEN: CPT | Mod: CPTII,S$GLB,, | Performed by: NURSE PRACTITIONER

## 2023-08-24 PROCEDURE — 1126F AMNT PAIN NOTED NONE PRSNT: CPT | Mod: CPTII,S$GLB,, | Performed by: NURSE PRACTITIONER

## 2023-08-24 RX ORDER — METFORMIN HYDROCHLORIDE 500 MG/1
500 TABLET, EXTENDED RELEASE ORAL 2 TIMES DAILY WITH MEALS
Qty: 180 TABLET | Refills: 3 | Status: SHIPPED | OUTPATIENT
Start: 2023-08-24 | End: 2024-03-20 | Stop reason: SDUPTHER

## 2023-08-24 RX ORDER — ATORVASTATIN CALCIUM 40 MG/1
40 TABLET, FILM COATED ORAL NIGHTLY
Qty: 90 TABLET | Refills: 3 | Status: SHIPPED | OUTPATIENT
Start: 2023-08-24 | End: 2023-10-03 | Stop reason: SDUPTHER

## 2023-08-24 RX ORDER — ALLOPURINOL 300 MG/1
300 TABLET ORAL DAILY
Qty: 90 TABLET | Refills: 3 | Status: SHIPPED | OUTPATIENT
Start: 2023-08-24

## 2023-08-24 RX ORDER — OLMESARTAN MEDOXOMIL 40 MG/1
40 TABLET ORAL DAILY
Qty: 90 TABLET | Refills: 3 | Status: SHIPPED | OUTPATIENT
Start: 2023-08-24

## 2023-08-24 NOTE — PROGRESS NOTES
"Subjective:       Patient ID: Jono Gonzalez is a 68 y.o. male.    Chief Complaint: Med refill.    Mr. Jono Gonzalez is a 67 year old male, established with me, presents to the clinic for medication refill. PCP is Jim Linn. Medical and surgical history in addition to problem list reviewed as listed below.    Presents for medication refill.      Past Medical History:   Diagnosis Date    Arthritis     Cataract     Diabetes mellitus     Gout attack     Hypertension         Past Surgical History:   Procedure Laterality Date    BACK SURGERY      lumbar fusion      X 2        Family History   Problem Relation Age of Onset    Diabetes Mother     Heart disease Mother     Thyroid disease Mother     Diabetes Sister     Heart disease Father     Diabetes Father     Heart disease Brother     Heart disease Sister     Diabetes Sister     Stroke Sister        Social History     Tobacco Use   Smoking Status Never   Smokeless Tobacco Never       Social History     Social History Narrative    Not on file       Review of patient's allergies indicates:  No Known Allergies     Review of Systems      Objective:      Vitals:    08/24/23 1536   BP: 135/84   BP Location: Left arm   Patient Position: Sitting   BP Method: Large (Automatic)   Pulse: 93   Weight: (!) 136.3 kg (300 lb 7.8 oz)   Height: 5' 7" (1.702 m)        Physical Exam  Constitutional:       Appearance: Normal appearance. He is obese.   Eyes:      Conjunctiva/sclera: Conjunctivae normal.   Pulmonary:      Effort: Pulmonary effort is normal. No respiratory distress.   Abdominal:      General: Bowel sounds are normal. There is no distension.      Tenderness: There is no abdominal tenderness. There is no guarding.   Musculoskeletal:         General: Normal range of motion.      Cervical back: Normal range of motion.   Skin:     Capillary Refill: Capillary refill takes 2 to 3 seconds.   Neurological:      Mental Status: He is alert and oriented to person, place, and time.      "    Assessment:       1. Encounter for medication refill    2. Diabetes mellitus without complication    3. Type 2 diabetes mellitus with diabetic polyneuropathy, unspecified whether long term insulin use    4. Mixed hyperlipidemia    5. Gouty arthropathy    6. Primary hypertension    7. Diabetic eye exam    8. Morbid obesity with BMI of 45.0-49.9, adult    9. Screen for colon cancer        Plan:       Encounter for medication refill    Diabetes mellitus without complication  Refilled-metFORMIN (GLUCOPHAGE-XR) 500 MG ER 24hr tablet; Take 1 tablet (500 mg total) by mouth 2 (two) times daily with meals.  Dispense: 180 tablet; Refill: 3    Type 2 diabetes mellitus with diabetic polyneuropathy, unspecified whether long term insulin use  -     Ambulatory referral/consult to Podiatry; Future; Expected date: 08/24/2023    Mixed hyperlipidemia  Refilled-atorvastatin (LIPITOR) 40 MG tablet; Take 1 tablet (40 mg total) by mouth every evening. For cholesterol  Dispense: 90 tablet; Refill: 3    Gouty arthropathy  Refilled-allopurinoL (ZYLOPRIM) 300 MG tablet; Take 1 tablet (300 mg total) by mouth once daily.  Dispense: 90 tablet; Refill: 3    Primary hypertension  Refilled-olmesartan (BENICAR) 40 MG tablet; Take 1 tablet (40 mg total) by mouth once daily. Blood pressure  Dispense: 90 tablet; Refill: 3    Diabetic eye exam  -     Ambulatory referral/consult to Ophthalmology; Future; Expected date: 08/24/2023    Morbid obesity with BMI of 45.0-49.9, adult  Recommend heart healthy/Mediterranean diet, exercise 3 times a week for 30 minute intervals, increase as tolerated.     Screen for colon cancer  -     Ambulatory referral/consult to Endo Procedure ; Future; Expected date: 08/24/2023    Health maintenance review/updated.      Medication List with Changes/Refills   Current Medications    ANASTROZOLE (ARIMIDEX) 1 MG TAB    Take 1 mg by mouth once daily.    ASPIRIN (ECOTRIN) 81 MG EC TABLET    Take 81 mg by mouth once  daily.    CLOTRIMAZOLE-BETAMETHASONE 1-0.05% (LOTRISONE) CREAM    Apply topically once daily. To feet and toenails    FUROSEMIDE (LASIX) 40 MG TABLET    Take 1 tablet (40 mg total) by mouth 2 (two) times a day.    HYDROCODONE-ACETAMINOPHEN (NORCO)  MG PER TABLET    Take 1 tablet by mouth.    HYDROCODONE-ACETAMINOPHEN 5-325MG (NORCO) 5-325 MG PER TABLET    TK 1 T PO  Q 6 TO 8 H    IBUPROFEN (ADVIL,MOTRIN) 600 MG TABLET    TAKE 1 TABLET(600 MG) BY MOUTH EVERY 6 HOURS WITH FOOD AS NEEDED FOR PAIN OR INFLAMMATION    NAPROXEN (NAPROSYN) 500 MG TABLET    Take 500 mg by mouth 2 (two) times daily as needed.    PREGABALIN (LYRICA) 100 MG CAPSULE    TAKE 1 CAPSULE (100 MG TOTAL) BY MOUTH THREE TIMES DAILY.    SEMAGLUTIDE (OZEMPIC) 1 MG/DOSE (4 MG/3 ML)    Inject 1 mg into the skin every 7 days.    SILDENAFIL (VIAGRA) 100 MG TABLET    Take 1 tablet (100 mg total) by mouth daily as needed for Erectile Dysfunction (take on an empty stomach 1 hour prior to sex).    TAMSULOSIN (FLOMAX) 0.4 MG CAP    Take 1 capsule (0.4 mg total) by mouth every morning.   Changed and/or Refilled Medications    Modified Medication Previous Medication    ALLOPURINOL (ZYLOPRIM) 300 MG TABLET allopurinoL (ZYLOPRIM) 300 MG tablet       Take 1 tablet (300 mg total) by mouth once daily.    TAKE 1 TABLET(300 MG) BY MOUTH EVERY DAY    ATORVASTATIN (LIPITOR) 40 MG TABLET atorvastatin (LIPITOR) 40 MG tablet       Take 1 tablet (40 mg total) by mouth every evening. For cholesterol    Take 1 tablet (40 mg total) by mouth every evening. For cholesterol    METFORMIN (GLUCOPHAGE-XR) 500 MG ER 24HR TABLET metFORMIN (GLUCOPHAGE-XR) 500 MG ER 24hr tablet       Take 1 tablet (500 mg total) by mouth 2 (two) times daily with meals.    Take 1 tablet (500 mg total) by mouth 2 (two) times daily with meals.    OLMESARTAN (BENICAR) 40 MG TABLET olmesartan (BENICAR) 40 MG tablet       Take 1 tablet (40 mg total) by mouth once daily. Blood pressure    Take 1 tablet (40  mg total) by mouth once daily. Blood pressure            Follow up if symptoms worsen or fail to improve.    JOON Wynn, MSN, FNP-C

## 2023-08-25 LAB — TSH SERPL DL<=0.005 MIU/L-ACNC: 1.04 UIU/ML (ref 0.4–4)

## 2023-08-26 ENCOUNTER — TELEPHONE (OUTPATIENT)
Dept: ENDOSCOPY | Facility: HOSPITAL | Age: 68
End: 2023-08-26
Payer: COMMERCIAL

## 2023-08-28 ENCOUNTER — PATIENT MESSAGE (OUTPATIENT)
Dept: PRIMARY CARE CLINIC | Facility: CLINIC | Age: 68
End: 2023-08-28
Payer: COMMERCIAL

## 2023-08-29 ENCOUNTER — TELEPHONE (OUTPATIENT)
Dept: ENDOSCOPY | Facility: HOSPITAL | Age: 68
End: 2023-08-29
Payer: COMMERCIAL

## 2023-08-29 VITALS — HEIGHT: 67 IN | BODY MASS INDEX: 47.09 KG/M2 | WEIGHT: 300 LBS

## 2023-08-29 DIAGNOSIS — Z12.11 COLON CANCER SCREENING: Primary | ICD-10-CM

## 2023-08-29 RX ORDER — SODIUM, POTASSIUM,MAG SULFATES 17.5-3.13G
1 SOLUTION, RECONSTITUTED, ORAL ORAL DAILY
Qty: 1 KIT | Refills: 0 | Status: SHIPPED | OUTPATIENT
Start: 2023-08-29 | End: 2023-08-31

## 2023-08-29 NOTE — TELEPHONE ENCOUNTER
Spoke to pt to schedule procedure(s) Colonoscopy       Physician to perform procedure(s) Dr. ZENY Orosco  Date of Procedure (s) 11/20/23  Arrival Time 7:30 AM  Time of Procedure(s) 8:30 AM   Location of Procedure(s) [place] [address]  Type of Rx Prep sent to patient: Suprep  Instructions provided to patient via [facility]    Patient was informed on the following information and verbalized understanding. Screening questionnaire reviewed with patient and complete. If procedure requires anesthesia, a responsible adult needs to be present to accompany the patient home, patient cannot drive after receiving anesthesia. Appointment details are tentative, especially check-in time. Patient will receive a prep-op call 4 days prior to confirm check-in time for procedure. If applicable the patient should contact their pharmacy to verify Rx for procedure prep is ready for pick-up. Patient was advised to call the scheduling department at [phone] if pharmacy states no Rx is available. Patient was advised to call the endoscopy scheduling department if any questions or concerns arise.      [facility] Scheduling Department

## 2023-08-29 NOTE — PLAN OF CARE
Spoke to pt to schedule procedure(s) Colonoscopy       Physician to perform procedure(s) Dr. ZENY Orosco  Date of Procedure (s) 11/20/23  Arrival Time 7:30 AM  Time of Procedure(s) 8:30 AM   Location of Procedure(s) [place] [address]  Type of Rx Prep sent to patient: Suprep  Instructions provided to patient via [facility]    Patient was informed on the following information and verbalized understanding. Screening questionnaire reviewed with patient and complete. If procedure requires anesthesia, a responsible adult needs to be present to accompany the patient home, patient cannot drive after receiving anesthesia. Appointment details are tentative, especially check-in time. Patient will receive a prep-op call 4 days prior to confirm check-in time for procedure. If applicable the patient should contact their pharmacy to verify Rx for procedure prep is ready for pick-up. Patient was advised to call the scheduling department at [phone] if pharmacy states no Rx is available. Patient was advised to call the endoscopy scheduling department if any questions or concerns arise.      [facility] Scheduling Department

## 2023-08-31 ENCOUNTER — OFFICE VISIT (OUTPATIENT)
Dept: PODIATRY | Facility: CLINIC | Age: 68
End: 2023-08-31
Payer: COMMERCIAL

## 2023-08-31 VITALS
WEIGHT: 304.44 LBS | HEART RATE: 81 BPM | DIASTOLIC BLOOD PRESSURE: 76 MMHG | BODY MASS INDEX: 47.78 KG/M2 | HEIGHT: 67 IN | SYSTOLIC BLOOD PRESSURE: 144 MMHG

## 2023-08-31 DIAGNOSIS — I89.0 LYMPHEDEMA OF BOTH LOWER EXTREMITIES: Primary | ICD-10-CM

## 2023-08-31 DIAGNOSIS — S91.302A OPEN WOUND OF LEFT FOOT, INITIAL ENCOUNTER: ICD-10-CM

## 2023-08-31 DIAGNOSIS — E11.42 TYPE 2 DIABETES MELLITUS WITH DIABETIC POLYNEUROPATHY, UNSPECIFIED WHETHER LONG TERM INSULIN USE: ICD-10-CM

## 2023-08-31 DIAGNOSIS — E11.9 ENCOUNTER FOR DIABETIC FOOT EXAM: ICD-10-CM

## 2023-08-31 DIAGNOSIS — B35.3 TINEA PEDIS OF BOTH FEET: ICD-10-CM

## 2023-08-31 PROCEDURE — 1159F PR MEDICATION LIST DOCUMENTED IN MEDICAL RECORD: ICD-10-PCS | Mod: CPTII,S$GLB,, | Performed by: PODIATRIST

## 2023-08-31 PROCEDURE — 3288F FALL RISK ASSESSMENT DOCD: CPT | Mod: CPTII,S$GLB,, | Performed by: PODIATRIST

## 2023-08-31 PROCEDURE — 99214 PR OFFICE/OUTPT VISIT, EST, LEVL IV, 30-39 MIN: ICD-10-PCS | Mod: 25,S$GLB,, | Performed by: PODIATRIST

## 2023-08-31 PROCEDURE — 1126F AMNT PAIN NOTED NONE PRSNT: CPT | Mod: CPTII,S$GLB,, | Performed by: PODIATRIST

## 2023-08-31 PROCEDURE — 11721 PR DEBRIDEMENT OF NAILS, 6 OR MORE: ICD-10-PCS | Mod: S$GLB,,, | Performed by: PODIATRIST

## 2023-08-31 PROCEDURE — 1159F MED LIST DOCD IN RCRD: CPT | Mod: CPTII,S$GLB,, | Performed by: PODIATRIST

## 2023-08-31 PROCEDURE — 3077F PR MOST RECENT SYSTOLIC BLOOD PRESSURE >= 140 MM HG: ICD-10-PCS | Mod: CPTII,S$GLB,, | Performed by: PODIATRIST

## 2023-08-31 PROCEDURE — 1160F PR REVIEW ALL MEDS BY PRESCRIBER/CLIN PHARMACIST DOCUMENTED: ICD-10-PCS | Mod: CPTII,S$GLB,, | Performed by: PODIATRIST

## 2023-08-31 PROCEDURE — 3077F SYST BP >= 140 MM HG: CPT | Mod: CPTII,S$GLB,, | Performed by: PODIATRIST

## 2023-08-31 PROCEDURE — 3008F PR BODY MASS INDEX (BMI) DOCUMENTED: ICD-10-PCS | Mod: CPTII,S$GLB,, | Performed by: PODIATRIST

## 2023-08-31 PROCEDURE — 4010F ACE/ARB THERAPY RXD/TAKEN: CPT | Mod: CPTII,S$GLB,, | Performed by: PODIATRIST

## 2023-08-31 PROCEDURE — 99999 PR PBB SHADOW E&M-EST. PATIENT-LVL IV: CPT | Mod: PBBFAC,,, | Performed by: PODIATRIST

## 2023-08-31 PROCEDURE — 4010F PR ACE/ARB THEARPY RXD/TAKEN: ICD-10-PCS | Mod: CPTII,S$GLB,, | Performed by: PODIATRIST

## 2023-08-31 PROCEDURE — 3078F PR MOST RECENT DIASTOLIC BLOOD PRESSURE < 80 MM HG: ICD-10-PCS | Mod: CPTII,S$GLB,, | Performed by: PODIATRIST

## 2023-08-31 PROCEDURE — 99999 PR PBB SHADOW E&M-EST. PATIENT-LVL IV: ICD-10-PCS | Mod: PBBFAC,,, | Performed by: PODIATRIST

## 2023-08-31 PROCEDURE — 1101F PT FALLS ASSESS-DOCD LE1/YR: CPT | Mod: CPTII,S$GLB,, | Performed by: PODIATRIST

## 2023-08-31 PROCEDURE — 3078F DIAST BP <80 MM HG: CPT | Mod: CPTII,S$GLB,, | Performed by: PODIATRIST

## 2023-08-31 PROCEDURE — 1160F RVW MEDS BY RX/DR IN RCRD: CPT | Mod: CPTII,S$GLB,, | Performed by: PODIATRIST

## 2023-08-31 PROCEDURE — 1126F PR PAIN SEVERITY QUANTIFIED, NO PAIN PRESENT: ICD-10-PCS | Mod: CPTII,S$GLB,, | Performed by: PODIATRIST

## 2023-08-31 PROCEDURE — 3072F LOW RISK FOR RETINOPATHY: CPT | Mod: CPTII,S$GLB,, | Performed by: PODIATRIST

## 2023-08-31 PROCEDURE — 3288F PR FALLS RISK ASSESSMENT DOCUMENTED: ICD-10-PCS | Mod: CPTII,S$GLB,, | Performed by: PODIATRIST

## 2023-08-31 PROCEDURE — 1101F PR PT FALLS ASSESS DOC 0-1 FALLS W/OUT INJ PAST YR: ICD-10-PCS | Mod: CPTII,S$GLB,, | Performed by: PODIATRIST

## 2023-08-31 PROCEDURE — 3072F PR LOW RISK FOR RETINOPATHY: ICD-10-PCS | Mod: CPTII,S$GLB,, | Performed by: PODIATRIST

## 2023-08-31 PROCEDURE — 99214 OFFICE O/P EST MOD 30 MIN: CPT | Mod: 25,S$GLB,, | Performed by: PODIATRIST

## 2023-08-31 PROCEDURE — 3008F BODY MASS INDEX DOCD: CPT | Mod: CPTII,S$GLB,, | Performed by: PODIATRIST

## 2023-08-31 PROCEDURE — 11721 DEBRIDE NAIL 6 OR MORE: CPT | Mod: S$GLB,,, | Performed by: PODIATRIST

## 2023-08-31 RX ORDER — KETOCONAZOLE 20 MG/G
CREAM TOPICAL DAILY
Qty: 60 G | Refills: 1 | Status: SHIPPED | OUTPATIENT
Start: 2023-08-31

## 2023-08-31 NOTE — PROGRESS NOTES
Subjective:      Patient ID: Jono Gonzalez is a 68 y.o. male.    Chief Complaint:   Diabetic Foot Exam (8/24/2023 - Brenda Zabala NP) and Routine Foot Care    Jono is a 68 y.o. male who presents to the clinic for evaluation and treatment of high risk feet. Jono has a past medical history of Arthritis, Cataract, Diabetes mellitus, Gout attack, and Hypertension. T    Patient new to me    Concern with dry skin on feet and nails catching on compression stocks  History of gout  Patient relates that he was advised not to go anymore and get a pedicure [facility]  Denies any numbness tingling his feet  Denies any foot pain  Denies any smoking  Have a lot of swelling in the legs  Wears good shoes  Using lotion such as Jergens    PCP: Jim Linn MD    Date Last Seen by PCP: 8/24/23    Current shoe gear:  Affected Foot: Casual shoes     Unaffected Foot: Casual shoes    Hemoglobin A1C   Date Value Ref Range Status   09/20/2022 5.8 (H) 4.0 - 5.6 % Final     Comment:     ADA Screening Guidelines:  5.7-6.4%  Consistent with prediabetes  >or=6.5%  Consistent with diabetes    High levels of fetal hemoglobin interfere with the HbA1C  assay. Heterozygous hemoglobin variants (HbS, HgC, etc)do  not significantly interfere with this assay.   However, presence of multiple variants may affect accuracy.     07/22/2022 6.2 (H) 4.0 - 5.6 % Final     Comment:     ADA Screening Guidelines:  5.7-6.4%  Consistent with prediabetes  >or=6.5%  Consistent with diabetes    High levels of fetal hemoglobin interfere with the HbA1C  assay. Heterozygous hemoglobin variants (HbS, HgC, etc)do  not significantly interfere with this assay.   However, presence of multiple variants may affect accuracy.     04/22/2022 7.1 (H) 4.0 - 5.6 % Final     Comment:     ADA Screening Guidelines:  5.7-6.4%  Consistent with prediabetes  >or=6.5%  Consistent with diabetes    High levels of fetal hemoglobin interfere with the HbA1C  assay. Heterozygous hemoglobin  variants (HbS, HgC, etc)do  not significantly interfere with this assay.   However, presence of multiple variants may affect accuracy.          Past Medical History:   Diagnosis Date    Arthritis     Cataract     Diabetes mellitus     Gout attack     Hypertension      Past Surgical History:   Procedure Laterality Date    BACK SURGERY      lumbar fusion      X 2     Current Outpatient Medications on File Prior to Visit   Medication Sig Dispense Refill    allopurinoL (ZYLOPRIM) 300 MG tablet Take 1 tablet (300 mg total) by mouth once daily. 90 tablet 3    anastrozole (ARIMIDEX) 1 mg Tab Take 1 mg by mouth once daily.      aspirin (ECOTRIN) 81 MG EC tablet Take 81 mg by mouth once daily.      atorvastatin (LIPITOR) 40 MG tablet Take 1 tablet (40 mg total) by mouth every evening. For cholesterol 90 tablet 3    clotrimazole-betamethasone 1-0.05% (LOTRISONE) cream Apply topically once daily. To feet and toenails 45 g 1    furosemide (LASIX) 40 MG tablet Take 1 tablet (40 mg total) by mouth 2 (two) times a day. 90 tablet 3    HYDROcodone-acetaminophen (NORCO)  mg per tablet Take 1 tablet by mouth.      hydrocodone-acetaminophen 5-325mg (NORCO) 5-325 mg per tablet TK 1 T PO  Q 6 TO 8 H  0    ibuprofen (ADVIL,MOTRIN) 600 MG tablet TAKE 1 TABLET(600 MG) BY MOUTH EVERY 6 HOURS WITH FOOD AS NEEDED FOR PAIN OR INFLAMMATION 90 tablet 2    metFORMIN (GLUCOPHAGE-XR) 500 MG ER 24hr tablet Take 1 tablet (500 mg total) by mouth 2 (two) times daily with meals. 180 tablet 3    naproxen (NAPROSYN) 500 MG tablet Take 500 mg by mouth 2 (two) times daily as needed.      olmesartan (BENICAR) 40 MG tablet Take 1 tablet (40 mg total) by mouth once daily. Blood pressure 90 tablet 3    pregabalin (LYRICA) 100 MG capsule TAKE 1 CAPSULE (100 MG TOTAL) BY MOUTH THREE TIMES DAILY. 90 capsule 1    semaglutide (OZEMPIC) 1 mg/dose (4 mg/3 mL) Inject 1 mg into the skin every 7 days. 4 pen 2    sildenafiL (VIAGRA) 100 MG tablet Take 1 tablet (100  "mg total) by mouth daily as needed for Erectile Dysfunction (take on an empty stomach 1 hour prior to sex). 10 tablet 11    tamsulosin (FLOMAX) 0.4 mg Cap Take 1 capsule (0.4 mg total) by mouth every morning. 90 capsule 3     No current facility-administered medications on file prior to visit.     Review of patient's allergies indicates:  No Known Allergies    Review of Systems   Constitutional: Negative for chills, decreased appetite, fever, malaise/fatigue, night sweats, weight gain and weight loss.   Cardiovascular:  Positive for leg swelling. Negative for chest pain, claudication, dyspnea on exertion, palpitations and syncope.   Respiratory:  Negative for cough and shortness of breath.    Endocrine: Negative for cold intolerance and heat intolerance.   Hematologic/Lymphatic: Negative for bleeding problem. Does not bruise/bleed easily.   Skin:  Positive for dry skin and nail changes. Negative for color change, flushing, itching, poor wound healing, rash, skin cancer, suspicious lesions and unusual hair distribution.   Musculoskeletal:  Negative for arthritis, back pain, falls, gout, joint pain, joint swelling, muscle cramps, muscle weakness, myalgias, neck pain and stiffness.   Gastrointestinal:  Negative for diarrhea, nausea and vomiting.   Neurological:  Negative for dizziness, focal weakness, light-headedness, numbness, paresthesias, tremors, vertigo and weakness.   Psychiatric/Behavioral:  Negative for altered mental status and depression. The patient does not have insomnia.    Allergic/Immunologic: Negative.            Objective:       Vitals:    08/31/23 1533   BP: (!) 144/76   Pulse: 81   Weight: (!) 138.1 kg (304 lb 7.3 oz)   Height: 5' 7" (1.702 m)   PainSc: 0-No pain   PainLoc: Foot   (!) 138.1 kg (304 lb 7.3 oz)     Physical Exam  Vitals reviewed.   Constitutional:       General: He is not in acute distress.     Appearance: He is well-developed. He is not ill-appearing, toxic-appearing or diaphoretic. "      Comments: Proper supportive shoegear      Cardiovascular:      Pulses:           Dorsalis pedis pulses are 2+ on the right side and 2+ on the left side.        Posterior tibial pulses are 1+ on the right side and 1+ on the left side.      Comments: Decreased hair growth to lower shins with evidence of chronic venous stasis dz.       Musculoskeletal:         General: No swelling, tenderness or deformity.      Right lower le+ Edema present.      Left lower le+ Edema present.      Right ankle: Normal.      Right Achilles Tendon: Normal.      Left ankle: Normal.      Left Achilles Tendon: Normal.      Right foot: Decreased range of motion. No deformity.      Left foot: Decreased range of motion. No deformity.      Comments: No pop    Reducible extensor and flexor contractures at the MTPJ and PIPJ of toes 2-5, bilat.       Feet:      Right foot:      Protective Sensation: 10 sites tested.  10 sites sensed.      Skin integrity: Dry skin present. No ulcer, blister, skin breakdown, erythema, warmth or callus.      Toenail Condition: Right toenails are abnormally thick and long.      Left foot:      Protective Sensation: 10 sites tested.  10 sites sensed.      Skin integrity: Dry skin present. No ulcer, blister, skin breakdown, erythema, warmth or callus.      Toenail Condition: Left toenails are abnormally thick and long.      Comments: SWM intact     + IS macerations; superficial wounds to 4th IS b/l. No drainage or malodor     Plantar peeling  Skin:     General: Skin is warm and dry.      Capillary Refill: Capillary refill takes 2 to 3 seconds.      Coloration: Skin is not pale.      Findings: No erythema or rash.      Nails: There is no clubbing.   Neurological:      Mental Status: He is alert and oriented to person, place, and time.      Sensory: No sensory deficit.      Gait: Gait abnormal.   Psychiatric:         Attention and Perception: Attention normal.         Mood and Affect: Mood normal.          Speech: Speech normal.         Behavior: Behavior normal.         Thought Content: Thought content normal.         Cognition and Memory: Cognition normal.         Judgment: Judgment normal.               Assessment:       Encounter Diagnoses   Name Primary?    Type 2 diabetes mellitus with diabetic polyneuropathy, unspecified whether long term insulin use     Lymphedema of both lower extremities Yes    Encounter for diabetic foot exam     Tinea pedis of both feet     Open wound of left foot, initial encounter          Plan:       Jono was seen today for diabetic foot exam and routine foot care.    Diagnoses and all orders for this visit:    Lymphedema of both lower extremities    Type 2 diabetes mellitus with diabetic polyneuropathy, unspecified whether long term insulin use  -     Ambulatory referral/consult to Podiatry    Encounter for diabetic foot exam    Tinea pedis of both feet    Open wound of left foot, initial encounter    Other orders  -     ketoconazole (NIZORAL) 2 % cream; Apply topically once daily.      I counseled the patient on his conditions, their implications and medical management.      Chart review     Gentian violet to IS.     Shoe/hygeine review/tinea     D/w pt no pedicures    High risk foot    - Shoe inspection. Diabetic Foot Education. Patient reminded of the importance of good nutrition and blood sugar control to help prevent podiatric complications of diabetes. Patient instructed on proper foot hygeine. We discussed wearing proper shoe gear, daily foot inspections, never walking without protective shoe gear, never putting sharp instruments to feet, routine podiatric nail visits every 2-3 months.      - With patient's permission, nails were aggressively reduced and debrided x 10 to their soft tissue attachment mechanically   removing all offending nail and debris. Patient relates relief following the procedure. He will continue to monitor the areas daily, inspect his feet, wear  protective shoe gear when ambulatory, moisturizer to maintain skin integrity and follow in this office in approximately 2-3 months, sooner p.r.n.    F/u 2-3 weeks to check interspaces  Consider oral lamisil        Follow up in about 3 weeks (around 9/21/2023).

## 2023-09-11 ENCOUNTER — TELEPHONE (OUTPATIENT)
Dept: UROLOGY | Facility: CLINIC | Age: 68
End: 2023-09-11
Payer: COMMERCIAL

## 2023-09-11 NOTE — TELEPHONE ENCOUNTER
----- Message from Maria Eugenia Mccoy LPN sent at 9/8/2023  4:44 PM CDT -----  Contact: [phone]    ----- Message -----  From: Albert Grande  Sent: 9/8/2023   4:15 PM CDT  To: Jackie GUERRA Staff    Type:  RX Refill Request        Who Called:pt        Refill or New Rx:refill         RX Name and Strength:tamsulosin (FLOMAX) 0.4 mg Cap        Preferred Pharmacy with phone number:  [facility]  [address] [facility] [address] [address]  Phone: [phone] Fax: [phone]            Best Call Back Number:[phone]          Additional [employer]

## 2023-09-11 NOTE — TELEPHONE ENCOUNTER
Spoke to pt instructed that he has refills available for him to call local pharmacy and ask for the refill.  MARI flynn lpn

## 2023-09-12 ENCOUNTER — OFFICE VISIT (OUTPATIENT)
Dept: UROLOGY | Facility: CLINIC | Age: 68
End: 2023-09-12
Payer: COMMERCIAL

## 2023-09-12 VITALS
HEART RATE: 90 BPM | DIASTOLIC BLOOD PRESSURE: 90 MMHG | BODY MASS INDEX: 46.3 KG/M2 | WEIGHT: 295 LBS | HEIGHT: 67 IN | SYSTOLIC BLOOD PRESSURE: 140 MMHG

## 2023-09-12 DIAGNOSIS — N52.9 ERECTILE DYSFUNCTION, UNSPECIFIED ERECTILE DYSFUNCTION TYPE: ICD-10-CM

## 2023-09-12 DIAGNOSIS — N13.8 BPH WITH OBSTRUCTION/LOWER URINARY TRACT SYMPTOMS: Primary | ICD-10-CM

## 2023-09-12 DIAGNOSIS — R35.0 FREQUENCY OF URINATION: ICD-10-CM

## 2023-09-12 DIAGNOSIS — N40.1 BPH WITH OBSTRUCTION/LOWER URINARY TRACT SYMPTOMS: Primary | ICD-10-CM

## 2023-09-12 LAB
BILIRUB SERPL-MCNC: NORMAL MG/DL
BLOOD URINE, POC: NORMAL
CLARITY, POC UA: CLEAR
COLOR, POC UA: YELLOW
GLUCOSE UR QL STRIP: NORMAL
KETONES UR QL STRIP: NORMAL
LEUKOCYTE ESTERASE URINE, POC: NORMAL
NITRITE, POC UA: NORMAL
PH, POC UA: 5
POC RESIDUAL URINE VOLUME: 0 ML (ref 0–100)
PROTEIN, POC: NORMAL
SPECIFIC GRAVITY, POC UA: 1
UROBILINOGEN, POC UA: NORMAL

## 2023-09-12 PROCEDURE — 3008F PR BODY MASS INDEX (BMI) DOCUMENTED: ICD-10-PCS | Mod: CPTII,S$GLB,,

## 2023-09-12 PROCEDURE — 4010F PR ACE/ARB THEARPY RXD/TAKEN: ICD-10-PCS | Mod: CPTII,S$GLB,,

## 2023-09-12 PROCEDURE — 1160F PR REVIEW ALL MEDS BY PRESCRIBER/CLIN PHARMACIST DOCUMENTED: ICD-10-PCS | Mod: CPTII,S$GLB,,

## 2023-09-12 PROCEDURE — 4010F ACE/ARB THERAPY RXD/TAKEN: CPT | Mod: CPTII,S$GLB,,

## 2023-09-12 PROCEDURE — 3077F SYST BP >= 140 MM HG: CPT | Mod: CPTII,S$GLB,,

## 2023-09-12 PROCEDURE — 3080F PR MOST RECENT DIASTOLIC BLOOD PRESSURE >= 90 MM HG: ICD-10-PCS | Mod: CPTII,S$GLB,,

## 2023-09-12 PROCEDURE — 51798 POCT BLADDER SCAN: ICD-10-PCS | Mod: S$GLB,,,

## 2023-09-12 PROCEDURE — 1101F PR PT FALLS ASSESS DOC 0-1 FALLS W/OUT INJ PAST YR: ICD-10-PCS | Mod: CPTII,S$GLB,,

## 2023-09-12 PROCEDURE — 81002 URINALYSIS NONAUTO W/O SCOPE: CPT | Mod: S$GLB,,,

## 2023-09-12 PROCEDURE — 81002 POCT URINE DIPSTICK WITHOUT MICROSCOPE: ICD-10-PCS | Mod: S$GLB,,,

## 2023-09-12 PROCEDURE — 3077F PR MOST RECENT SYSTOLIC BLOOD PRESSURE >= 140 MM HG: ICD-10-PCS | Mod: CPTII,S$GLB,,

## 2023-09-12 PROCEDURE — 99214 PR OFFICE/OUTPT VISIT, EST, LEVL IV, 30-39 MIN: ICD-10-PCS | Mod: S$GLB,,,

## 2023-09-12 PROCEDURE — 3288F PR FALLS RISK ASSESSMENT DOCUMENTED: ICD-10-PCS | Mod: CPTII,S$GLB,,

## 2023-09-12 PROCEDURE — 1101F PT FALLS ASSESS-DOCD LE1/YR: CPT | Mod: CPTII,S$GLB,,

## 2023-09-12 PROCEDURE — 3008F BODY MASS INDEX DOCD: CPT | Mod: CPTII,S$GLB,,

## 2023-09-12 PROCEDURE — 51798 US URINE CAPACITY MEASURE: CPT | Mod: S$GLB,,,

## 2023-09-12 PROCEDURE — 3080F DIAST BP >= 90 MM HG: CPT | Mod: CPTII,S$GLB,,

## 2023-09-12 PROCEDURE — 3072F LOW RISK FOR RETINOPATHY: CPT | Mod: CPTII,S$GLB,,

## 2023-09-12 PROCEDURE — 1126F AMNT PAIN NOTED NONE PRSNT: CPT | Mod: CPTII,S$GLB,,

## 2023-09-12 PROCEDURE — 3288F FALL RISK ASSESSMENT DOCD: CPT | Mod: CPTII,S$GLB,,

## 2023-09-12 PROCEDURE — 1126F PR PAIN SEVERITY QUANTIFIED, NO PAIN PRESENT: ICD-10-PCS | Mod: CPTII,S$GLB,,

## 2023-09-12 PROCEDURE — 99214 OFFICE O/P EST MOD 30 MIN: CPT | Mod: S$GLB,,,

## 2023-09-12 PROCEDURE — 99999 PR PBB SHADOW E&M-EST. PATIENT-LVL IV: CPT | Mod: PBBFAC,,,

## 2023-09-12 PROCEDURE — 99999 PR PBB SHADOW E&M-EST. PATIENT-LVL IV: ICD-10-PCS | Mod: PBBFAC,,,

## 2023-09-12 PROCEDURE — 1159F PR MEDICATION LIST DOCUMENTED IN MEDICAL RECORD: ICD-10-PCS | Mod: CPTII,S$GLB,,

## 2023-09-12 PROCEDURE — 1159F MED LIST DOCD IN RCRD: CPT | Mod: CPTII,S$GLB,,

## 2023-09-12 PROCEDURE — 1160F RVW MEDS BY RX/DR IN RCRD: CPT | Mod: CPTII,S$GLB,,

## 2023-09-12 PROCEDURE — 3072F PR LOW RISK FOR RETINOPATHY: ICD-10-PCS | Mod: CPTII,S$GLB,,

## 2023-09-12 RX ORDER — TAMSULOSIN HYDROCHLORIDE 0.4 MG/1
0.4 CAPSULE ORAL EVERY MORNING
Qty: 90 CAPSULE | Refills: 3 | Status: SHIPPED | OUTPATIENT
Start: 2023-09-12 | End: 2024-09-11

## 2023-09-12 RX ORDER — SILDENAFIL 100 MG/1
100 TABLET, FILM COATED ORAL DAILY PRN
Qty: 10 TABLET | Refills: 11 | Status: SHIPPED | OUTPATIENT
Start: 2023-09-12 | End: 2024-09-11

## 2023-09-12 NOTE — PROGRESS NOTES
CHIEF COMPLAINT:  BPH      HISTORY OF PRESENTING ILLINESS:  Jono Gonzalez is a 67 y.o. male est to [facility]. He established care with our dept 12/30/22. He was under the care of Dr. Romain Lopez who has now retired. He has BPH with LUTS, ED, arthritis, cataract, DM, gout and HTN. He has been on Flomax and Proscar since 2017 due to an episode of urinary retention following back surgery. He developed a breast lump and tenderness after beginning Proscar - surgically removed in 2017. He is experiencing breast tenderness again. His wife states he was supposed to have 6 month mammograms performed.  do not see a mammogram in his chart. States he has had an elevated PSA in the past - not elevated enough to perform MRI prostate or prostate biopsy. He is unsure of his last PSA result - no PSA results in chart. He takes 40 mg Lasix every night.      I prescribed a trial of sildenafil 100 mg PRN in December for ED. He states the medication is working well.   I discontinued finasteride in December due to breast tenderness and hx of surgical removal of breast lump in 2017.     No family history of prostate, bladder or kidney cancer. No personal or family hx of kidney stones.       REVIEW OF SYSTEMS:  Review of Systems   Constitutional:  Negative for chills and fever.   HENT:  Negative for congestion and sore throat.    Respiratory:  Negative for cough and shortness of breath.    Cardiovascular:  Negative for chest pain and palpitations.   Gastrointestinal:  Negative for nausea and vomiting.   Genitourinary:  Negative for dysuria, flank pain, frequency, hematuria and urgency.   Neurological:  Negative for dizziness and headaches.         PATIENT HISTORY:    Past Medical History:   Diagnosis Date    Arthritis     Cataract     Diabetes mellitus     Gout attack     Hypertension        Past Surgical History:   Procedure Laterality Date    BACK SURGERY      lumbar fusion      X 2       Family History   Problem Relation Age of  Onset    Diabetes Mother     Heart disease Mother     Thyroid disease Mother     Diabetes Sister     Heart disease Father     Diabetes Father     Heart disease Brother     Heart disease Sister     Diabetes Sister     Stroke Sister        Social History     Socioeconomic History    Marital status:    Tobacco Use    Smoking status: Never    Smokeless tobacco: Never   Substance and Sexual Activity    Alcohol use: Yes     Comment: occ    Drug use: No     Social Determinants of Health     Financial Resource Strain: Low Risk  (8/10/2023)    Overall Financial Resource Strain (CARDIA)     Difficulty of Paying Living Expenses: Not very hard   Food Insecurity: No Food Insecurity (8/10/2023)    Hunger Vital Sign     Worried About Running Out of Food in the Last Year: Never true     Ran Out of Food in the Last Year: Never true   Transportation Needs: No Transportation Needs (8/10/2023)    PRAPARE - Transportation     Lack of Transportation (Medical): No     Lack of Transportation (Non-Medical): No   Physical Activity: Sufficiently Active (8/10/2023)    Exercise Vital Sign     Days of Exercise per Week: 7 days     Minutes of Exercise per Session: 110 min   Stress: No Stress Concern Present (8/10/2023)    [place] [employer] - Occupational Stress Questionnaire     Feeling of Stress : Not at all   Social Connections: Moderately Isolated (8/10/2023)    Social Connection and Isolation Panel [NHANES]     Frequency of Communication with Friends and Family: More than three times a week     Frequency of Social Gatherings with Friends and Family: More than three times a week     Attends Faith Services: Never     Active Member of Clubs or Organizations: No     Attends Club or Organization Meetings: Never     Marital Status:    Housing Stability: Unknown (8/10/2023)    Housing Stability Vital Sign     Unable to Pay for Housing in the Last Year: No     Unstable Housing in the Last Year: No        Allergies:  Patient has no known allergies.    Medications:    Current Outpatient Medications:     allopurinoL (ZYLOPRIM) 300 MG tablet, Take 1 tablet (300 mg total) by mouth once daily., Disp: 90 tablet, Rfl: 3    anastrozole (ARIMIDEX) 1 mg Tab, Take 1 mg by mouth once daily., Disp: , Rfl:     aspirin (ECOTRIN) 81 MG EC tablet, Take 81 mg by mouth once daily., Disp: , Rfl:     atorvastatin (LIPITOR) 40 MG tablet, Take 1 tablet (40 mg total) by mouth every evening. For cholesterol, Disp: 90 tablet, Rfl: 3    clotrimazole-betamethasone 1-0.05% (LOTRISONE) cream, Apply topically once daily. To feet and toenails, Disp: 45 g, Rfl: 1    furosemide (LASIX) 40 MG tablet, Take 1 tablet (40 mg total) by mouth 2 (two) times a day., Disp: 90 tablet, Rfl: 3    HYDROcodone-acetaminophen (NORCO)  mg per tablet, Take 1 tablet by mouth., Disp: , Rfl:     hydrocodone-acetaminophen 5-325mg (NORCO) 5-325 mg per tablet, TK 1 T PO  Q 6 TO 8 H, Disp: , Rfl: 0    ibuprofen (ADVIL,MOTRIN) 600 MG tablet, TAKE 1 TABLET(600 MG) BY MOUTH EVERY 6 HOURS WITH FOOD AS NEEDED FOR PAIN OR INFLAMMATION, Disp: 90 tablet, Rfl: 2    ketoconazole (NIZORAL) 2 % cream, Apply topically once daily., Disp: 60 g, Rfl: 1    metFORMIN (GLUCOPHAGE-XR) 500 MG ER 24hr tablet, Take 1 tablet (500 mg total) by mouth 2 (two) times daily with meals., Disp: 180 tablet, Rfl: 3    naproxen (NAPROSYN) 500 MG tablet, Take 500 mg by mouth 2 (two) times daily as needed., Disp: , Rfl:     olmesartan (BENICAR) 40 MG tablet, Take 1 tablet (40 mg total) by mouth once daily. Blood pressure, Disp: 90 tablet, Rfl: 3    pregabalin (LYRICA) 100 MG capsule, TAKE 1 CAPSULE (100 MG TOTAL) BY MOUTH THREE TIMES DAILY., Disp: 90 capsule, Rfl: 1    semaglutide (OZEMPIC) 1 mg/dose (4 mg/3 mL), Inject 1 mg into the skin every 7 days., Disp: 4 pen, Rfl: 2    sildenafiL (VIAGRA) 100 MG tablet, Take 1 tablet (100 mg total) by mouth daily as needed for Erectile Dysfunction (take on an  empty stomach 1 hour prior to sex)., Disp: 10 tablet, Rfl: 11    tamsulosin (FLOMAX) 0.4 mg Cap, Take 1 capsule (0.4 mg total) by mouth every morning., Disp: 90 capsule, Rfl: 3      PHYSICAL EXAMINATION:  Physical Exam  Constitutional:       Appearance: Normal appearance.   HENT:      Head: Normocephalic and atraumatic.      Right Ear: External ear normal.      Left Ear: External ear normal.   Pulmonary:      Effort: Pulmonary effort is normal. No respiratory distress.   Genitourinary:     Prostate: Enlarged. Not tender and no nodules present.      Rectum: Normal.   Skin:     General: Skin is warm and dry.   Neurological:      General: No focal deficit present.      Mental Status: He is alert and oriented to person, place, and time.   Psychiatric:         Mood and Affect: Mood normal.         Behavior: Behavior normal.           LABS:  UA WNL  0 ml PVR      Lab Results   Component Value Date    PSADIAG 0.44 12/30/2022       Lab Results   Component Value Date    CREATININE 1.0 08/24/2023    EGFRNORACEVR >60.0 08/24/2023           IMPRESSION:  Encounter Diagnoses   Name Primary?    BPH with obstruction/lower urinary tract symptoms Yes    Erectile dysfunction, unspecified erectile dysfunction type     Frequency of urination          Assessment:       1. BPH with obstruction/lower urinary tract symptoms    2. Erectile dysfunction, unspecified erectile dysfunction type    3. Frequency of urination        Plan:   - Rx Flomax renewed   - Rx Sildenafil renewed  - PSA in January    [facility] 1 year or sooner PRN    I spent 30 minutes with the patient of which more than half was spent in direct consultation with the patient in regards to our treatment and plan.  We addressed the office findings and recent labs.   Education and recommendations of today's plan of care including home remedies and needed follow up with PCP.   We discussed the chief complaint; reviewed the LUTS and the possible contributory factors.   Reassurance no  infection.  Recommended lifestyle modifications with a proper, healthy diet, good hydration but during the day. Reducing bladder irritants.   Benefits of regular exercise.

## 2023-09-14 ENCOUNTER — TELEPHONE (OUTPATIENT)
Dept: PODIATRY | Facility: CLINIC | Age: 68
End: 2023-09-14
Payer: COMMERCIAL

## 2023-09-19 ENCOUNTER — TELEPHONE (OUTPATIENT)
Dept: ADMINISTRATIVE | Facility: CLINIC | Age: 68
End: 2023-09-19
Payer: COMMERCIAL

## 2023-09-20 ENCOUNTER — PATIENT MESSAGE (OUTPATIENT)
Dept: PRIMARY CARE CLINIC | Facility: CLINIC | Age: 68
End: 2023-09-20

## 2023-09-20 ENCOUNTER — CLINICAL SUPPORT (OUTPATIENT)
Dept: PRIMARY CARE CLINIC | Facility: CLINIC | Age: 68
End: 2023-09-20
Payer: COMMERCIAL

## 2023-09-20 ENCOUNTER — OFFICE VISIT (OUTPATIENT)
Dept: PRIMARY CARE CLINIC | Facility: CLINIC | Age: 68
End: 2023-09-20
Payer: COMMERCIAL

## 2023-09-20 VITALS
HEART RATE: 82 BPM | DIASTOLIC BLOOD PRESSURE: 84 MMHG | SYSTOLIC BLOOD PRESSURE: 150 MMHG | BODY MASS INDEX: 46.85 KG/M2 | WEIGHT: 298.5 LBS | HEIGHT: 67 IN | OXYGEN SATURATION: 95 %

## 2023-09-20 DIAGNOSIS — N40.1 BPH WITH OBSTRUCTION/LOWER URINARY TRACT SYMPTOMS: ICD-10-CM

## 2023-09-20 DIAGNOSIS — Z00.00 ENCOUNTER FOR PREVENTIVE HEALTH EXAMINATION: Primary | ICD-10-CM

## 2023-09-20 DIAGNOSIS — H91.93 BILATERAL HEARING LOSS, UNSPECIFIED HEARING LOSS TYPE: ICD-10-CM

## 2023-09-20 DIAGNOSIS — R26.9 ABNORMALITY OF GAIT AND MOBILITY: ICD-10-CM

## 2023-09-20 DIAGNOSIS — I89.0 LYMPHEDEMA OF BOTH LOWER EXTREMITIES: ICD-10-CM

## 2023-09-20 DIAGNOSIS — Z74.09 OTHER REDUCED MOBILITY: ICD-10-CM

## 2023-09-20 DIAGNOSIS — I10 PRIMARY HYPERTENSION: ICD-10-CM

## 2023-09-20 DIAGNOSIS — G89.29 CHRONIC MIDLINE LOW BACK PAIN WITHOUT SCIATICA: ICD-10-CM

## 2023-09-20 DIAGNOSIS — E11.9 TYPE 2 DIABETES MELLITUS WITHOUT COMPLICATION: ICD-10-CM

## 2023-09-20 DIAGNOSIS — N13.8 BPH WITH OBSTRUCTION/LOWER URINARY TRACT SYMPTOMS: ICD-10-CM

## 2023-09-20 DIAGNOSIS — C61 PROSTATE CANCER: ICD-10-CM

## 2023-09-20 DIAGNOSIS — E29.1 MALE HYPOGONADISM: ICD-10-CM

## 2023-09-20 DIAGNOSIS — G57.93 NEUROPATHY INVOLVING BOTH LOWER EXTREMITIES: ICD-10-CM

## 2023-09-20 DIAGNOSIS — G47.33 OSA (OBSTRUCTIVE SLEEP APNEA): ICD-10-CM

## 2023-09-20 DIAGNOSIS — E11.9 TYPE 2 DIABETES MELLITUS WITHOUT COMPLICATION, WITHOUT LONG-TERM CURRENT USE OF INSULIN: ICD-10-CM

## 2023-09-20 DIAGNOSIS — I83.029 VENOUS STASIS ULCER OF LEFT LOWER EXTREMITY: ICD-10-CM

## 2023-09-20 DIAGNOSIS — L97.929 VENOUS STASIS ULCER OF LEFT LOWER EXTREMITY: ICD-10-CM

## 2023-09-20 DIAGNOSIS — F11.20 NARCOTIC DEPENDENCE: ICD-10-CM

## 2023-09-20 DIAGNOSIS — I87.2 CHRONIC VENOUS INSUFFICIENCY: ICD-10-CM

## 2023-09-20 DIAGNOSIS — L03.116 CELLULITIS OF LEFT LOWER EXTREMITY: ICD-10-CM

## 2023-09-20 DIAGNOSIS — H61.23 BILATERAL IMPACTED CERUMEN: ICD-10-CM

## 2023-09-20 DIAGNOSIS — M54.50 CHRONIC MIDLINE LOW BACK PAIN WITHOUT SCIATICA: ICD-10-CM

## 2023-09-20 DIAGNOSIS — E66.01 MORBID OBESITY WITH BMI OF 45.0-49.9, ADULT: ICD-10-CM

## 2023-09-20 DIAGNOSIS — I45.10 RBBB: ICD-10-CM

## 2023-09-20 DIAGNOSIS — M25.562 ACUTE PAIN OF LEFT KNEE: ICD-10-CM

## 2023-09-20 DIAGNOSIS — M10.9 GOUTY ARTHROPATHY: ICD-10-CM

## 2023-09-20 DIAGNOSIS — Z98.1 HISTORY OF LUMBAR SPINAL FUSION: ICD-10-CM

## 2023-09-20 DIAGNOSIS — E78.2 MIXED HYPERLIPIDEMIA: ICD-10-CM

## 2023-09-20 PROBLEM — T14.8XXA WOUND INFECTION: Status: RESOLVED | Noted: 2022-06-03 | Resolved: 2023-09-20

## 2023-09-20 PROBLEM — L08.9 WOUND INFECTION: Status: RESOLVED | Noted: 2022-06-03 | Resolved: 2023-09-20

## 2023-09-20 PROBLEM — M79.89 SWELLING OF LOWER LIMB: Status: RESOLVED | Noted: 2021-10-04 | Resolved: 2023-09-20

## 2023-09-20 PROBLEM — E66.9 OBESITY: Status: RESOLVED | Noted: 2018-10-11 | Resolved: 2023-09-20

## 2023-09-20 PROBLEM — R60.0 EDEMA OF BOTH LOWER EXTREMITIES: Status: RESOLVED | Noted: 2021-05-24 | Resolved: 2023-09-20

## 2023-09-20 LAB
ALBUMIN/CREAT UR: 9.9 UG/MG (ref 0–30)
CREAT UR-MCNC: 141 MG/DL (ref 23–375)
MICROALBUMIN UR DL<=1MG/L-MCNC: 14 UG/ML

## 2023-09-20 PROCEDURE — 3079F DIAST BP 80-89 MM HG: CPT | Mod: CPTII,S$GLB,, | Performed by: NURSE PRACTITIONER

## 2023-09-20 PROCEDURE — 1160F PR REVIEW ALL MEDS BY PRESCRIBER/CLIN PHARMACIST DOCUMENTED: ICD-10-PCS | Mod: CPTII,S$GLB,, | Performed by: NURSE PRACTITIONER

## 2023-09-20 PROCEDURE — 1101F PT FALLS ASSESS-DOCD LE1/YR: CPT | Mod: CPTII,S$GLB,, | Performed by: NURSE PRACTITIONER

## 2023-09-20 PROCEDURE — G0439 PPPS, SUBSEQ VISIT: HCPCS | Mod: S$GLB,,, | Performed by: NURSE PRACTITIONER

## 2023-09-20 PROCEDURE — 1170F FXNL STATUS ASSESSED: CPT | Mod: CPTII,S$GLB,, | Performed by: NURSE PRACTITIONER

## 2023-09-20 PROCEDURE — 99999 PR PBB SHADOW E&M-EST. PATIENT-LVL V: ICD-10-PCS | Mod: PBBFAC,,, | Performed by: NURSE PRACTITIONER

## 2023-09-20 PROCEDURE — 3072F PR LOW RISK FOR RETINOPATHY: ICD-10-PCS | Mod: CPTII,S$GLB,, | Performed by: NURSE PRACTITIONER

## 2023-09-20 PROCEDURE — 3077F SYST BP >= 140 MM HG: CPT | Mod: CPTII,S$GLB,, | Performed by: NURSE PRACTITIONER

## 2023-09-20 PROCEDURE — 1101F PR PT FALLS ASSESS DOC 0-1 FALLS W/OUT INJ PAST YR: ICD-10-PCS | Mod: CPTII,S$GLB,, | Performed by: NURSE PRACTITIONER

## 2023-09-20 PROCEDURE — 3077F PR MOST RECENT SYSTOLIC BLOOD PRESSURE >= 140 MM HG: ICD-10-PCS | Mod: CPTII,S$GLB,, | Performed by: NURSE PRACTITIONER

## 2023-09-20 PROCEDURE — 4010F ACE/ARB THERAPY RXD/TAKEN: CPT | Mod: CPTII,S$GLB,, | Performed by: NURSE PRACTITIONER

## 2023-09-20 PROCEDURE — 1126F PR PAIN SEVERITY QUANTIFIED, NO PAIN PRESENT: ICD-10-PCS | Mod: CPTII,S$GLB,, | Performed by: NURSE PRACTITIONER

## 2023-09-20 PROCEDURE — 4010F PR ACE/ARB THEARPY RXD/TAKEN: ICD-10-PCS | Mod: CPTII,S$GLB,, | Performed by: NURSE PRACTITIONER

## 2023-09-20 PROCEDURE — 3008F BODY MASS INDEX DOCD: CPT | Mod: CPTII,S$GLB,, | Performed by: NURSE PRACTITIONER

## 2023-09-20 PROCEDURE — 3288F PR FALLS RISK ASSESSMENT DOCUMENTED: ICD-10-PCS | Mod: CPTII,S$GLB,, | Performed by: NURSE PRACTITIONER

## 2023-09-20 PROCEDURE — 99999 PR PBB SHADOW E&M-EST. PATIENT-LVL V: CPT | Mod: PBBFAC,,, | Performed by: NURSE PRACTITIONER

## 2023-09-20 PROCEDURE — 3288F FALL RISK ASSESSMENT DOCD: CPT | Mod: CPTII,S$GLB,, | Performed by: NURSE PRACTITIONER

## 2023-09-20 PROCEDURE — 1126F AMNT PAIN NOTED NONE PRSNT: CPT | Mod: CPTII,S$GLB,, | Performed by: NURSE PRACTITIONER

## 2023-09-20 PROCEDURE — 3072F LOW RISK FOR RETINOPATHY: CPT | Mod: CPTII,S$GLB,, | Performed by: NURSE PRACTITIONER

## 2023-09-20 PROCEDURE — 3008F PR BODY MASS INDEX (BMI) DOCUMENTED: ICD-10-PCS | Mod: CPTII,S$GLB,, | Performed by: NURSE PRACTITIONER

## 2023-09-20 PROCEDURE — 3079F PR MOST RECENT DIASTOLIC BLOOD PRESSURE 80-89 MM HG: ICD-10-PCS | Mod: CPTII,S$GLB,, | Performed by: NURSE PRACTITIONER

## 2023-09-20 PROCEDURE — 82043 UR ALBUMIN QUANTITATIVE: CPT | Performed by: FAMILY MEDICINE

## 2023-09-20 PROCEDURE — 1160F RVW MEDS BY RX/DR IN RCRD: CPT | Mod: CPTII,S$GLB,, | Performed by: NURSE PRACTITIONER

## 2023-09-20 PROCEDURE — 1159F MED LIST DOCD IN RCRD: CPT | Mod: CPTII,S$GLB,, | Performed by: NURSE PRACTITIONER

## 2023-09-20 PROCEDURE — 1170F PR FUNCTIONAL STATUS ASSESSED: ICD-10-PCS | Mod: CPTII,S$GLB,, | Performed by: NURSE PRACTITIONER

## 2023-09-20 PROCEDURE — G0439 PR MEDICARE ANNUAL WELLNESS SUBSEQUENT VISIT: ICD-10-PCS | Mod: S$GLB,,, | Performed by: NURSE PRACTITIONER

## 2023-09-20 PROCEDURE — 1159F PR MEDICATION LIST DOCUMENTED IN MEDICAL RECORD: ICD-10-PCS | Mod: CPTII,S$GLB,, | Performed by: NURSE PRACTITIONER

## 2023-09-20 RX ORDER — PREGABALIN 100 MG/1
CAPSULE ORAL
Qty: 90 CAPSULE | Refills: 1 | Status: CANCELLED | OUTPATIENT
Start: 2023-09-20

## 2023-09-20 NOTE — PROGRESS NOTES
"  Jono Gonzalez presented for an initial Medicare AWV and Health Risk Assessment   today. The following components were reviewed and updated:    Medical history  Family History  Social history  Allergies and Current Medications  Health Risk Assessment  Health Maintenance  Care Team    See Completed Assessments for Annual Wellness visit with in the encounter summary    The following assessments were completed:  Depression Screening  Cognitive function Screening  Timed Get Up Test  Whisper Test  Nutrition  Activities of Daily Living   PAQ      Vitals:    09/20/23 1020   BP: (!) 150/84   BP Location: Left arm   Patient Position: Sitting   BP Method: Large (Automatic)   Pulse: 82   SpO2: 95%   Weight: 135.4 kg (298 lb 8.1 oz)   Height: 5' 7" (1.702 m)     Body mass index is 46.75 kg/m².   ]    Pain level assessed and reviewed. Patient provided with non-opioid treatment options for relief of back pain; hydrocodone used only when absolutely necessary. Reviewed potential opioid use disorder risk factors. Patient instructed to follow up with PCP and/or Pain Medicine. Rates pain as a 5-6/10 most days, days with more strenuous activities, rates pain as a 10/10.    Physical Exam  Constitutional:       Appearance: Normal appearance. He is obese.   HENT:      Right Ear: There is impacted cerumen.      Left Ear: There is impacted cerumen.   Eyes:      Conjunctiva/sclera: Conjunctivae normal.   Pulmonary:      Effort: Pulmonary effort is normal. No respiratory distress.   Abdominal:      General: Bowel sounds are normal. There is no distension.      Tenderness: There is no abdominal tenderness. There is no guarding.   Musculoskeletal:      Cervical back: Normal range of motion.      Right lower leg: Edema present.      Left lower leg: Edema present.   Skin:     Capillary Refill: Capillary refill takes 2 to 3 seconds.   Neurological:      Mental Status: He is alert and oriented to person, place, and time.      Gait: Gait abnormal. "          Diagnoses and health risks identified today and associated recommendations/orders:  1. Encounter for preventive health examination  Screenings performed,  as noted above. Personal preventive testing needs reviewed.     2. Primary hypertension  Stable, followed by PCP.     3. Mixed hyperlipidemia  Stable, followed by PCP.     4. RBBB  Stable, followed by PCP.     5. Type 2 diabetes mellitus without complication, without long-term current use of insulin  Stable, followed by PCP.     6. Neuropathy involving both lower extremities  Stable, followed by PCP.     7. Bilateral hearing loss, unspecified hearing loss type  Reports he was approved five years ago for hearing aides, was not covered under insurance plan at that time, Complains of frequent difficulty with hearing.  - Ambulatory referral/consult to ENT; Future    8. Prostate cancer  Stable, followed by PCP.     9. Narcotic dependence  Stable, followed by PCP.     10. Venous stasis ulcer of left lower extremity  Stable, followed by PCP.     11. Chronic venous insufficiency  Stable, followed by PCP.     12. BPH with obstruction/lower urinary tract symptoms  Stable, followed by PCP.     13. Cellulitis of left lower extremity  Stable, followed by PCP.     14. Male hypogonadism  Stable, followed by PCP.     15. Acute pain of left knee  Stable, followed by PCP.     16. Chronic midline low back pain without sciatica  Stable, followed by PCP.     17. Gouty arthropathy  Stable, followed by PCP.     18. History of lumbar spinal fusion  Stable, followed by PCP.     19. Lymphedema of both lower extremities  Stable, followed by PCP.     20. DAVID (obstructive sleep apnea)  Stable, followed by PCP.     21. Morbid obesity with BMI of 45.0-49.9, adult  Stable, followed by PCP.     22. Bilateral impacted cerumen  - Ambulatory referral/consult to ENT; Future    I offered to discuss advanced care planning, including how to pick a person who would make decisions for you if you  were unable to make them for yourself, called a health care power of , and what kind of decisions you might make such as use of life sustaining treatments such as ventilators and tube feeding when faced with a life limiting illness recorded on a living will that they will need to know. (How you want to be cared for as you near the end of your natural life)     X Patient is interested in learning more about how to make advanced directives.  I provided them paperwork and offered to discuss this with them.    Provided Jono with a 5-10 year written screening schedule and personal prevention plan. Recommendations were developed using the USPSTF age appropriate recommendations. Education, counseling, and referrals were provided as needed.  After Visit Summary printed and given to patient which includes a list of additional screenings\tests needed.    Follow up in about 1 year (around 9/20/2024), or if symptoms worsen or fail to improve.      Brenda Zabala NP

## 2023-09-20 NOTE — PATIENT INSTRUCTIONS
Counseling and Referral of Other Preventative  (Italic type indicates deductible and co-insurance are waived)    Patient Name: Jono Gonzalez  Today's Date: 9/20/2023    Health Maintenance       Date Due Completion Date    Diabetes Urine Screening 02/08/2023 2/8/2022    Hemoglobin A1c 03/20/2023 9/20/2022    Eye Exam 09/27/2023 (Originally 7/27/2023) 7/27/2022    Influenza Vaccine (1) 10/27/2023 (Originally 9/1/2023) 10/18/2020    Shingles Vaccine (1 of 2) 08/24/2024 (Originally 6/28/2016) 5/3/2016    COVID-19 Vaccine (4 - Pfizer series) 08/24/2024 (Originally 12/27/2021) 11/1/2021    Pneumococcal Vaccines (Age 65+) (4 - PPSV23 or PCV20) 08/24/2024 (Originally 11/28/2021) 10/11/2018    Lipid Panel 08/24/2024 8/24/2023    Foot Exam 08/31/2024 8/31/2023    Low Dose Statin 09/20/2024 9/20/2023    TETANUS VACCINE 10/22/2024 10/22/2014    Colorectal Cancer Screening 07/30/2028 7/30/2018        Orders Placed This Encounter   Procedures    Ambulatory referral/consult to ENT       The following information is provided to all patients.  This information is to help you find resources for any of the problems found today that may be affecting your health:                Living healthy guide: www.[facility].[place].gov      Understanding Diabetes: www.diabetes.org      Eating healthy: www.cdc.gov/healthyweight      CDC home safety checklist: www.cdc.gov/steadi/patient.html      Agency on Aging: www.goea.[place].[facility]      Alcoholics anonymous (AA): www.aa.org      Physical Activity: www.shayy.nih.gov/xp3ntri      Tobacco use: www.quitwithusla.org

## 2023-09-23 ENCOUNTER — IMMUNIZATION (OUTPATIENT)
Dept: PRIMARY CARE CLINIC | Facility: CLINIC | Age: 68
End: 2023-09-23
Payer: COMMERCIAL

## 2023-09-23 DIAGNOSIS — Z23 NEEDS FLU SHOT: Primary | ICD-10-CM

## 2023-09-23 PROCEDURE — 90686 FLU VACCINE (QUAD) GREATER THAN OR EQUAL TO 3YO PRESERVATIVE FREE IM: ICD-10-PCS | Mod: S$GLB,,, | Performed by: FAMILY MEDICINE

## 2023-09-23 PROCEDURE — 90686 IIV4 VACC NO PRSV 0.5 ML IM: CPT | Mod: S$GLB,,, | Performed by: FAMILY MEDICINE

## 2023-09-23 PROCEDURE — 90471 IMMUNIZATION ADMIN: CPT | Mod: S$GLB,,, | Performed by: FAMILY MEDICINE

## 2023-09-23 PROCEDURE — 90471 FLU VACCINE (QUAD) GREATER THAN OR EQUAL TO 3YO PRESERVATIVE FREE IM: ICD-10-PCS | Mod: S$GLB,,, | Performed by: FAMILY MEDICINE

## 2023-10-03 DIAGNOSIS — G57.93 NEUROPATHY INVOLVING BOTH LOWER EXTREMITIES: ICD-10-CM

## 2023-10-03 DIAGNOSIS — E78.2 MIXED HYPERLIPIDEMIA: ICD-10-CM

## 2023-10-04 NOTE — TELEPHONE ENCOUNTER
No care due was identified.  [facility] Embedded Care Due Messages. Reference number: [id number].   10/03/2023 8:19:54 PM CDT

## 2023-10-05 DIAGNOSIS — G57.93 NEUROPATHY INVOLVING BOTH LOWER EXTREMITIES: ICD-10-CM

## 2023-10-05 NOTE — TELEPHONE ENCOUNTER
No care due was identified.  [facility] Embedded Care Due Messages. Reference number: [id number].   10/05/2023 1:04:52 PM CDT

## 2023-10-09 RX ORDER — ATORVASTATIN CALCIUM 40 MG/1
40 TABLET, FILM COATED ORAL NIGHTLY
Qty: 90 TABLET | Refills: 3 | Status: SHIPPED | OUTPATIENT
Start: 2023-10-09 | End: 2024-03-20 | Stop reason: SDUPTHER

## 2023-10-11 RX ORDER — PREGABALIN 100 MG/1
100 CAPSULE ORAL 3 TIMES DAILY
Qty: 90 CAPSULE | Refills: 3 | Status: SHIPPED | OUTPATIENT
Start: 2023-10-11

## 2023-10-12 RX ORDER — PREGABALIN 100 MG/1
CAPSULE ORAL
Qty: 90 CAPSULE | Refills: 1 | OUTPATIENT
Start: 2023-10-12

## 2023-11-05 ENCOUNTER — PATIENT MESSAGE (OUTPATIENT)
Dept: OTHER | Facility: OTHER | Age: 68
End: 2023-11-05
Payer: COMMERCIAL

## 2023-11-10 ENCOUNTER — OFFICE VISIT (OUTPATIENT)
Dept: URGENT CARE | Facility: CLINIC | Age: 68
End: 2023-11-10
Payer: COMMERCIAL

## 2023-11-10 VITALS
HEART RATE: 106 BPM | SYSTOLIC BLOOD PRESSURE: 158 MMHG | HEIGHT: 67 IN | DIASTOLIC BLOOD PRESSURE: 79 MMHG | RESPIRATION RATE: 20 BRPM | WEIGHT: 298 LBS | BODY MASS INDEX: 46.77 KG/M2 | TEMPERATURE: 99 F | OXYGEN SATURATION: 93 %

## 2023-11-10 DIAGNOSIS — J10.1 INFLUENZA A: Primary | ICD-10-CM

## 2023-11-10 LAB
CTP QC/QA: YES
CTP QC/QA: YES
POC MOLECULAR INFLUENZA A AGN: POSITIVE
POC MOLECULAR INFLUENZA B AGN: NEGATIVE
SARS-COV-2 AG RESP QL IA.RAPID: NEGATIVE

## 2023-11-10 PROCEDURE — 99214 OFFICE O/P EST MOD 30 MIN: CPT | Mod: S$GLB,,, | Performed by: NURSE PRACTITIONER

## 2023-11-10 PROCEDURE — 99214 PR OFFICE/OUTPT VISIT, EST, LEVL IV, 30-39 MIN: ICD-10-PCS | Mod: S$GLB,,, | Performed by: NURSE PRACTITIONER

## 2023-11-10 PROCEDURE — 87502 INFLUENZA DNA AMP PROBE: CPT | Mod: QW,S$GLB,, | Performed by: NURSE PRACTITIONER

## 2023-11-10 PROCEDURE — 87811 SARS CORONAVIRUS 2 ANTIGEN POCT, MANUAL READ: ICD-10-PCS | Mod: QW,S$GLB,, | Performed by: NURSE PRACTITIONER

## 2023-11-10 PROCEDURE — 87502 POCT INFLUENZA A/B MOLECULAR: ICD-10-PCS | Mod: QW,S$GLB,, | Performed by: NURSE PRACTITIONER

## 2023-11-10 PROCEDURE — 87811 SARS-COV-2 COVID19 W/OPTIC: CPT | Mod: QW,S$GLB,, | Performed by: NURSE PRACTITIONER

## 2023-11-10 RX ORDER — OSELTAMIVIR PHOSPHATE 75 MG/1
75 CAPSULE ORAL 2 TIMES DAILY
Qty: 10 CAPSULE | Refills: 0 | Status: SHIPPED | OUTPATIENT
Start: 2023-11-10 | End: 2023-11-15

## 2023-11-10 RX ORDER — OSELTAMIVIR PHOSPHATE 75 MG/1
75 CAPSULE ORAL 2 TIMES DAILY
Qty: 10 CAPSULE | Refills: 0 | Status: SHIPPED | OUTPATIENT
Start: 2023-11-10 | End: 2023-11-10

## 2023-11-10 NOTE — PATIENT INSTRUCTIONS
"    FLU    Your Rapid FLU test was POSITIVE FOR INFLUENZA    -  Take full course of Tamilfu as prescribed.  If symptoms began over 48 hours ago, you are not eligible for Tamiflu.  Symptomatic management is recommended as treatment.    - Rest at home.     - Drink plenty of fluids so you won't get dehydrated.    - Do not share any utensils or share drinks     - Wash hands frequently    - you can take plain Mucinex (guaifenesin) 1200 mg twice a day to help loosen mucous.     Avoid taking Decongestants such as Sudafed, pseudoephedrine, phenylephrine or meds that say "cold," "sinus" or "-D".           - Fever/Pain recommendations:  Alternate Tylenol or Ibuprofen as directed for fever/pain.     Take ibuprofen/Motrin/Advil every 6-8 hours for pain and inflammation.  Do not take ibuprofen if you have a history of GI bleeding, kidney disease, or if you take blood thinners.    You can also take acetaminophen/Tylenol every 6-8 hours for added pain relief. Avoid tylenol if you have a history of liver disease.        - Cough recommendations:  Warm tea with honey can help with cough. Honey is a natural cough suppressant.    Dextromethorphan (DM) is a cough suppressant over the counter (ie. mucinex DM, delsym).       -Sore throat recommendations: Warm fluids, warm salt water gargles, throat lozenges, tea, honey, soup, or drinking something cold or frozen.  Throat lozenges or sprays help reduce pain. Gargling with warm saltwater (1/4 teaspoon of salt in 1/2 cup of warm water) or an OTC anesthetic gargle may be useful for irritation.    - Follow up with your PCP or specialty clinic as directed in the next 1-2 weeks if not improved or as needed.  You can call [phone] to schedule an appointment with the appropriate provider.      - If your condition worsens or fails to improve we recommend that you receive another evaluation at the ER immediately or contact your PCP to discuss your concerns or return here.    When to seek " medical advice  Call your healthcare provider right away if any of these occur:  Fever that is poorly controlled with OTC fever reducing medication  New or worsening ear pain, sinus pain, or headache  Stiff neck  You can't swallow liquids or you can't open your mouth wide because of throat pain  Signs of dehydration. These include very dark urine or no urine, sunken eyes, and dizziness.  Trouble breathing or noisy breathing  Muffled voice  Rash

## 2023-11-10 NOTE — PROGRESS NOTES
"Subjective:      Patient ID: Jono Gonzalez is a 68 y.o. male.    Vitals:  height is 5' 7" (1.702 m) and weight is 135.2 kg (298 lb). His oral temperature is 98.7 °F (37.1 °C). His blood pressure is 158/79 (abnormal) and his pulse is 106. His respiration is 20 and oxygen saturation is 93% (abnormal).     Chief Complaint: Cough (Entered by patient)    Cough  This is a new problem. The current episode started in the past 7 days (started on wed). Associated symptoms include nasal congestion, postnasal drip and shortness of breath. Pertinent negatives include no chest pain, chills, ear congestion, ear pain, fever, heartburn, hemoptysis, rash, rhinorrhea, sweats, weight loss or wheezing.       Constitution: Negative for chills and fever.   HENT:  Positive for postnasal drip. Negative for ear pain.    Cardiovascular:  Negative for chest pain.   Respiratory:  Positive for cough and shortness of breath. Negative for bloody sputum and wheezing.    Gastrointestinal:  Negative for heartburn.   Skin:  Negative for rash.      Objective:     Physical Exam   HENT:   Head: Normocephalic.   Neck: Neck supple.   Pulmonary/Chest: Effort normal and breath sounds normal.   Abdominal: Normal appearance.   Neurological: He is alert.       Assessment:     1. Influenza A        Plan:   Mr. Gonzalez presents for flu like symptoms that has been ongoing for 5 days. Associated symptoms: fever, chills, SOB, PND, body aches. Has not taking any otc meds. Exam was negative. Flu test positive for Flu A. Covid Negative. Will start on Tamiflu.     Influenza A  -     SARS Coronavirus 2 Antigen, POCT Manual Read  -     POCT Influenza A/B MOLECULAR  -     oseltamivir (TAMIFLU) 75 MG capsule; Take 1 capsule (75 mg total) by mouth 2 (two) times daily. for 5 days  Dispense: 10 capsule; Refill: 0      Results for orders placed or performed in visit on 11/10/23   SARS Coronavirus 2 Antigen, POCT Manual Read   Result Value Ref Range    SARS Coronavirus 2 Antigen " "Negative Negative     Acceptable Yes    POCT Influenza A/B MOLECULAR   Result Value Ref Range    POC Molecular Influenza A Ag Positive (A) Negative, Not Reported    POC Molecular Influenza B Ag Negative Negative, Not Reported     Acceptable Yes      Patient Instructions       FLU    Your Rapid FLU test was POSITIVE FOR INFLUENZA    -  Take full course of Tamilfu as prescribed.  If symptoms began over 48 hours ago, you are not eligible for Tamiflu.  Symptomatic management is recommended as treatment.    - Rest at home.     - Drink plenty of fluids so you won't get dehydrated.    - Do not share any utensils or share drinks     - Wash hands frequently    - you can take plain Mucinex (guaifenesin) 1200 mg twice a day to help loosen mucous.     Avoid taking Decongestants such as Sudafed, pseudoephedrine, phenylephrine or meds that say "cold," "sinus" or "-D".           - Fever/Pain recommendations:  Alternate Tylenol or Ibuprofen as directed for fever/pain.     Take ibuprofen/Motrin/Advil every 6-8 hours for pain and inflammation.  Do not take ibuprofen if you have a history of GI bleeding, kidney disease, or if you take blood thinners.    You can also take acetaminophen/Tylenol every 6-8 hours for added pain relief. Avoid tylenol if you have a history of liver disease.        - Cough recommendations:  Warm tea with honey can help with cough. Honey is a natural cough suppressant.    Dextromethorphan (DM) is a cough suppressant over the counter (ie. mucinex DM, delsym).       -Sore throat recommendations: Warm fluids, warm salt water gargles, throat lozenges, tea, honey, soup, or drinking something cold or frozen.  Throat lozenges or sprays help reduce pain. Gargling with warm saltwater (1/4 teaspoon of salt in 1/2 cup of warm water) or an OTC anesthetic gargle may be useful for irritation.    - Follow up with your PCP or specialty clinic as directed in the next 1-2 weeks if not improved or " as needed.  You can call [phone] to schedule an appointment with the appropriate provider.      - If your condition worsens or fails to improve we recommend that you receive another evaluation at the ER immediately or contact your PCP to discuss your concerns or return here.    When to seek medical advice  Call your healthcare provider right away if any of these occur:  Fever that is poorly controlled with OTC fever reducing medication  New or worsening ear pain, sinus pain, or headache  Stiff neck  You can't swallow liquids or you can't open your mouth wide because of throat pain  Signs of dehydration. These include very dark urine or no urine, sunken eyes, and dizziness.  Trouble breathing or noisy breathing  Muffled voice  Rash

## 2023-11-17 ENCOUNTER — PATIENT MESSAGE (OUTPATIENT)
Dept: ADMINISTRATIVE | Facility: HOSPITAL | Age: 68
End: 2023-11-17
Payer: COMMERCIAL

## 2023-11-19 ENCOUNTER — TELEPHONE (OUTPATIENT)
Dept: PHARMACY | Facility: CLINIC | Age: 68
End: 2023-11-19
Payer: COMMERCIAL

## 2023-11-20 ENCOUNTER — HOSPITAL ENCOUNTER (OUTPATIENT)
Facility: HOSPITAL | Age: 68
Discharge: HOME | End: 2023-11-20
Attending: COLON & RECTAL SURGERY | Admitting: COLON & RECTAL SURGERY
Payer: COMMERCIAL

## 2023-11-20 ENCOUNTER — ANESTHESIA EVENT (OUTPATIENT)
Dept: ENDOSCOPY | Facility: HOSPITAL | Age: 68
End: 2023-11-20
Payer: COMMERCIAL

## 2023-11-20 ENCOUNTER — ANESTHESIA (OUTPATIENT)
Dept: ENDOSCOPY | Facility: HOSPITAL | Age: 68
End: 2023-11-20
Payer: COMMERCIAL

## 2023-11-20 VITALS
OXYGEN SATURATION: 96 % | TEMPERATURE: 98 F | WEIGHT: 289 LBS | HEIGHT: 67 IN | BODY MASS INDEX: 45.36 KG/M2 | SYSTOLIC BLOOD PRESSURE: 130 MMHG | DIASTOLIC BLOOD PRESSURE: 74 MMHG | RESPIRATION RATE: 18 BRPM | HEART RATE: 89 BPM

## 2023-11-20 DIAGNOSIS — Z86.010 PERSONAL HISTORY OF COLONIC POLYPS: ICD-10-CM

## 2023-11-20 LAB — POCT GLUCOSE: 111 MG/DL (ref 70–110)

## 2023-11-20 PROCEDURE — 63600175 PHARM REV CODE 636 W HCPCS: Performed by: NURSE ANESTHETIST, CERTIFIED REGISTERED

## 2023-11-20 PROCEDURE — E9220 PRA ENDO ANESTHESIA: ICD-10-PCS | Mod: ,,, | Performed by: NURSE ANESTHETIST, CERTIFIED REGISTERED

## 2023-11-20 PROCEDURE — 25000003 PHARM REV CODE 250: Performed by: NURSE ANESTHETIST, CERTIFIED REGISTERED

## 2023-11-20 PROCEDURE — G0105 COLORECTAL SCRN; HI RISK IND: HCPCS | Mod: ,,, | Performed by: COLON & RECTAL SURGERY

## 2023-11-20 PROCEDURE — 37000008 HC ANESTHESIA 1ST 15 MINUTES: Performed by: COLON & RECTAL SURGERY

## 2023-11-20 PROCEDURE — 37000009 HC ANESTHESIA EA ADD 15 MINS: Performed by: COLON & RECTAL SURGERY

## 2023-11-20 PROCEDURE — E9220 PRA ENDO ANESTHESIA: HCPCS | Mod: ,,, | Performed by: NURSE ANESTHETIST, CERTIFIED REGISTERED

## 2023-11-20 PROCEDURE — 82962 GLUCOSE BLOOD TEST: CPT | Performed by: COLON & RECTAL SURGERY

## 2023-11-20 PROCEDURE — G0105 COLORECTAL SCRN; HI RISK IND: ICD-10-PCS | Mod: ,,, | Performed by: COLON & RECTAL SURGERY

## 2023-11-20 PROCEDURE — G0105 COLORECTAL SCRN; HI RISK IND: HCPCS | Performed by: COLON & RECTAL SURGERY

## 2023-11-20 RX ORDER — PROPOFOL 10 MG/ML
VIAL (ML) INTRAVENOUS
Status: DISCONTINUED | OUTPATIENT
Start: 2023-11-20 | End: 2023-11-20

## 2023-11-20 RX ORDER — LIDOCAINE HYDROCHLORIDE 20 MG/ML
INJECTION INTRAVENOUS
Status: DISCONTINUED | OUTPATIENT
Start: 2023-11-20 | End: 2023-11-20

## 2023-11-20 RX ORDER — SODIUM CHLORIDE 9 MG/ML
INJECTION, SOLUTION INTRAVENOUS CONTINUOUS
Status: DISCONTINUED | OUTPATIENT
Start: 2023-11-20 | End: 2023-11-20 | Stop reason: HOSPADM

## 2023-11-20 RX ADMIN — PROPOFOL 70 MG: 10 INJECTION, EMULSION INTRAVENOUS at 09:11

## 2023-11-20 RX ADMIN — LIDOCAINE HYDROCHLORIDE 30 MG: 20 INJECTION INTRAVENOUS at 09:11

## 2023-11-20 RX ADMIN — SODIUM CHLORIDE: 0.9 INJECTION, SOLUTION INTRAVENOUS at 08:11

## 2023-11-20 NOTE — TRANSFER OF CARE
"Anesthesia Transfer of Care Note    Patient: Jono Gonzalez    Procedure(s) Performed: Procedure(s) (LRB):  COLONOSCOPY (N/A)    Patient location: PACU    Anesthesia Type: general    Transport from OR: Transported from OR on room air with adequate spontaneous ventilation    Post pain: adequate analgesia    Post assessment: no apparent anesthetic complications and tolerated procedure well    Post vital signs: stable    Level of consciousness: awake, alert and oriented    Nausea/Vomiting: no nausea/vomiting    Complications: none    Transfer of care protocol was followed      Last vitals: Visit Vitals  /63   Pulse 97   Temp 36.6 °C (97.9 °F)   Resp 16   Ht 5' 7" (1.702 m)   Wt 131.1 kg (289 lb)   SpO2 96%   BMI 45.26 kg/m²     "

## 2023-11-20 NOTE — ANESTHESIA POSTPROCEDURE EVALUATION
Anesthesia Post Evaluation    Patient: Jono Gonzalez    Procedure(s) Performed: Procedure(s) (LRB):  COLONOSCOPY (N/A)    Final Anesthesia Type: general      Patient location during evaluation: GI PACU  Patient participation: Yes- Able to Participate  Level of consciousness: awake and alert  Post-procedure vital signs: reviewed and stable  Pain management: adequate  Airway patency: patent    PONV status at discharge: No PONV  Anesthetic complications: no      Cardiovascular status: blood pressure returned to baseline  Respiratory status: unassisted  Hydration status: euvolemic  Follow-up not needed.          Vitals Value Taken Time   /63 11/20/23 0946   Temp  11/20/23 1000   Pulse 97 11/20/23 0946   Resp 16 11/20/23 0946   SpO2 96 % 11/20/23 0946         No case tracking events are documented in the log.      Pain/Keshia Score: Keshia Score: 9 (11/20/2023  9:46 AM)

## 2023-11-20 NOTE — TELEPHONE ENCOUNTER
Assessed patient medication adherence for population health /[facility] medication adherence project

## 2023-11-20 NOTE — H&P
COLONOSCOPY HISTORY AND PHYSICAL EXAM    Procedure : Colonoscopy      INDICATIONS: personal history of colon polyps    Family Hx of CRC: denies    Last Colonoscopy:  5y ago  Findings: benign polyps by patient report  Sedation Problems: NO  Fam Hx of Sedation Problems: NO     Past Medical History:   Diagnosis Date    Arthritis     Cataract     Chronic back pain     Diabetes mellitus     Edema of both lower extremities 5/24/2021    Gout attack     Hyperlipidemia     Hypertension     Prostate cancer 9/20/2023    Sleep apnea     Swelling of lower limb 10/4/2021    Trouble in sleeping     Type 2 diabetes mellitus     Wound infection 6/3/2022     Family History   Problem Relation Age of Onset    Diabetes Mother     Heart disease Mother     Thyroid disease Mother     Diabetes Sister     Heart disease Father     Diabetes Father     Heart disease Brother     Heart disease Sister     Diabetes Sister     Stroke Sister      Social History     Socioeconomic History    Marital status:    Tobacco Use    Smoking status: Never    Smokeless tobacco: Never   Substance and Sexual Activity    Alcohol use: Yes     Comment: occ    Drug use: No     Social Determinants of Health     Financial Resource Strain: Low Risk  (9/20/2023)    Overall Financial Resource Strain (CARDIA)     Difficulty of Paying Living Expenses: Not hard at all   Food Insecurity: No Food Insecurity (9/20/2023)    Hunger Vital Sign     Worried About Running Out of Food in the Last Year: Never true     Ran Out of Food in the Last Year: Never true   Transportation Needs: No Transportation Needs (9/20/2023)    PRAPARE - Transportation     Lack of Transportation (Medical): No     Lack of Transportation (Non-Medical): No   Physical Activity: Insufficiently Active (9/20/2023)    Exercise Vital Sign     Days of Exercise per Week: 2 days     Minutes of Exercise per Session: 60 min   Stress: Stress Concern Present (9/20/2023)    [employer] [place] [employer] -  "Occupational Stress Questionnaire     Feeling of Stress : To some extent   Social Connections: Moderately Isolated (9/20/2023)    Social Connection and Isolation Panel [NHANES]     Frequency of Communication with Friends and Family: More than three times a week     Frequency of Social Gatherings with Friends and Family: More than three times a week     Attends Orthodoxy Services: Never     Active Member of Clubs or Organizations: No     Attends Club or Organization Meetings: Never     Marital Status:    Housing Stability: Low Risk  (9/20/2023)    Housing Stability Vital Sign     Unable to Pay for Housing in the Last Year: No     Number of Places Lived in the Last Year: 1     Unstable Housing in the Last Year: No       Review of Systems - Negative except   Respiratory ROS: no dyspnea  Cardiovascular ROS: no exertional chest pain  Gastrointestinal ROS: NO abdominal discomfort,  NO rectal bleeding  Musculoskeletal ROS: no muscular pain  Neurological ROS: no recent stroke    Physical Exam:  BP (!) 150/87 (BP Location: Left arm, Patient Position: Lying)   Pulse 82   Temp 97.9 °F (36.6 °C)   Resp 16   Ht 5' 7" (1.702 m)   Wt 131.1 kg (289 lb)   SpO2 96%   BMI 45.26 kg/m²     General: Alert and oriented x 3. No acute distress. Well-nourished.  HEENT: EOMI. Sclera anicteric. Moist mucous membranes. No scleral icterus. No cervical lymphadenopathy.  Lungs: Clear to auscultation bilaterally. No accessory muscle use.  Cardiac: Regular rate and rhythm. No murmur. No JVD.  Abdomen: soft, non tender  Extremities: No edema. non-tender.  Skin: No rashes or lesions. Warm.  Neuro: No focal neurological deficits. CN II-XII grossly intact, but not individually tested.  Psych: Cooperative. Appropriate mood and affect.    ASA:  II    PLAN  COLONOSCOPY.    SedationPlan :MAC    The details of the procedure, the possible need for biopsy or polypectomy and the potential risks including bleeding, perforation, missed polyps were " discussed in detail.    Jean Moreno MD  Colon & Rectal Surgery Fellow

## 2023-11-20 NOTE — PROVATION PATIENT INSTRUCTIONS
Discharge Summary/Instructions after an Endoscopic Procedure  Patient Name: Jono Gonzalez  Patient MRN: [id number]  Patient YOB: 1955 Monday, November 20, 2023  Paulina Orosco MD  Dear patient,  As a result of recent federal legislation (The Federal Cures Act), you may   receive lab or pathology results from your procedure in your [facility]   account before your physician is able to contact you. Your physician or   their representative will relay the results to you with their   recommendations at their soonest availability.  Thank you,  RESTRICTIONS:  During your procedure today, you received medications for sedation.  These   medications may affect your judgment, balance and coordination.  Therefore,   for 24 hours, you have the following restrictions:   - DO NOT drive a car, operate machinery, make legal/financial decisions,   sign important papers or drink alcohol.    ACTIVITY:  Today: no heavy lifting, straining or running due to procedural   sedation/anesthesia.  The following day: return to full activity including work.  DIET:  Eat and drink normally unless instructed otherwise.     TREATMENT FOR COMMON SIDE EFFECTS:  - Mild abdominal pain, nausea, belching, bloating or excessive gas:  rest,   eat lightly and use a heating pad.  - Sore Throat: treat with throat lozenges and/or gargle with warm salt   water.  - Because air was used during the procedure, expelling large amounts of air   from your rectum or belching is normal.  - If a bowel prep was taken, you may not have a bowel movement for 1-3 days.    This is normal.  SYMPTOMS TO WATCH FOR AND REPORT TO YOUR PHYSICIAN:  1. Abdominal pain or bloating, other than gas cramps.  2. Chest pain.  3. Back pain.  4. Signs of infection such as: chills or fever occurring within 24 hours   after the procedure.  5. Rectal bleeding, which would show as bright red, maroon, or black stools.   (A tablespoon of blood from the rectum is not serious, especially  if   hemorrhoids are present.)  6. Vomiting.  7. Weakness or dizziness.  GO DIRECTLY TO THE NEAREST EMERGENCY ROOM IF YOU HAVE ANY OF THE FOLLOWING:      Difficulty breathing              Chills and/or fever over 101 F   Persistent vomiting and/or vomiting blood   Severe abdominal pain   Severe chest pain   Black, tarry stools   Bleeding- more than one tablespoon   Any other symptom or condition that you feel may need urgent attention  Your doctor recommends these additional instructions:  If any biopsies were taken, your doctors clinic will contact you in 1 to 2   weeks with any results.  - Discharge patient to home.   - Resume previous diet.   - Continue present medications.   - Repeat colonoscopy in 5 years for surveillance.   - Written discharge instructions were provided to the patient.   - The signs and symptoms of potential delayed complications were discussed   with the patient.   - Patient has a contact number available for emergencies.   - Return to normal activities tomorrow.  For questions, problems or results please call your physician - Paulina Orosco MD at Work:  [phone].  [facility], EMERGENCY ROOM PHONE NUMBER: [phone]  IF A COMPLICATION OR EMERGENCY SITUATION ARISES AND YOU ARE UNABLE TO REACH   YOUR PHYSICIAN - GO DIRECTLY TO THE EMERGENCY ROOM.  Paulina Orosco MD  11/20/2023 9:46:37 AM  This report has been verified and signed electronically.  Dear patient,  As a result of recent federal legislation (The Federal Cures Act), you may   receive lab or pathology results from your procedure in your [facility]   account before your physician is able to contact you. Your physician or   their representative will relay the results to you with their   recommendations at their soonest availability.  Thank you,  PROVATION

## 2023-11-20 NOTE — ANESTHESIA PREPROCEDURE EVALUATION
11/20/2023  Jono Gonzalez is a 68 y.o., male.Pre-operative evaluation for Procedure(s) (LRB):  COLONOSCOPY (N/A)    Jono Gonzalez is a 68 y.o. male     Patient Active Problem List   Diagnosis    Hypertension    BPH with obstruction/lower urinary tract symptoms    Chronic venous insufficiency    Gouty arthropathy    HLD (hyperlipidemia)    DAVID (obstructive sleep apnea)    Type 2 diabetes mellitus without complication, without long-term current use of insulin    RBBB    Wheezing    Narcotic dependence    Male hypogonadism    Chronic back pain    Acute pain of left knee    History of lumbar spinal fusion    Lymphedema of both lower extremities    Venous stasis ulcer of left lower extremity    Cellulitis of left lower extremity    Prostate cancer       Review of patient's allergies indicates:  No Known Allergies    No current facility-administered medications on file prior to encounter.     Current Outpatient Medications on File Prior to Encounter   Medication Sig Dispense Refill    allopurinoL (ZYLOPRIM) 300 MG tablet Take 1 tablet (300 mg total) by mouth once daily. 90 tablet 3    anastrozole (ARIMIDEX) 1 mg Tab Take 1 mg by mouth once daily.      aspirin (ECOTRIN) 81 MG EC tablet Take 81 mg by mouth once daily.      clotrimazole-betamethasone 1-0.05% (LOTRISONE) cream Apply topically once daily. To feet and toenails 45 g 1    furosemide (LASIX) 40 MG tablet Take 1 tablet (40 mg total) by mouth 2 (two) times a day. 90 tablet 3    HYDROcodone-acetaminophen (NORCO)  mg per tablet Take 1 tablet by mouth.      hydrocodone-acetaminophen 5-325mg (NORCO) 5-325 mg per tablet TK 1 T PO  Q 6 TO 8 H  0    ibuprofen (ADVIL,MOTRIN) 600 MG tablet TAKE 1 TABLET(600 MG) BY MOUTH EVERY 6 HOURS WITH FOOD AS NEEDED FOR PAIN OR INFLAMMATION 90 tablet 2    metFORMIN (GLUCOPHAGE-XR) 500 MG ER 24hr tablet Take 1 tablet (500 mg  total) by mouth 2 (two) times daily with meals. 180 tablet 3    naproxen (NAPROSYN) 500 MG tablet Take 500 mg by mouth 2 (two) times daily as needed.      olmesartan (BENICAR) 40 MG tablet Take 1 tablet (40 mg total) by mouth once daily. Blood pressure 90 tablet 3    semaglutide (OZEMPIC) 1 mg/dose (4 mg/3 mL) Inject 1 mg into the skin every 7 days. 4 pen 2    spironolactone (ALDACTONE) 25 MG tablet Take 0.5 tablets (12.5 mg total) by mouth once daily. 15 tablet 3       Past Surgical History:   Procedure Laterality Date    BACK SURGERY      lumbar fusion      X 2       Social History     Socioeconomic History    Marital status:    Tobacco Use    Smoking status: Never    Smokeless tobacco: Never   Substance and Sexual Activity    Alcohol use: Yes     Comment: occ    Drug use: No     Social Determinants of Health     Financial Resource Strain: Low Risk  (9/20/2023)    Overall Financial Resource Strain (CARDIA)     Difficulty of Paying Living Expenses: Not hard at all   Food Insecurity: No Food Insecurity (9/20/2023)    Hunger Vital Sign     Worried About Running Out of Food in the Last Year: Never true     Ran Out of Food in the Last Year: Never true   Transportation Needs: No Transportation Needs (9/20/2023)    PRAPARE - Transportation     Lack of Transportation (Medical): No     Lack of Transportation (Non-Medical): No   Physical Activity: Insufficiently Active (9/20/2023)    Exercise Vital Sign     Days of Exercise per Week: 2 days     Minutes of Exercise per Session: 60 min   Stress: Stress Concern Present (9/20/2023)    [employer] [place] [employer] - Occupational Stress Questionnaire     Feeling of Stress : To some extent   Social Connections: Moderately Isolated (9/20/2023)    Social Connection and Isolation Panel [NHANES]     Frequency of Communication with Friends and Family: More than three times a week     Frequency of Social Gatherings with Friends and Family: More than three times a week  "    Attends Tenriism Services: Never     Active Member of Clubs or Organizations: No     Attends Club or Organization Meetings: Never     Marital Status:    Housing Stability: Low Risk  (2023)    Housing Stability Vital Sign     Unable to Pay for Housing in the Last Year: No     Number of Places Lived in the Last Year: 1     Unstable Housing in the Last Year: No         CBC: No results for input(s): "WBC", "RBC", "HGB", "HCT", "PLT", "MCV", "MCH", "MCHC" in the last 72 hours.    CMP: No results for input(s): "NA", "K", "CL", "CO2", "BUN", "CREATININE", "GLU", "MG", "PHOS", "CALCIUM", "ALBUMIN", "PROT", "ALKPHOS", "ALT", "AST", "BILITOT" in the last 72 hours.    INR  No results for input(s): "PT", "INR", "PROTIME", "APTT" in the last 72 hours.        Diagnostic Studies:      EKD Echo:  No results found for this or any previous visit.        Pre-op Assessment    I have reviewed the Patient Summary Reports.    I have reviewed the NPO Status.      Review of Systems  Anesthesia Hx:  No problems with previous Anesthesia   History of prior surgery of interest to airway management or planning:  Previous anesthesia: General        Denies Family Hx of Anesthesia complications.    Denies Personal Hx of Anesthesia complications.                    Cardiovascular:     Hypertension                                        Pulmonary:        Sleep Apnea                Endocrine:  Diabetes               Physical Exam  General: Well nourished, Cooperative, Alert and Oriented    Airway:  Mallampati: III   Mouth Opening: Normal  TM Distance: Normal  Tongue: Normal  Neck ROM: Normal ROM        Anesthesia Plan  Type of Anesthesia, risks & benefits discussed:    Anesthesia Type: Gen Natural Airway  Intra-op Monitoring Plan: Standard ASA Monitors  Post Op Pain Control Plan: multimodal analgesia  Induction:  IV  Informed Consent: Informed consent signed with the Patient and all parties understand the risks and agree with " anesthesia plan.  All questions answered.   ASA Score: 3    Ready For Surgery From Anesthesia Perspective.     .

## 2023-11-30 ENCOUNTER — TELEPHONE (OUTPATIENT)
Dept: PHARMACY | Facility: CLINIC | Age: 68
End: 2023-11-30
Payer: COMMERCIAL

## 2023-11-30 NOTE — TELEPHONE ENCOUNTER
Assessed patient medication adherence for population health /[facility] medication adherence project

## 2023-12-06 ENCOUNTER — OFFICE VISIT (OUTPATIENT)
Dept: ORTHOPEDICS | Facility: CLINIC | Age: 68
End: 2023-12-06
Payer: COMMERCIAL

## 2023-12-06 DIAGNOSIS — M17.0 PRIMARY OSTEOARTHRITIS OF BOTH KNEES: Primary | ICD-10-CM

## 2023-12-06 DIAGNOSIS — E11.9 TYPE 2 DIABETES MELLITUS WITHOUT COMPLICATION, WITHOUT LONG-TERM CURRENT USE OF INSULIN: ICD-10-CM

## 2023-12-06 PROCEDURE — 3072F LOW RISK FOR RETINOPATHY: CPT | Mod: CPTII,S$GLB,, | Performed by: PHYSICIAN ASSISTANT

## 2023-12-06 PROCEDURE — 99214 PR OFFICE/OUTPT VISIT, EST, LEVL IV, 30-39 MIN: ICD-10-PCS | Mod: 25,S$GLB,, | Performed by: PHYSICIAN ASSISTANT

## 2023-12-06 PROCEDURE — 20610 PR DRAIN/INJECT LARGE JOINT/BURSA: ICD-10-PCS | Mod: 50,S$GLB,, | Performed by: PHYSICIAN ASSISTANT

## 2023-12-06 PROCEDURE — 1160F RVW MEDS BY RX/DR IN RCRD: CPT | Mod: CPTII,S$GLB,, | Performed by: PHYSICIAN ASSISTANT

## 2023-12-06 PROCEDURE — 1160F PR REVIEW ALL MEDS BY PRESCRIBER/CLIN PHARMACIST DOCUMENTED: ICD-10-PCS | Mod: CPTII,S$GLB,, | Performed by: PHYSICIAN ASSISTANT

## 2023-12-06 PROCEDURE — 99999 PR PBB SHADOW E&M-EST. PATIENT-LVL III: ICD-10-PCS | Mod: PBBFAC,,, | Performed by: PHYSICIAN ASSISTANT

## 2023-12-06 PROCEDURE — 4010F PR ACE/ARB THEARPY RXD/TAKEN: ICD-10-PCS | Mod: CPTII,S$GLB,, | Performed by: PHYSICIAN ASSISTANT

## 2023-12-06 PROCEDURE — 3066F PR DOCUMENTATION OF TREATMENT FOR NEPHROPATHY: ICD-10-PCS | Mod: CPTII,S$GLB,, | Performed by: PHYSICIAN ASSISTANT

## 2023-12-06 PROCEDURE — 1159F PR MEDICATION LIST DOCUMENTED IN MEDICAL RECORD: ICD-10-PCS | Mod: CPTII,S$GLB,, | Performed by: PHYSICIAN ASSISTANT

## 2023-12-06 PROCEDURE — 1125F PR PAIN SEVERITY QUANTIFIED, PAIN PRESENT: ICD-10-PCS | Mod: CPTII,S$GLB,, | Performed by: PHYSICIAN ASSISTANT

## 2023-12-06 PROCEDURE — 4010F ACE/ARB THERAPY RXD/TAKEN: CPT | Mod: CPTII,S$GLB,, | Performed by: PHYSICIAN ASSISTANT

## 2023-12-06 PROCEDURE — 99214 OFFICE O/P EST MOD 30 MIN: CPT | Mod: 25,S$GLB,, | Performed by: PHYSICIAN ASSISTANT

## 2023-12-06 PROCEDURE — 3061F PR NEG MICROALBUMINURIA RESULT DOCUMENTED/REVIEW: ICD-10-PCS | Mod: CPTII,S$GLB,, | Performed by: PHYSICIAN ASSISTANT

## 2023-12-06 PROCEDURE — 1125F AMNT PAIN NOTED PAIN PRSNT: CPT | Mod: CPTII,S$GLB,, | Performed by: PHYSICIAN ASSISTANT

## 2023-12-06 PROCEDURE — 3072F PR LOW RISK FOR RETINOPATHY: ICD-10-PCS | Mod: CPTII,S$GLB,, | Performed by: PHYSICIAN ASSISTANT

## 2023-12-06 PROCEDURE — 3066F NEPHROPATHY DOC TX: CPT | Mod: CPTII,S$GLB,, | Performed by: PHYSICIAN ASSISTANT

## 2023-12-06 PROCEDURE — 3061F NEG MICROALBUMINURIA REV: CPT | Mod: CPTII,S$GLB,, | Performed by: PHYSICIAN ASSISTANT

## 2023-12-06 PROCEDURE — 20610 DRAIN/INJ JOINT/BURSA W/O US: CPT | Mod: 50,S$GLB,, | Performed by: PHYSICIAN ASSISTANT

## 2023-12-06 PROCEDURE — 99999 PR PBB SHADOW E&M-EST. PATIENT-LVL III: CPT | Mod: PBBFAC,,, | Performed by: PHYSICIAN ASSISTANT

## 2023-12-06 PROCEDURE — 1159F MED LIST DOCD IN RCRD: CPT | Mod: CPTII,S$GLB,, | Performed by: PHYSICIAN ASSISTANT

## 2023-12-07 RX ORDER — BETAMETHASONE SODIUM PHOSPHATE AND BETAMETHASONE ACETATE 3; 3 MG/ML; MG/ML
12 INJECTION, SUSPENSION INTRA-ARTICULAR; INTRALESIONAL; INTRAMUSCULAR; SOFT TISSUE
Status: COMPLETED | OUTPATIENT
Start: 2023-12-07 | End: 2023-12-07

## 2023-12-07 RX ADMIN — BETAMETHASONE SODIUM PHOSPHATE AND BETAMETHASONE ACETATE 12 MG: 3; 3 INJECTION, SUSPENSION INTRA-ARTICULAR; INTRALESIONAL; INTRAMUSCULAR; SOFT TISSUE at 07:12

## 2023-12-07 NOTE — PROGRESS NOTES
SUBJECTIVE:     Chief Complaint & History of Present Illness:  Jono Gonzalez is a Established patient 68 y.o. male who is seen here today with a complaint of    Chief Complaint   Patient presents with    Left Knee - Pain    Right Knee - Pain    .  He is patient well-known to me was last seen treated the clinic for this condition 01/11/2023 at that time he would undergone bilateral cortisone injections with very good results.  He is begun to have return of pain soreness in bilateral knees requesting repeat injection therapy today  On a scale of 1-10, with 10 being worst pain imaginable, he rates this pain as 5 on good days and 7 on bad days.  he describes the pain as sore and achy.    Review of patient's allergies indicates:  No Known Allergies      Current Outpatient Medications   Medication Sig Dispense Refill    allopurinoL (ZYLOPRIM) 300 MG tablet Take 1 tablet (300 mg total) by mouth once daily. 90 tablet 3    anastrozole (ARIMIDEX) 1 mg Tab Take 1 mg by mouth once daily.      aspirin (ECOTRIN) 81 MG EC tablet Take 81 mg by mouth once daily.      atorvastatin (LIPITOR) 40 MG tablet Take 1 tablet (40 mg total) by mouth every evening. For cholesterol 90 tablet 3    clotrimazole-betamethasone 1-0.05% (LOTRISONE) cream Apply topically once daily. To feet and toenails 45 g 1    furosemide (LASIX) 40 MG tablet Take 1 tablet (40 mg total) by mouth 2 (two) times a day. 90 tablet 3    HYDROcodone-acetaminophen (NORCO)  mg per tablet Take 1 tablet by mouth.      hydrocodone-acetaminophen 5-325mg (NORCO) 5-325 mg per tablet TK 1 T PO  Q 6 TO 8 H  0    ibuprofen (ADVIL,MOTRIN) 600 MG tablet TAKE 1 TABLET(600 MG) BY MOUTH EVERY 6 HOURS WITH FOOD AS NEEDED FOR PAIN OR INFLAMMATION 90 tablet 2    ketoconazole (NIZORAL) 2 % cream Apply topically once daily. 60 g 1    metFORMIN (GLUCOPHAGE-XR) 500 MG ER 24hr tablet Take 1 tablet (500 mg total) by mouth 2 (two) times daily with meals. 180 tablet 3    naproxen (NAPROSYN)  500 MG tablet Take 500 mg by mouth 2 (two) times daily as needed.      olmesartan (BENICAR) 40 MG tablet Take 1 tablet (40 mg total) by mouth once daily. Blood pressure 90 tablet 3    pregabalin (LYRICA) 100 MG capsule Take 1 capsule (100 mg total) by mouth 3 (three) times daily. 90 capsule 3    semaglutide (OZEMPIC) 1 mg/dose (4 mg/3 mL) Inject 1 mg into the skin every 7 days. 4 pen 2    sildenafiL (VIAGRA) 100 MG tablet Take 1 tablet (100 mg total) by mouth daily as needed for Erectile Dysfunction (take on an empty stomach 1 hour prior to sex). 10 tablet 11    spironolactone (ALDACTONE) 25 MG tablet Take 0.5 tablets (12.5 mg total) by mouth once daily. 15 tablet 3    tamsulosin (FLOMAX) 0.4 mg Cap Take 1 capsule (0.4 mg total) by mouth every morning. 90 capsule 3     No current facility-administered medications for this visit.       Past Medical History:   Diagnosis Date    Arthritis     Cataract     Chronic back pain     Diabetes mellitus     Edema of both lower extremities 5/24/2021    Gout attack     Hyperlipidemia     Hypertension     Prostate cancer 9/20/2023    Sleep apnea     Swelling of lower limb 10/4/2021    Trouble in sleeping     Type 2 diabetes mellitus     Wound infection 6/3/2022       Past Surgical History:   Procedure Laterality Date    BACK SURGERY      COLONOSCOPY N/A 11/20/2023    Procedure: COLONOSCOPY;  Surgeon: Paulina Orosco MD;  Location: [address]);  Service: Endoscopy;  Laterality: N/A;  Referred by:  RUFUS Zaabla NP   Meds: Ozempic - inst to hold per protocol  Prep: suprep  Route instructions sent: portal  Pt states he is able to walk without SOB.     11/13-precall complete-pt verbalized understanding of holding Ozempic-MS    lumbar fusion      X 2       Vital Signs (Most Recent)  There were no vitals filed for this visit.        Review of Systems:  ROS:  Constitutional: no fever or chills, positive structure sleep apnea  Eyes: no visual changes  ENT: no nasal congestion  or sore throat  Respiratory: no cough or shortness of breath  Cardiovascular: no chest pain or palpitations, positive for right bundle-branch block chronic venous insufficiency  Gastrointestinal: no nausea or vomiting, tolerating diet  Genitourinary: no hematuria or dysuria, positive BPH  Integument/Breast: no rash or pruritis  Hematologic/Lymphatic: no easy bruising or lymphadenopathy, positive lymphedema bilateral lower extremities  Musculoskeletal: no arthralgias or myalgias, positive history of gout, chronic low back pain venous stasis ulcer lower extremity  Neurological: no seizures or tremors  Behavioral/Psych: no auditory or visual hallucinations, positive for narcotics dependency  Endocrine: no heat or cold intolerance, positive diabetes type 2, morbid obesity, hypogonadism,                OBJECTIVE:     PHYSICAL EXAM:     , General Appearance: Well nourished, well developed, in no acute distress.  Neurological: Mood & affect are normal.  ght  Knee Exam:  Knee Range of Motion:0-115 degrees flexion   Effusion:none  Condition of skin:intact  Location of tenderness:Lateral joint line   Strength:limited by pain and 5 of 5  Stability:  Lachman: stable, LCL: stable, MCL: stable, PCL: stable, and posteromedial (dial): stable  Varus /Valgus stress:   Moderate valgus  Anni:   negative/negative     left  Knee Exam:  Knee Range of Motion:0-110 degrees flexion   Effusion:none  Condition of skin:intact  Location of tenderness:Medial joint line   Strength:limited by pain and 5 of 5  Stability:  Lachman: stable, LCL: stable, MCL: stable, PCL: stable, and posteromedial (dial): stable  Varus /Valgus stress:  normal  Anni:   negative/negative        Hip Examination:  normal    RADIOGRAPHS:  X-rays from previous visit reviewed by me today demonstrate moderate arthritic changes throughout both knees right more so than left with complete loss of medial joint space marked sclerotic changes early osteophytic spurring most  notable in the medial compartments of bilateral knees 1-2 mm of joint space remaining in the left with early osteophytic spurring no evidence of fracture dislocation or other bony abnormalities    ASSESSMENT/PLAN:       ICD-10-CM ICD-9-CM   1. Primary osteoarthritis of both knees  M17.0 715.16   2. Type 2 diabetes mellitus without complication, without long-term current use of insulin  E11.9 250.00       Plan: We discussed with the patient at length all the different treatment options available for  the knee including anti-inflammatories, acetaminophen, rest, ice, knee strengthening exercise, occasional cortisone injections for temporary relief, Viscosupplimentation injections, arthroscopic debridement osteotomy, and finally knee arthroplasty.   Proceed with repeat cortisone injection bilateral knees     The risks, benefits, pros, cons, and potential side effects of the procedure were discussed with the patient in detail all questions were answered.  The patient is comfortable and willing to proceed with the procedure. Verbal consent was obtained and the proper joint was identified by the patient and provider        The injection site was identified and the skin was prepared with a betadine solution. The   bilateral  knee was injected with 1 ml of Celestone and 5 ml Lidocaine under sterile technique. Jono Gonzalez tolerated the procedure well, he was advised to rest the knee today, ice and elevation. he did receive immediate relief of the pain in and about his knee he was told this would be short lived and is secondary to the lidocaine. he may have an increase in his discomfort tonight followed by steady improvement over the next several days. I may take 1-3 weeks following the injection to get the full benefit of the medication.  I will see him back in 4-6 months. Sooner if he has any problems or concerns.    Jono Gonzalez was advised to monitor his blood sugars closely over the next several days. The steroid may  cause a rise in them. If his glucose levels rise to a point he is uncomfortable or he is unable to control them is is to contact his PCP or go immediately to the emergency department.

## 2023-12-25 ENCOUNTER — PATIENT MESSAGE (OUTPATIENT)
Dept: ADMINISTRATIVE | Facility: OTHER | Age: 68
End: 2023-12-25
Payer: COMMERCIAL

## 2024-01-26 ENCOUNTER — OFFICE VISIT (OUTPATIENT)
Dept: URGENT CARE | Facility: CLINIC | Age: 69
End: 2024-01-26
Payer: COMMERCIAL

## 2024-01-26 VITALS
HEIGHT: 67 IN | DIASTOLIC BLOOD PRESSURE: 84 MMHG | TEMPERATURE: 98 F | WEIGHT: 289 LBS | RESPIRATION RATE: 16 BRPM | BODY MASS INDEX: 45.36 KG/M2 | SYSTOLIC BLOOD PRESSURE: 131 MMHG | HEART RATE: 92 BPM | OXYGEN SATURATION: 96 %

## 2024-01-26 DIAGNOSIS — J20.8 ACUTE BACTERIAL BRONCHITIS: ICD-10-CM

## 2024-01-26 DIAGNOSIS — R05.2 SUBACUTE COUGH: Primary | ICD-10-CM

## 2024-01-26 DIAGNOSIS — B96.89 ACUTE BACTERIAL BRONCHITIS: ICD-10-CM

## 2024-01-26 LAB
CTP QC/QA: YES
SARS-COV-2 AG RESP QL IA.RAPID: NEGATIVE

## 2024-01-26 PROCEDURE — 99213 OFFICE O/P EST LOW 20 MIN: CPT | Mod: S$GLB,,, | Performed by: FAMILY MEDICINE

## 2024-01-26 PROCEDURE — 87811 SARS-COV-2 COVID19 W/OPTIC: CPT | Mod: QW,S$GLB,, | Performed by: FAMILY MEDICINE

## 2024-01-26 RX ORDER — PROMETHAZINE HYDROCHLORIDE AND DEXTROMETHORPHAN HYDROBROMIDE 6.25; 15 MG/5ML; MG/5ML
5 SYRUP ORAL EVERY 4 HOURS PRN
Qty: 118 ML | Refills: 0 | Status: SHIPPED | OUTPATIENT
Start: 2024-01-26 | End: 2024-05-01

## 2024-01-26 RX ORDER — AZITHROMYCIN 250 MG/1
TABLET, FILM COATED ORAL
Qty: 6 TABLET | Refills: 0 | Status: SHIPPED | OUTPATIENT
Start: 2024-01-26 | End: 2024-01-31

## 2024-01-26 RX ORDER — LEVOCETIRIZINE DIHYDROCHLORIDE 5 MG/1
5 TABLET, FILM COATED ORAL NIGHTLY
Qty: 10 TABLET | Refills: 0 | Status: SHIPPED | OUTPATIENT
Start: 2024-01-26 | End: 2024-02-05

## 2024-01-26 NOTE — PROGRESS NOTES
"Subjective:      Patient ID: Jono Gonzalez is a 69 y.o. male.    Vitals:  height is 5' 7" (1.702 m) and weight is 131.1 kg (289 lb). His oral temperature is 97.9 °F (36.6 °C). His blood pressure is 131/84 and his pulse is 92. His respiration is 16 and oxygen saturation is 96%.     Chief Complaint: Cough    Patient reports a dry cough that started last week.Patient took his wife's prescription cough medicine.    Cough  This is a new problem. The current episode started 1 to 4 weeks ago. The problem has been gradually worsening. The problem occurs every few minutes. The cough is Non-productive. Associated symptoms include headaches, postnasal drip, rhinorrhea and a sore throat (off and on). Fever: off and on.He has tried prescription cough suppressant (wife's) for the symptoms. There is no history of asthma or bronchitis.       Constitution: Fever: off and on.   HENT:  Positive for postnasal drip and sore throat (off and on). Negative for sinus pain and sinus pressure.    Respiratory:  Positive for cough.    Neurological:  Positive for headaches.      Objective:     Vitals:    01/26/24 1431   BP: 131/84   BP Location: Left arm   Patient Position: Sitting   BP Method: Medium (Automatic)   Pulse: 92   Resp: 16   Temp: 97.9 °F (36.6 °C)   TempSrc: Oral   SpO2: 96%   Weight: 131.1 kg (289 lb)   Height: 5' 7" (1.702 m)      Physical Exam   Constitutional: He is oriented to person, place, and time. He appears well-developed. He is cooperative.  Non-toxic appearance. He does not appear ill. No distress.   HENT:   Head: Normocephalic and atraumatic.   Ears:   Right Ear: Hearing, tympanic membrane, external ear and ear canal normal.   Left Ear: Hearing, tympanic membrane, external ear and ear canal normal.   Nose: Congestion present. No mucosal edema, rhinorrhea or nasal deformity. No epistaxis. Right sinus exhibits no maxillary sinus tenderness and no frontal sinus tenderness. Left sinus exhibits no maxillary sinus tenderness " and no frontal sinus tenderness.   Mouth/Throat: Uvula is midline, oropharynx is clear and moist and mucous membranes are normal. No trismus in the jaw. Normal dentition. No uvula swelling. No oropharyngeal exudate, posterior oropharyngeal edema or posterior oropharyngeal erythema.   Eyes: Conjunctivae and lids are normal. No scleral icterus.   Neck: Trachea normal and phonation normal. Neck supple. No edema present. No erythema present. No neck rigidity present.   Cardiovascular: Normal rate, regular rhythm, normal heart sounds and normal pulses.   Pulmonary/Chest: Effort normal and breath sounds normal. No respiratory distress. He has no decreased breath sounds. He has no rhonchi.   Abdominal: Normal appearance.   Musculoskeletal: Normal range of motion.         General: Normal range of motion.   Neurological: He is alert and oriented to person, place, and time. He exhibits normal muscle tone. Coordination normal.   Skin: Skin is warm, intact and not diaphoretic.   Psychiatric: His speech is normal and behavior is normal. Judgment and thought content normal.   Nursing note and vitals reviewed.    Results for orders placed or performed in visit on 01/26/24   SARS Coronavirus 2 Antigen, POCT Manual Read   Result Value Ref Range    SARS Coronavirus 2 Antigen Negative Negative     Acceptable Yes       Assessment:     1. Subacute cough    2. Acute bacterial bronchitis        Plan:       Subacute cough  -     SARS Coronavirus 2 Antigen, POCT Manual Read    2. Acute bacterial bronchitis  -     azithromycin (Z-MARCO A) 250 MG tablet; Take 2 tablets by mouth on day 1; Take 1 tablet by mouth on days 2-5  Dispense: 6 tablet; Refill: 0  -     promethazine-dextromethorphan (PROMETHAZINE-DM) 6.25-15 mg/5 mL Syrp; Take 5 mLs by mouth every 4 (four) hours as needed (cough). This medication can make you drowsy  Dispense: 118 mL; Refill: 0  -     levocetirizine (XYZAL) 5 MG tablet; Take 1 tablet (5 mg total) by mouth  every evening. May substitute for Zyrtec 10 mg daily if Xyzal is not affordable. for 10 days  Dispense: 10 tablet; Refill: 0    Patient Instructions   Follow up with primary care provider in 7 days.  Seek immediate care in the emergency room in the event of severe chest pain, respiratory distress, fever unresponsive to antipyretic, dehydration, loss of consciousness, seizure.

## 2024-01-26 NOTE — PATIENT INSTRUCTIONS
Follow up with primary care provider in 7 days.  Seek immediate care in the emergency room in the event of severe chest pain, respiratory distress, fever unresponsive to antipyretic, dehydration, loss of consciousness, seizure.

## 2024-01-29 RX ORDER — SEMAGLUTIDE 1.34 MG/ML
1 INJECTION, SOLUTION SUBCUTANEOUS
Qty: 4 EACH | Refills: 2 | Status: SHIPPED | OUTPATIENT
Start: 2024-01-29 | End: 2025-01-28

## 2024-01-29 NOTE — TELEPHONE ENCOUNTER
Care Due:                  Date            Visit Type   Department     Provider  --------------------------------------------------------------------------------                                EP -                              PRIMARY      [facility] PRIMARY  Last Visit: 06-      CARE ([facility])   CARE           ANGELLA BASHIR                                           [facility] PRIMARY  Next Visit: 04-      [facility]          CARE           Juli Puckett                                                            Last  Test          Frequency    Reason                     Performed    Due Date  --------------------------------------------------------------------------------    CBC.........  12 months..  ibuprofen................  06- 05-    HBA1C.......  6 months...  semaglutide..............  09- 03-    [facility] Embedded Care Due Messages. Reference number: [id number].   1/29/2024 5:08:14 AM CST

## 2024-02-01 DIAGNOSIS — J20.8 ACUTE BACTERIAL BRONCHITIS: ICD-10-CM

## 2024-02-01 DIAGNOSIS — B96.89 ACUTE BACTERIAL BRONCHITIS: ICD-10-CM

## 2024-02-01 RX ORDER — LEVOCETIRIZINE DIHYDROCHLORIDE 5 MG/1
5 TABLET, FILM COATED ORAL NIGHTLY
Qty: 10 TABLET | Refills: 0 | Status: CANCELLED | OUTPATIENT
Start: 2024-02-01 | End: 2024-02-11

## 2024-02-13 ENCOUNTER — PATIENT MESSAGE (OUTPATIENT)
Dept: ADMINISTRATIVE | Facility: OTHER | Age: 69
End: 2024-02-13
Payer: COMMERCIAL

## 2024-03-08 ENCOUNTER — PATIENT OUTREACH (OUTPATIENT)
Dept: ADMINISTRATIVE | Facility: HOSPITAL | Age: 69
End: 2024-03-08
Payer: COMMERCIAL

## 2024-03-08 DIAGNOSIS — E11.9 TYPE 2 DIABETES MELLITUS WITHOUT COMPLICATION, WITHOUT LONG-TERM CURRENT USE OF INSULIN: Primary | ICD-10-CM

## 2024-03-08 NOTE — PROGRESS NOTES
Health Maintenance Due   Topic Date Due    RSV Vaccine (Age 60+ and Pregnant patients) (1 - 1-dose 60+ series) Never done    Hemoglobin A1c  03/20/2023    Eye Exam  07/27/2023    COVID-19 Vaccine (4 - 2023-24 season) 09/01/2023      LAB SCHEDULED 04/2024, Eye exam scheduled.

## 2024-03-18 DIAGNOSIS — E78.2 MIXED HYPERLIPIDEMIA: ICD-10-CM

## 2024-03-18 DIAGNOSIS — E11.9 DIABETES MELLITUS WITHOUT COMPLICATION: ICD-10-CM

## 2024-03-18 NOTE — TELEPHONE ENCOUNTER
No care due was identified.  [facility] Embedded Care Due Messages. Reference number: [id number].   3/18/2024 6:34:40 PM CDT

## 2024-03-20 RX ORDER — ATORVASTATIN CALCIUM 40 MG/1
TABLET, FILM COATED ORAL
Qty: 90 TABLET | Refills: 1 | Status: SHIPPED | OUTPATIENT
Start: 2024-03-20

## 2024-03-20 RX ORDER — METFORMIN HYDROCHLORIDE 500 MG/1
500 TABLET, EXTENDED RELEASE ORAL 2 TIMES DAILY WITH MEALS
Qty: 180 TABLET | Refills: 1 | Status: SHIPPED | OUTPATIENT
Start: 2024-03-20

## 2024-04-01 ENCOUNTER — OFFICE VISIT (OUTPATIENT)
Dept: PRIMARY CARE CLINIC | Facility: CLINIC | Age: 69
End: 2024-04-01
Payer: COMMERCIAL

## 2024-04-01 ENCOUNTER — LAB VISIT (OUTPATIENT)
Dept: PRIMARY CARE CLINIC | Facility: CLINIC | Age: 69
End: 2024-04-01
Payer: COMMERCIAL

## 2024-04-01 VITALS
OXYGEN SATURATION: 90 % | SYSTOLIC BLOOD PRESSURE: 139 MMHG | BODY MASS INDEX: 44.28 KG/M2 | HEART RATE: 86 BPM | TEMPERATURE: 98 F | DIASTOLIC BLOOD PRESSURE: 59 MMHG | WEIGHT: 282.75 LBS

## 2024-04-01 DIAGNOSIS — E11.42 TYPE 2 DIABETES MELLITUS WITH DIABETIC POLYNEUROPATHY, UNSPECIFIED WHETHER LONG TERM INSULIN USE: ICD-10-CM

## 2024-04-01 DIAGNOSIS — E11.9 TYPE 2 DIABETES MELLITUS WITHOUT COMPLICATION, WITHOUT LONG-TERM CURRENT USE OF INSULIN: ICD-10-CM

## 2024-04-01 DIAGNOSIS — Z12.5 PROSTATE CANCER SCREENING: ICD-10-CM

## 2024-04-01 DIAGNOSIS — F11.20 NARCOTIC DEPENDENCE: Primary | ICD-10-CM

## 2024-04-01 DIAGNOSIS — R79.89 ABNORMAL CBC: ICD-10-CM

## 2024-04-01 DIAGNOSIS — I10 PRIMARY HYPERTENSION: ICD-10-CM

## 2024-04-01 PROBLEM — L97.929 VENOUS STASIS ULCER OF LEFT LOWER EXTREMITY: Status: RESOLVED | Noted: 2022-05-17 | Resolved: 2024-04-01

## 2024-04-01 PROBLEM — I83.029 VENOUS STASIS ULCER OF LEFT LOWER EXTREMITY: Status: RESOLVED | Noted: 2022-05-17 | Resolved: 2024-04-01

## 2024-04-01 PROBLEM — C61 PROSTATE CANCER: Status: RESOLVED | Noted: 2023-09-20 | Resolved: 2024-04-01

## 2024-04-01 LAB
ANION GAP SERPL CALC-SCNC: 8 MMOL/L (ref 8–16)
BASOPHILS # BLD AUTO: 0.04 K/UL (ref 0–0.2)
BASOPHILS NFR BLD: 0.5 % (ref 0–1.9)
BUN SERPL-MCNC: 20 MG/DL (ref 8–23)
CALCIUM SERPL-MCNC: 9.7 MG/DL (ref 8.7–10.5)
CHLORIDE SERPL-SCNC: 110 MMOL/L (ref 95–110)
CO2 SERPL-SCNC: 25 MMOL/L (ref 23–29)
COMPLEXED PSA SERPL-MCNC: 0.76 NG/ML (ref 0–4)
CREAT SERPL-MCNC: 1 MG/DL (ref 0.5–1.4)
DIFFERENTIAL METHOD BLD: ABNORMAL
EOSINOPHIL # BLD AUTO: 0.1 K/UL (ref 0–0.5)
EOSINOPHIL NFR BLD: 1.3 % (ref 0–8)
ERYTHROCYTE [DISTWIDTH] IN BLOOD BY AUTOMATED COUNT: 14.5 % (ref 11.5–14.5)
EST. GFR  (NO RACE VARIABLE): >60 ML/MIN/1.73 M^2
ESTIMATED AVG GLUCOSE: 111 MG/DL (ref 68–131)
GLUCOSE SERPL-MCNC: 79 MG/DL (ref 70–110)
HBA1C MFR BLD: 5.5 % (ref 4–5.6)
HCT VFR BLD AUTO: 43.3 % (ref 40–54)
HGB BLD-MCNC: 13.8 G/DL (ref 14–18)
IMM GRANULOCYTES # BLD AUTO: 0.02 K/UL (ref 0–0.04)
IMM GRANULOCYTES NFR BLD AUTO: 0.2 % (ref 0–0.5)
LYMPHOCYTES # BLD AUTO: 2.3 K/UL (ref 1–4.8)
LYMPHOCYTES NFR BLD: 27 % (ref 18–48)
MCH RBC QN AUTO: 28.5 PG (ref 27–31)
MCHC RBC AUTO-ENTMCNC: 31.9 G/DL (ref 32–36)
MCV RBC AUTO: 90 FL (ref 82–98)
MONOCYTES # BLD AUTO: 0.7 K/UL (ref 0.3–1)
MONOCYTES NFR BLD: 8.5 % (ref 4–15)
NEUTROPHILS # BLD AUTO: 5.3 K/UL (ref 1.8–7.7)
NEUTROPHILS NFR BLD: 62.5 % (ref 38–73)
NRBC BLD-RTO: 0 /100 WBC
PLATELET # BLD AUTO: 242 K/UL (ref 150–450)
PMV BLD AUTO: 9.7 FL (ref 9.2–12.9)
POTASSIUM SERPL-SCNC: 4.3 MMOL/L (ref 3.5–5.1)
RBC # BLD AUTO: 4.84 M/UL (ref 4.6–6.2)
SODIUM SERPL-SCNC: 143 MMOL/L (ref 136–145)
WBC # BLD AUTO: 8.54 K/UL (ref 3.9–12.7)

## 2024-04-01 PROCEDURE — 84153 ASSAY OF PSA TOTAL: CPT | Performed by: STUDENT IN AN ORGANIZED HEALTH CARE EDUCATION/TRAINING PROGRAM

## 2024-04-01 PROCEDURE — 99999 PR PBB SHADOW E&M-EST. PATIENT-LVL IV: CPT | Mod: PBBFAC,,, | Performed by: STUDENT IN AN ORGANIZED HEALTH CARE EDUCATION/TRAINING PROGRAM

## 2024-04-01 PROCEDURE — 83036 HEMOGLOBIN GLYCOSYLATED A1C: CPT | Performed by: FAMILY MEDICINE

## 2024-04-01 PROCEDURE — 99214 OFFICE O/P EST MOD 30 MIN: CPT | Mod: S$GLB,,, | Performed by: STUDENT IN AN ORGANIZED HEALTH CARE EDUCATION/TRAINING PROGRAM

## 2024-04-01 PROCEDURE — 1126F AMNT PAIN NOTED NONE PRSNT: CPT | Mod: CPTII,S$GLB,, | Performed by: STUDENT IN AN ORGANIZED HEALTH CARE EDUCATION/TRAINING PROGRAM

## 2024-04-01 PROCEDURE — 3044F HG A1C LEVEL LT 7.0%: CPT | Mod: CPTII,S$GLB,, | Performed by: STUDENT IN AN ORGANIZED HEALTH CARE EDUCATION/TRAINING PROGRAM

## 2024-04-01 PROCEDURE — 1159F MED LIST DOCD IN RCRD: CPT | Mod: CPTII,S$GLB,, | Performed by: STUDENT IN AN ORGANIZED HEALTH CARE EDUCATION/TRAINING PROGRAM

## 2024-04-01 PROCEDURE — 4010F ACE/ARB THERAPY RXD/TAKEN: CPT | Mod: CPTII,S$GLB,, | Performed by: STUDENT IN AN ORGANIZED HEALTH CARE EDUCATION/TRAINING PROGRAM

## 2024-04-01 PROCEDURE — 80048 BASIC METABOLIC PNL TOTAL CA: CPT | Performed by: STUDENT IN AN ORGANIZED HEALTH CARE EDUCATION/TRAINING PROGRAM

## 2024-04-01 PROCEDURE — 3078F DIAST BP <80 MM HG: CPT | Mod: CPTII,S$GLB,, | Performed by: STUDENT IN AN ORGANIZED HEALTH CARE EDUCATION/TRAINING PROGRAM

## 2024-04-01 PROCEDURE — 85025 COMPLETE CBC W/AUTO DIFF WBC: CPT | Performed by: STUDENT IN AN ORGANIZED HEALTH CARE EDUCATION/TRAINING PROGRAM

## 2024-04-01 PROCEDURE — 3008F BODY MASS INDEX DOCD: CPT | Mod: CPTII,S$GLB,, | Performed by: STUDENT IN AN ORGANIZED HEALTH CARE EDUCATION/TRAINING PROGRAM

## 2024-04-01 PROCEDURE — 3075F SYST BP GE 130 - 139MM HG: CPT | Mod: CPTII,S$GLB,, | Performed by: STUDENT IN AN ORGANIZED HEALTH CARE EDUCATION/TRAINING PROGRAM

## 2024-04-01 NOTE — PROGRESS NOTES
04/01/2024    Jono Gonzalez  [id number]    Chief Complaint   Patient presents with    Annual Exam       HPI    This pt is new to me and presents for routine care. Chronic conditions: gout, HTN, obesity,  and HLD. No changes in last check up. Still on ozmepic for weight loss and it's working.     HTN: follows with digital medicine. Olmesartan 40 mg furosemide 40 mg     TYPE II DIABETES: ozempic and metformin  follows with digital medicine. Has lost weight since last visit. Stays active with gardening, but limited 2/2 right knee OA. Has to lose weight before surgery. Describes diet as pretty good. Is currently eating one meal per day. 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     Gout: no issues. allopurinol      Negative 10 point ROS outside of HPI    Social History     Socioeconomic History    Marital status:    Tobacco Use    Smoking status: Never    Smokeless tobacco: Never   Substance and Sexual Activity    Alcohol use: Yes     Comment: occ    Drug use: No    Sexual activity: Yes     Partners: Female     Social Determinants of Health     Financial Resource Strain: Low Risk  (9/20/2023)    Overall Financial Resource Strain (CARDIA)     Difficulty of Paying Living Expenses: Not hard at all   Food Insecurity: No Food Insecurity (9/20/2023)    Hunger Vital Sign     Worried About Running Out of Food in the Last Year: Never true     Ran Out of Food in the Last Year: Never true   Transportation Needs: No Transportation Needs (9/20/2023)    PRAPARE - Transportation     Lack of Transportation (Medical): No     Lack of Transportation (Non-Medical): No   Physical Activity: Insufficiently Active (9/20/2023)    Exercise Vital Sign     Days of Exercise per Week: 2 days     Minutes of Exercise per Session: 60 min   Stress: Stress Concern Present (9/20/2023)    [place] [employer] - Occupational Stress Questionnaire     Feeling of Stress : To some extent   Social Connections: Moderately  Isolated (9/20/2023)    Social Connection and Isolation Panel [NHANES]     Frequency of Communication with Friends and Family: More than three times a week     Frequency of Social Gatherings with Friends and Family: More than three times a week     Attends [employer]ism Services: Never     Active Member of Clubs or Organizations: No     Attends Club or Organization Meetings: Never     Marital Status:    Housing Stability: Low Risk  (9/20/2023)    Housing Stability Vital Sign     Unable to Pay for Housing in the Last Year: No     Number of Places Lived in the Last Year: 1     Unstable Housing in the Last Year: No           Current Outpatient Medications:     allopurinoL (ZYLOPRIM) 300 MG tablet, Take 1 tablet (300 mg total) by mouth once daily., Disp: 90 tablet, Rfl: 3    anastrozole (ARIMIDEX) 1 mg Tab, Take 1 mg by mouth once daily., Disp: , Rfl:     aspirin (ECOTRIN) 81 MG EC tablet, Take 81 mg by mouth once daily., Disp: , Rfl:     atorvastatin (LIPITOR) 40 MG tablet, TAKE 1 TABLET(40 MG) BY MOUTH EVERY EVENING FOR CHOLESTEROL, Disp: 90 tablet, Rfl: 1    clotrimazole-betamethasone 1-0.05% (LOTRISONE) cream, Apply topically once daily. To feet and toenails, Disp: 45 g, Rfl: 1    furosemide (LASIX) 40 MG tablet, Take 1 tablet (40 mg total) by mouth 2 (two) times a day., Disp: 90 tablet, Rfl: 3    HYDROcodone-acetaminophen (NORCO)  mg per tablet, Take 1 tablet by mouth., Disp: , Rfl:     hydrocodone-acetaminophen 5-325mg (NORCO) 5-325 mg per tablet, TK 1 T PO  Q 6 TO 8 H, Disp: , Rfl: 0    metFORMIN (GLUCOPHAGE-XR) 500 MG ER 24hr tablet, TAKE 1 TABLET(500 MG) BY MOUTH TWICE DAILY WITH MEALS, Disp: 180 tablet, Rfl: 1    olmesartan (BENICAR) 40 MG tablet, Take 1 tablet (40 mg total) by mouth once daily. Blood pressure, Disp: 90 tablet, Rfl: 3    pregabalin (LYRICA) 100 MG capsule, Take 1 capsule (100 mg total) by mouth 3 (three) times daily., Disp: 90 capsule, Rfl: 3    promethazine-dextromethorphan  (PROMETHAZINE-DM) 6.25-15 mg/5 mL Syrp, Take 5 mLs by mouth every 4 (four) hours as needed (cough). This medication can make you drowsy, Disp: 118 mL, Rfl: 0    semaglutide (OZEMPIC) 1 mg/dose (4 mg/3 mL), Inject 1 mg into the skin every 7 days., Disp: 4 each, Rfl: 2    sildenafiL (VIAGRA) 100 MG tablet, Take 1 tablet (100 mg total) by mouth daily as needed for Erectile Dysfunction (take on an empty stomach 1 hour prior to sex)., Disp: 10 tablet, Rfl: 11    tamsulosin (FLOMAX) 0.4 mg Cap, Take 1 capsule (0.4 mg total) by mouth every morning., Disp: 90 capsule, Rfl: 3    ibuprofen (ADVIL,MOTRIN) 600 MG tablet, TAKE 1 TABLET(600 MG) BY MOUTH EVERY 6 HOURS WITH FOOD AS NEEDED FOR PAIN OR INFLAMMATION (Patient not taking: Reported on 4/1/2024), Disp: 90 tablet, Rfl: 2    ketoconazole (NIZORAL) 2 % cream, Apply topically once daily. (Patient not taking: Reported on 4/1/2024), Disp: 60 g, Rfl: 1    levocetirizine (XYZAL) 5 MG tablet, Take 1 tablet (5 mg total) by mouth every evening. May substitute for Zyrtec 10 mg daily if Xyzal is not affordable. for 10 days, Disp: 10 tablet, Rfl: 0    naproxen (NAPROSYN) 500 MG tablet, Take 500 mg by mouth 2 (two) times daily as needed., Disp: , Rfl:       Physical Exam  Vitals:    04/01/24 1534   BP: (!) 139/59   Pulse: 86   Temp: 97.9 °F (36.6 °C)       Physical Exam      Gen: well appearing, NAD  Resp: non labored breathing, no crackles, no wheezes, CTAB  CV: RRR no murmur, gallops, rubs, no LE edema  Abd: soft nontender BS present no organomegaly    1. Narcotic dependence  Stable; management with outside provider    2. Type 2 diabetes mellitus with diabetic polyneuropathy, unspecified whether long term insulin use  Well controlled continue current regimen    3. Primary hypertension  Well controlled continue current regimen  - Basic Metabolic Panel; Future    4. Prostate cancer screening  - PSA, SCREENING; Future    5. Abnormal CBC  - CBC Auto Differential; Future        RTC in 3-6  months    Juli Puckett MD  Family Medicine

## 2024-04-03 ENCOUNTER — PATIENT MESSAGE (OUTPATIENT)
Dept: ADMINISTRATIVE | Facility: HOSPITAL | Age: 69
End: 2024-04-03
Payer: COMMERCIAL

## 2024-04-04 ENCOUNTER — TELEPHONE (OUTPATIENT)
Dept: PRIMARY CARE CLINIC | Facility: CLINIC | Age: 69
End: 2024-04-04
Payer: COMMERCIAL

## 2024-04-04 NOTE — TELEPHONE ENCOUNTER
----- Message from Juli Puckett MD sent at 4/2/2024  8:08 AM CDT -----  Please let pt know that his labs are normal including his A1c, which is awesome.

## 2024-05-01 ENCOUNTER — OFFICE VISIT (OUTPATIENT)
Dept: URGENT CARE | Facility: CLINIC | Age: 69
End: 2024-05-01
Payer: COMMERCIAL

## 2024-05-01 VITALS
DIASTOLIC BLOOD PRESSURE: 71 MMHG | HEIGHT: 67 IN | SYSTOLIC BLOOD PRESSURE: 110 MMHG | TEMPERATURE: 98 F | BODY MASS INDEX: 44.26 KG/M2 | OXYGEN SATURATION: 93 % | WEIGHT: 282 LBS | RESPIRATION RATE: 20 BRPM | HEART RATE: 92 BPM

## 2024-05-01 DIAGNOSIS — R05.9 COUGH, UNSPECIFIED TYPE: Primary | ICD-10-CM

## 2024-05-01 LAB
CTP QC/QA: YES
CTP QC/QA: YES
POC MOLECULAR INFLUENZA A AGN: NEGATIVE
POC MOLECULAR INFLUENZA B AGN: NEGATIVE
SARS-COV-2 AG RESP QL IA.RAPID: NEGATIVE

## 2024-05-01 PROCEDURE — 99213 OFFICE O/P EST LOW 20 MIN: CPT | Mod: S$GLB,,, | Performed by: NURSE PRACTITIONER

## 2024-05-01 PROCEDURE — 87811 SARS-COV-2 COVID19 W/OPTIC: CPT | Mod: QW,S$GLB,, | Performed by: NURSE PRACTITIONER

## 2024-05-01 PROCEDURE — 87502 INFLUENZA DNA AMP PROBE: CPT | Mod: QW,S$GLB,, | Performed by: NURSE PRACTITIONER

## 2024-05-01 RX ORDER — MONTELUKAST SODIUM 10 MG/1
10 TABLET ORAL NIGHTLY
Qty: 30 TABLET | Refills: 0 | Status: SHIPPED | OUTPATIENT
Start: 2024-05-01 | End: 2024-05-31

## 2024-05-01 RX ORDER — PROMETHAZINE HYDROCHLORIDE AND DEXTROMETHORPHAN HYDROBROMIDE 6.25; 15 MG/5ML; MG/5ML
5 SYRUP ORAL EVERY 4 HOURS PRN
Qty: 120 ML | Refills: 0 | Status: SHIPPED | OUTPATIENT
Start: 2024-05-01 | End: 2024-05-11

## 2024-05-01 NOTE — PROGRESS NOTES
"Subjective:      Patient ID: Jono Gonzalez is a 69 y.o. male.    Vitals:  height is 5' 7" (1.702 m) and weight is 127.9 kg (282 lb). His oral temperature is 98.4 °F (36.9 °C). His blood pressure is 110/71 and his pulse is 92. His respiration is 20 and oxygen saturation is 93% (abnormal).     Chief Complaint: Cough    Pt presents today with a cough with no productive sputum. Pt is also experiencing body aches, headaches,and congestion. Pt states symptoms started Monday and he has not taken any medication for the symptoms.    Cough  This is a new problem. The current episode started in the past 7 days. The problem has been gradually worsening. The problem occurs every few minutes. The cough is Non-productive. Associated symptoms include chills, a fever, headaches, nasal congestion, postnasal drip, sweats and wheezing. Pertinent negatives include no chest pain, ear congestion, ear pain, heartburn, hemoptysis, myalgias, rash, rhinorrhea, sore throat, shortness of breath or weight loss. The symptoms are aggravated by cold air and pollens. He has tried nothing for the symptoms. The treatment provided no relief. There is no history of asthma, bronchiectasis, bronchitis, COPD, emphysema, environmental allergies or pneumonia.       Constitution: Positive for chills and fever.   HENT:  Positive for postnasal drip. Negative for ear pain and sore throat.    Cardiovascular:  Negative for chest pain.   Respiratory:  Positive for cough and wheezing. Negative for bloody sputum and shortness of breath.    Gastrointestinal:  Negative for heartburn.   Musculoskeletal:  Negative for muscle ache.   Skin:  Negative for rash.   Allergic/Immunologic: Negative for environmental allergies.   Neurological:  Positive for headaches.      Objective:     Physical Exam   HENT:   Head: Normocephalic.   Pulmonary/Chest: Effort normal and breath sounds normal.   Abdominal: Normal appearance.   Neurological: He is alert.   Skin: Skin is warm and dry. "   Psychiatric: His behavior is normal.       Assessment:     1. Cough, unspecified type        Plan:       Cough, unspecified type  -     POCT Influenza A/B MOLECULAR  -     SARS Coronavirus 2 Antigen, POCT Manual Read  -     promethazine-dextromethorphan (PROMETHAZINE-DM) 6.25-15 mg/5 mL Syrp; Take 5 mLs by mouth every 4 (four) hours as needed.  Dispense: 120 mL; Refill: 0  -     montelukast (SINGULAIR) 10 mg tablet; Take 1 tablet (10 mg total) by mouth every evening.  Dispense: 30 tablet; Refill: 0    .  Results for orders placed or performed in visit on 05/01/24   POCT Influenza A/B MOLECULAR   Result Value Ref Range    POC Molecular Influenza A Ag Negative Negative    POC Molecular Influenza B Ag Negative Negative     Acceptable Yes    SARS Coronavirus 2 Antigen, POCT Manual Read   Result Value Ref Range    SARS Coronavirus 2 Antigen Negative Negative     Acceptable Yes       Patient Instructions   Do not smoke and stay away from others who smoke. Nicotine and other chemicals in cigarettes and cigars can cause lung damage and make your cough worse. Ask your healthcare provider for information if you currently smoke and need help to quit. E-cigarettes or smokeless tobacco still contain nicotine.    Drink extra liquids as directed. Liquids will help thin and loosen mucus so you can cough it up. Liquids will also help prevent dehydration. Examples of good liquids to drink include water, fruit juice, and broth.    Do not drink liquids that contain caffeine. Caffeine can increase your risk for dehydration. Ask your healthcare provider how much liquid to drink each day.    Rest as directed. Do not do activities that make your cough worse, such as exercise.    Use a humidifier or vaporizer. Use a cool mist humidifier or a vaporizer to increase air moisture in your home. This may make it easier for you to breathe and help decrease your cough.    Eat 2 to 5 mL of honey 2 times each day.  Honey can help thin mucus and decrease your cough.    Use cough drops or lozenges. These can help decrease throat irritation and your cough.    Contact your healthcare provider if:  You have a fever.  Your cough lasts longer than 4 weeks.  Your symptoms do not improve with treatment.  You have questions or concerns about your condition or care.    Return to the emergency department if:  You have trouble breathing or feel short of breath.  You cough up blood, or you see blood in your mucus.  You faint or feel weak or dizzy.  You have chest pain when you cough or take a deep breath.  You have new wheezing.

## 2024-05-01 NOTE — PATIENT INSTRUCTIONS
Do not smoke and stay away from others who smoke. Nicotine and other chemicals in cigarettes and cigars can cause lung damage and make your cough worse. Ask your healthcare provider for information if you currently smoke and need help to quit. E-cigarettes or smokeless tobacco still contain nicotine.    Drink extra liquids as directed. Liquids will help thin and loosen mucus so you can cough it up. Liquids will also help prevent dehydration. Examples of good liquids to drink include water, fruit juice, and broth.    Do not drink liquids that contain caffeine. Caffeine can increase your risk for dehydration. Ask your healthcare provider how much liquid to drink each day.    Rest as directed. Do not do activities that make your cough worse, such as exercise.    Use a humidifier or vaporizer. Use a cool mist humidifier or a vaporizer to increase air moisture in your home. This may make it easier for you to breathe and help decrease your cough.    Eat 2 to 5 mL of honey 2 times each day. Honey can help thin mucus and decrease your cough.    Use cough drops or lozenges. These can help decrease throat irritation and your cough.    Contact your healthcare provider if:  You have a fever.  Your cough lasts longer than 4 weeks.  Your symptoms do not improve with treatment.  You have questions or concerns about your condition or care.    Return to the emergency department if:  You have trouble breathing or feel short of breath.  You cough up blood, or you see blood in your mucus.  You faint or feel weak or dizzy.  You have chest pain when you cough or take a deep breath.  You have new wheezing.   
Normal

## 2024-05-20 RX ORDER — FUROSEMIDE 40 MG/1
40 TABLET ORAL DAILY
Qty: 90 TABLET | Refills: 3 | Status: SHIPPED | OUTPATIENT
Start: 2024-05-20

## 2024-06-19 ENCOUNTER — NURSE TRIAGE (OUTPATIENT)
Dept: ADMINISTRATIVE | Facility: CLINIC | Age: 69
End: 2024-06-19
Payer: COMMERCIAL

## 2024-06-19 NOTE — TELEPHONE ENCOUNTER
Speaking with spouse, states she ordered his DM supplies via the [facility]. States has not heard anything or received supplies. I can not see any order, advised I will send message to [facility] and to his PCP. Phone number for call back verified. Verb understanding.   Reason for Disposition   [1] Follow-up call from patient regarding patient's clinical status AND [2] information NON-URGENT    Protocols used: PCP Call - No Triage-A-AH

## 2024-07-09 ENCOUNTER — PATIENT MESSAGE (OUTPATIENT)
Dept: ADMINISTRATIVE | Facility: HOSPITAL | Age: 69
End: 2024-07-09
Payer: COMMERCIAL

## 2024-09-02 DIAGNOSIS — M10.9 GOUTY ARTHROPATHY: ICD-10-CM

## 2024-09-02 RX ORDER — ALLOPURINOL 300 MG/1
300 TABLET ORAL DAILY
Qty: 90 TABLET | Refills: 3 | Status: CANCELLED | OUTPATIENT
Start: 2024-09-02

## 2024-09-04 DIAGNOSIS — M10.9 GOUTY ARTHROPATHY: ICD-10-CM

## 2024-09-04 NOTE — TELEPHONE ENCOUNTER
Medication Requested:  allopurinoL (ZYLOPRIM) 300 MG tablet              Last prescribed: 08/24/2023   Quantity: 90 tabs w/ 3 refill     Last provider seen: Dr. Juli Puckett    Last Office Visit date: 04/01/2024    PCP: Jim Linn

## 2024-09-05 ENCOUNTER — PATIENT MESSAGE (OUTPATIENT)
Dept: OTHER | Facility: OTHER | Age: 69
End: 2024-09-05
Payer: COMMERCIAL

## 2024-09-06 RX ORDER — ALLOPURINOL 300 MG/1
300 TABLET ORAL DAILY
Qty: 90 TABLET | Refills: 0 | Status: SHIPPED | OUTPATIENT
Start: 2024-09-06

## 2024-09-10 ENCOUNTER — OFFICE VISIT (OUTPATIENT)
Dept: PRIMARY CARE CLINIC | Facility: CLINIC | Age: 69
End: 2024-09-10
Payer: COMMERCIAL

## 2024-09-10 VITALS
HEART RATE: 88 BPM | HEIGHT: 67 IN | OXYGEN SATURATION: 98 % | DIASTOLIC BLOOD PRESSURE: 66 MMHG | BODY MASS INDEX: 44.36 KG/M2 | WEIGHT: 282.63 LBS | SYSTOLIC BLOOD PRESSURE: 110 MMHG

## 2024-09-10 DIAGNOSIS — E11.9 TYPE 2 DIABETES MELLITUS WITHOUT COMPLICATION, WITHOUT LONG-TERM CURRENT USE OF INSULIN: Primary | ICD-10-CM

## 2024-09-10 DIAGNOSIS — E78.2 MIXED HYPERLIPIDEMIA: ICD-10-CM

## 2024-09-10 DIAGNOSIS — I10 PRIMARY HYPERTENSION: ICD-10-CM

## 2024-09-10 DIAGNOSIS — Z23 ENCOUNTER FOR ADMINISTRATION OF VACCINE: ICD-10-CM

## 2024-09-10 DIAGNOSIS — M17.0 PRIMARY OSTEOARTHRITIS OF BOTH KNEES: ICD-10-CM

## 2024-09-10 DIAGNOSIS — E11.9 DIABETES MELLITUS WITHOUT COMPLICATION: ICD-10-CM

## 2024-09-10 DIAGNOSIS — G57.93 NEUROPATHY INVOLVING BOTH LOWER EXTREMITIES: ICD-10-CM

## 2024-09-10 DIAGNOSIS — R35.0 FREQUENCY OF URINATION: ICD-10-CM

## 2024-09-10 PROCEDURE — 20610 DRAIN/INJ JOINT/BURSA W/O US: CPT | Mod: 50,S$GLB,, | Performed by: FAMILY MEDICINE

## 2024-09-10 PROCEDURE — 99999 PR PBB SHADOW E&M-EST. PATIENT-LVL III: CPT | Mod: PBBFAC,,, | Performed by: FAMILY MEDICINE

## 2024-09-10 PROCEDURE — 1159F MED LIST DOCD IN RCRD: CPT | Mod: CPTII,S$GLB,, | Performed by: FAMILY MEDICINE

## 2024-09-10 PROCEDURE — 90471 IMMUNIZATION ADMIN: CPT | Mod: 59,S$GLB,, | Performed by: FAMILY MEDICINE

## 2024-09-10 PROCEDURE — 3008F BODY MASS INDEX DOCD: CPT | Mod: CPTII,S$GLB,, | Performed by: FAMILY MEDICINE

## 2024-09-10 PROCEDURE — 3288F FALL RISK ASSESSMENT DOCD: CPT | Mod: CPTII,S$GLB,, | Performed by: FAMILY MEDICINE

## 2024-09-10 PROCEDURE — 3074F SYST BP LT 130 MM HG: CPT | Mod: CPTII,S$GLB,, | Performed by: FAMILY MEDICINE

## 2024-09-10 PROCEDURE — 1101F PT FALLS ASSESS-DOCD LE1/YR: CPT | Mod: CPTII,S$GLB,, | Performed by: FAMILY MEDICINE

## 2024-09-10 PROCEDURE — 99214 OFFICE O/P EST MOD 30 MIN: CPT | Mod: 25,S$GLB,, | Performed by: FAMILY MEDICINE

## 2024-09-10 PROCEDURE — 90694 VACC AIIV4 NO PRSRV 0.5ML IM: CPT | Mod: S$GLB,,, | Performed by: FAMILY MEDICINE

## 2024-09-10 PROCEDURE — 3078F DIAST BP <80 MM HG: CPT | Mod: CPTII,S$GLB,, | Performed by: FAMILY MEDICINE

## 2024-09-10 PROCEDURE — 4010F ACE/ARB THERAPY RXD/TAKEN: CPT | Mod: CPTII,S$GLB,, | Performed by: FAMILY MEDICINE

## 2024-09-10 PROCEDURE — 1125F AMNT PAIN NOTED PAIN PRSNT: CPT | Mod: CPTII,S$GLB,, | Performed by: FAMILY MEDICINE

## 2024-09-10 PROCEDURE — 3044F HG A1C LEVEL LT 7.0%: CPT | Mod: CPTII,S$GLB,, | Performed by: FAMILY MEDICINE

## 2024-09-10 RX ORDER — SEMAGLUTIDE 2.68 MG/ML
2 INJECTION, SOLUTION SUBCUTANEOUS
Qty: 9 ML | Refills: 3 | Status: SHIPPED | OUTPATIENT
Start: 2024-09-10 | End: 2025-09-10

## 2024-09-10 RX ORDER — OLMESARTAN MEDOXOMIL 40 MG/1
40 TABLET ORAL DAILY
Qty: 90 TABLET | Refills: 3 | Status: SHIPPED | OUTPATIENT
Start: 2024-09-10

## 2024-09-10 RX ORDER — TAMSULOSIN HYDROCHLORIDE 0.4 MG/1
0.4 CAPSULE ORAL EVERY MORNING
Qty: 90 CAPSULE | Refills: 3 | Status: SHIPPED | OUTPATIENT
Start: 2024-09-10 | End: 2025-09-10

## 2024-09-10 RX ORDER — ATORVASTATIN CALCIUM 40 MG/1
40 TABLET, FILM COATED ORAL DAILY
Qty: 90 TABLET | Refills: 3 | Status: SHIPPED | OUTPATIENT
Start: 2024-09-10

## 2024-09-10 RX ORDER — PREGABALIN 100 MG/1
100 CAPSULE ORAL 3 TIMES DAILY
Qty: 90 CAPSULE | Refills: 3 | Status: SHIPPED | OUTPATIENT
Start: 2024-09-10

## 2024-09-10 RX ORDER — TRIAMCINOLONE ACETONIDE 40 MG/ML
40 INJECTION, SUSPENSION INTRA-ARTICULAR; INTRAMUSCULAR
Status: DISCONTINUED | OUTPATIENT
Start: 2024-09-10 | End: 2024-09-11 | Stop reason: HOSPADM

## 2024-09-10 RX ORDER — METFORMIN HYDROCHLORIDE 500 MG/1
500 TABLET, EXTENDED RELEASE ORAL 2 TIMES DAILY WITH MEALS
Qty: 180 TABLET | Refills: 1 | Status: SHIPPED | OUTPATIENT
Start: 2024-09-10

## 2024-09-10 RX ADMIN — TRIAMCINOLONE ACETONIDE 40 MG: 40 INJECTION, SUSPENSION INTRA-ARTICULAR; INTRAMUSCULAR at 05:09

## 2024-09-10 NOTE — PROGRESS NOTES
Patient ID: Jono Gonzalez is a 69 y.o. male.    Chief Complaint: Follow-up    History of Present Illness    CHIEF COMPLAINT:  Jono presents today for medication refills.    MEDICATIONS:  He is currently taking blood pressure medication, metformin for diabetes (recently reduced dosage), atorvastatin for cholesterol, tamsulosin for prostate, Ozempic for diabetes and weight loss (transitioning from 1 mg to 2 mg weekly), Lyrica for pain, allopurinol for gout, a diuretic, allergy medicine, and OTC baby aspirin. He denies current use of topical antifungal creams.    DIABETES MANAGEMENT:  He reports good glucose control with his current regimen, which includes Ozempic and metformin. He sometimes checks his glucose at home.    WEIGHT MANAGEMENT:  His current weight is 282 lbs, down from 289 lbs in January. He notes his clothes are becoming looser, requiring additional holes in his belt. He is increasing his Ozempic dose from 1 mg to 2 mg weekly to potentially accelerate weight loss and further curb appetite.    MUSCULOSKELETAL CONCERNS:  He reports bilateral knee pain, with the right knee being bone-on-bone and the left knee pain developing more recently. He has difficulty with stairs, uses a cane for walking, and hears popping sounds in the right knee. He received a cortisone injection from a knee specialist last year, which provided relief. He is considering knee replacement surgery and is attempting to lose weight to qualify for the procedure. The current pain is significantly affecting his mobility, causing him to walk with a limp. He has a history of back surgery performed by Dr. Gill, whom he continues to see for pain management.    PHARMACY PREFERENCES:  He uses [facility] for his blood pressure medication and [facility] at [place] on [address] for all other medications.    LIFESTYLE:  He is actively managing his blood pressure and glucose levels at home using a monitoring device. He expresses understanding  of the importance of maintaining a healthy lifestyle to control his blood pressure and glucose levels.      ROS:  General: -fever, -chills, -fatigue, -weight gain, -weight loss  Eyes: -vision changes, -redness, -discharge  ENT: -ear pain, -nasal congestion, -sore throat  Cardiovascular: -chest pain, -palpitations, -lower extremity edema  Respiratory: -cough, -shortness of breath  Gastrointestinal: -abdominal pain, -nausea, -vomiting, -diarrhea, -constipation, -blood in stool  Genitourinary: -dysuria, -hematuria, -frequency  Musculoskeletal: +joint pain, -muscle pain  Skin: -rash, -lesion  Neurological: -headache, -dizziness, -numbness, -tingling  Psychiatric: -anxiety, -depression, -sleep difficulty         Physical Exam    Vitals: Weight: 282 lbs.  General: No acute distress. Well-developed. Well-nourished.  Eyes: EOMI. Sclerae anicteric.  HENT: Normocephalic. Atraumatic. Nares patent. Moist oral mucosa.  Ears: Bilateral TMs clear. Bilateral EACs clear.  Cardiovascular: Regular rate. Regular rhythm. No murmurs. No rubs. No gallops. Normal S1, S2.  Respiratory: Normal respiratory effort. Clear to auscultation bilaterally. No rales. No rhonchi. No wheezing.  Abdomen: Soft. Non-tender. Non-distended. Normoactive bowel sounds.  Musculoskeletal: No  obvious deformity.  Extremities: No lower extremity edema.  Neurological: Alert & oriented x3. No slurred speech. Normal gait.  Psychiatric: Normal mood. Normal affect. Good insight. Good judgment.  Skin: Warm. Dry. No rash.         Assessment & Plan    Recommend continuing metformin at a lower dose in conjunction with Ozempic for optimal diabetes management and weight loss  Increased Ozempic dose from 1mg to 2mg weekly to improve weight loss and appetite suppression  Will administer bilateral knee corticosteroid injections for pain relief    DIABETES AND WEIGHT MANAGEMENT:  - Explained that metformin and Ozempic work through different mechanisms to lower blood sugar and aid  in weight loss.  - Emphasized importance of protein intake (meats, seafood, beans) while on Ozempic to maintain muscle mass during weight loss.  - Jono to focus on consuming protein-rich foods (chicken, beef, pork, shrimp, fish, salmon) to maintain muscle strength while losing weight.  - Decreased metformin from twice daily to daily.  - Increased Ozempic from 1mg to 2mg weekly; continue current dose until new prescription is filled, then start 2mg dose.  - Provided new prescription for 90-day supply of metformin.    KNEE PAIN:  - Advised that corticosteroid knee injections may take 1-2 days to become effective.  - Informed about potential for temporary blood sugar elevation following corticosteroid injections.  - Administered bilateral knee corticosteroid injections.    NEUROPATHIC PAIN:  - Continued Lyrica 100mg daily.    HYPERLIPIDEMIA:  - Refilled atorvastatin and sent to [facility] [place] on [address].    BENIGN PROSTATIC HYPERPLASIA:  - Refilled tamsulosin (Flomax) and sent to [facility] [place] on [address].    HYPERTENSION:  - Refilled olmesartan (blood pressure medicine) and sent to [facility].  - Refilled hydrochlorothiazide (water pill).    GOUT:  - Refilled allopurinol.            No follow-ups on file.    This note was generated with the assistance of ambient listening technology. Verbal consent was obtained by the patient and accompanying visitor(s) for the recording of patient appointment to facilitate this note. I attest to having reviewed and edited the generated note for accuracy, though some syntax or spelling errors may persist. Please contact the author of this note for any clarification.

## 2024-09-11 NOTE — PROCEDURES
Bilateral knee injection: R knee    Date/Time: 9/10/2024 5:20 PM    Performed by: Jim Linn MD  Authorized by: Jim Linn MD    Indications:  Pain and arthritis  Local anesthetic:  Lidocaine 2% without epinephrine    Details:  Needle Size:  25 G  Approach:  Lateral  Location:  Knee  Site:  R knee (and left knee injection)  Medications:  40 mg triamcinolone acetonide 40 mg/mL  Medications comment:  2 mL of lidocaine 2% w/o epi

## 2024-10-06 NOTE — PATIENT INSTRUCTIONS
Weight loss  Ozempic start at 0.25 mg once a week, then after 1 month increase to 0.5mg once a week   103

## 2024-11-05 ENCOUNTER — LAB VISIT (OUTPATIENT)
Dept: LAB | Facility: HOSPITAL | Age: 69
End: 2024-11-05
Attending: FAMILY MEDICINE
Payer: COMMERCIAL

## 2024-11-05 DIAGNOSIS — E78.49 OTHER HYPERLIPIDEMIA: ICD-10-CM

## 2024-11-05 DIAGNOSIS — E11.9 TYPE 2 DIABETES MELLITUS WITHOUT COMPLICATION, WITHOUT LONG-TERM CURRENT USE OF INSULIN: ICD-10-CM

## 2024-11-05 LAB
ESTIMATED AVG GLUCOSE: 117 MG/DL (ref 68–131)
HBA1C MFR BLD: 5.7 % (ref 4–5.6)

## 2024-11-05 PROCEDURE — 83036 HEMOGLOBIN GLYCOSYLATED A1C: CPT | Performed by: FAMILY MEDICINE

## 2024-11-05 PROCEDURE — 80061 LIPID PANEL: CPT | Performed by: FAMILY MEDICINE

## 2024-11-06 LAB
CHOLEST SERPL-MCNC: 105 MG/DL (ref 120–199)
CHOLEST/HDLC SERPL: 2.2 {RATIO} (ref 2–5)
HDLC SERPL-MCNC: 48 MG/DL (ref 40–75)
HDLC SERPL: 45.7 % (ref 20–50)
LDLC SERPL CALC-MCNC: 45.2 MG/DL (ref 63–159)
NONHDLC SERPL-MCNC: 57 MG/DL
TRIGL SERPL-MCNC: 59 MG/DL (ref 30–150)

## 2024-12-01 DIAGNOSIS — I10 PRIMARY HYPERTENSION: ICD-10-CM

## 2024-12-01 NOTE — TELEPHONE ENCOUNTER
Care Due:                  Date            Visit Type   Department     Provider  --------------------------------------------------------------------------------                                EP -                              PRIMARY      [facility] PRIMARY  Last Visit: 09-      CARE ([facility])   CARE           ANGELLA BASHIR  Next Visit: None Scheduled  None         None Found                                                            Last  Test          Frequency    Reason                     Performed    Due Date  --------------------------------------------------------------------------------    CMP.........  12 months..  atorvastatin.............  08- 08-    [facility] Embedded Care Due Messages. Reference number: [id number].   12/01/2024 1:15:18 PM CST

## 2024-12-02 RX ORDER — FUROSEMIDE 40 MG/1
40 TABLET ORAL DAILY
Qty: 90 TABLET | Refills: 3 | Status: SHIPPED | OUTPATIENT
Start: 2024-12-02

## 2024-12-03 ENCOUNTER — PATIENT MESSAGE (OUTPATIENT)
Dept: PRIMARY CARE CLINIC | Facility: CLINIC | Age: 69
End: 2024-12-03
Payer: COMMERCIAL

## 2024-12-03 DIAGNOSIS — M10.9 GOUTY ARTHROPATHY: Primary | ICD-10-CM

## 2024-12-03 RX ORDER — ALLOPURINOL 300 MG/1
300 TABLET ORAL DAILY
Qty: 90 TABLET | Refills: 0 | Status: SHIPPED | OUTPATIENT
Start: 2024-12-03

## 2024-12-03 RX ORDER — OLMESARTAN MEDOXOMIL 40 MG/1
40 TABLET ORAL DAILY
Qty: 90 TABLET | Refills: 3 | Status: SHIPPED | OUTPATIENT
Start: 2024-12-03

## 2025-02-06 ENCOUNTER — PATIENT MESSAGE (OUTPATIENT)
Dept: ADMINISTRATIVE | Facility: OTHER | Age: 70
End: 2025-02-06
Payer: COMMERCIAL

## 2025-02-22 DIAGNOSIS — Z00.00 ENCOUNTER FOR MEDICARE ANNUAL WELLNESS EXAM: ICD-10-CM

## 2025-03-05 DIAGNOSIS — E11.9 DIABETES MELLITUS WITHOUT COMPLICATION: ICD-10-CM

## 2025-03-05 RX ORDER — METFORMIN HYDROCHLORIDE 500 MG/1
500 TABLET, EXTENDED RELEASE ORAL 2 TIMES DAILY WITH MEALS
Qty: 180 TABLET | Refills: 1 | Status: CANCELLED | OUTPATIENT
Start: 2025-03-05

## 2025-03-05 NOTE — TELEPHONE ENCOUNTER
Care Due:                  Date            Visit Type   Department     Provider  --------------------------------------------------------------------------------                                EP -                              PRIMARY      [facility] PRIMARY  Last Visit: 09-      CARE ([facility])   CARE           ANGELLA BASHIR  Next Visit: None Scheduled  None         None Found                                                            Last  Test          Frequency    Reason                     Performed    Due Date  --------------------------------------------------------------------------------    CMP.........  12 months..  atorvastatin, metFORMIN,   08- 08-                             olmesartan...............    HBA1C.......  6 months...  metFORMIN, semaglutide...  11- 05-    [facility] Embedded Care Due Messages. Reference number: [id number].   3/05/2025 11:53:33 AM CST

## 2025-03-06 DIAGNOSIS — M10.9 GOUTY ARTHROPATHY: ICD-10-CM

## 2025-03-07 ENCOUNTER — PATIENT MESSAGE (OUTPATIENT)
Dept: PRIMARY CARE CLINIC | Facility: CLINIC | Age: 70
End: 2025-03-07
Payer: COMMERCIAL

## 2025-03-07 RX ORDER — ALLOPURINOL 300 MG/1
300 TABLET ORAL DAILY
Qty: 90 TABLET | Refills: 0 | OUTPATIENT
Start: 2025-03-07

## 2025-03-10 DIAGNOSIS — E11.9 DIABETES MELLITUS WITHOUT COMPLICATION: ICD-10-CM

## 2025-03-10 RX ORDER — METFORMIN HYDROCHLORIDE 500 MG/1
500 TABLET, EXTENDED RELEASE ORAL 2 TIMES DAILY WITH MEALS
Qty: 180 TABLET | Refills: 1 | Status: SHIPPED | OUTPATIENT
Start: 2025-03-10

## 2025-03-10 NOTE — TELEPHONE ENCOUNTER
No care due was identified.  [facility] Embedded Care Due Messages. Reference number: [id number].   3/10/2025 1:13:51 PM CDT

## 2025-03-19 ENCOUNTER — PATIENT MESSAGE (OUTPATIENT)
Dept: ADMINISTRATIVE | Facility: HOSPITAL | Age: 70
End: 2025-03-19
Payer: COMMERCIAL

## 2025-03-19 ENCOUNTER — PATIENT MESSAGE (OUTPATIENT)
Dept: ADMINISTRATIVE | Facility: OTHER | Age: 70
End: 2025-03-19
Payer: COMMERCIAL

## 2025-03-20 ENCOUNTER — PATIENT OUTREACH (OUTPATIENT)
Dept: ADMINISTRATIVE | Facility: HOSPITAL | Age: 70
End: 2025-03-20
Payer: COMMERCIAL

## 2025-03-20 DIAGNOSIS — E11.42 TYPE 2 DIABETES MELLITUS WITH DIABETIC POLYNEUROPATHY, UNSPECIFIED WHETHER LONG TERM INSULIN USE: Primary | ICD-10-CM

## 2025-04-01 ENCOUNTER — OFFICE VISIT (OUTPATIENT)
Dept: PRIMARY CARE CLINIC | Facility: CLINIC | Age: 70
End: 2025-04-01
Payer: COMMERCIAL

## 2025-04-01 VITALS
HEART RATE: 74 BPM | TEMPERATURE: 98 F | OXYGEN SATURATION: 98 % | DIASTOLIC BLOOD PRESSURE: 84 MMHG | HEIGHT: 67 IN | BODY MASS INDEX: 42.86 KG/M2 | SYSTOLIC BLOOD PRESSURE: 133 MMHG | WEIGHT: 273.06 LBS

## 2025-04-01 DIAGNOSIS — E11.8 DIABETIC FEET: ICD-10-CM

## 2025-04-01 DIAGNOSIS — M10.9 GOUTY ARTHROPATHY: Primary | ICD-10-CM

## 2025-04-01 PROCEDURE — 3008F BODY MASS INDEX DOCD: CPT | Mod: CPTII,S$GLB,, | Performed by: STUDENT IN AN ORGANIZED HEALTH CARE EDUCATION/TRAINING PROGRAM

## 2025-04-01 PROCEDURE — 3288F FALL RISK ASSESSMENT DOCD: CPT | Mod: CPTII,S$GLB,, | Performed by: STUDENT IN AN ORGANIZED HEALTH CARE EDUCATION/TRAINING PROGRAM

## 2025-04-01 PROCEDURE — 3075F SYST BP GE 130 - 139MM HG: CPT | Mod: CPTII,S$GLB,, | Performed by: STUDENT IN AN ORGANIZED HEALTH CARE EDUCATION/TRAINING PROGRAM

## 2025-04-01 PROCEDURE — 1125F AMNT PAIN NOTED PAIN PRSNT: CPT | Mod: CPTII,S$GLB,, | Performed by: STUDENT IN AN ORGANIZED HEALTH CARE EDUCATION/TRAINING PROGRAM

## 2025-04-01 PROCEDURE — 99214 OFFICE O/P EST MOD 30 MIN: CPT | Mod: S$GLB,,, | Performed by: STUDENT IN AN ORGANIZED HEALTH CARE EDUCATION/TRAINING PROGRAM

## 2025-04-01 PROCEDURE — 1101F PT FALLS ASSESS-DOCD LE1/YR: CPT | Mod: CPTII,S$GLB,, | Performed by: STUDENT IN AN ORGANIZED HEALTH CARE EDUCATION/TRAINING PROGRAM

## 2025-04-01 PROCEDURE — 3079F DIAST BP 80-89 MM HG: CPT | Mod: CPTII,S$GLB,, | Performed by: STUDENT IN AN ORGANIZED HEALTH CARE EDUCATION/TRAINING PROGRAM

## 2025-04-01 PROCEDURE — 99999 PR PBB SHADOW E&M-EST. PATIENT-LVL V: CPT | Mod: PBBFAC,,, | Performed by: STUDENT IN AN ORGANIZED HEALTH CARE EDUCATION/TRAINING PROGRAM

## 2025-04-01 PROCEDURE — 4010F ACE/ARB THERAPY RXD/TAKEN: CPT | Mod: CPTII,S$GLB,, | Performed by: STUDENT IN AN ORGANIZED HEALTH CARE EDUCATION/TRAINING PROGRAM

## 2025-04-01 PROCEDURE — 1159F MED LIST DOCD IN RCRD: CPT | Mod: CPTII,S$GLB,, | Performed by: STUDENT IN AN ORGANIZED HEALTH CARE EDUCATION/TRAINING PROGRAM

## 2025-04-01 RX ORDER — COLCHICINE 0.6 MG/1
TABLET ORAL
Qty: 60 TABLET | Refills: 3 | Status: SHIPPED | OUTPATIENT
Start: 2025-04-01

## 2025-04-01 NOTE — PROGRESS NOTES
[facility]   Primary Care Visit    DATE   04/01/2025 3:06 PM    REASON FOR VISIT LISTED IN EPIC   Callouses (R foot callus. Been there for about 2 months. Pain rated as 10/10. Pt pointed to the area of base of the fifth. /Work: Teaches drivers ed. Pt denies hitting his foot, nor falling on his foot. )    Last Primary Care Visit: Visit date not found    HISTORY OF PRESENTING ILLNESS   Jono Gonzalez, 70 y.o., presents today due to ***. He has a past medical history of Arthritis, Cataract, Chronic back pain, Diabetes mellitus, Edema of both lower extremities, Gout attack, Hyperlipidemia, Hypertension, Prostate cancer, Sleep apnea, Swelling of lower limb, Trouble in sleeping, Type 2 diabetes mellitus, and Wound infection.    Today, the patient reports ***.     Current Medications:  Current Outpatient Medications   Medication Sig    allopurinoL (ZYLOPRIM) 300 MG tablet Take 1 tablet (300 mg total) by mouth once daily.    aspirin (ECOTRIN) 81 MG EC tablet Take 81 mg by mouth once daily.    atorvastatin (LIPITOR) 40 MG tablet Take 1 tablet (40 mg total) by mouth once daily. High cholesterol    furosemide (LASIX) 40 MG tablet Take 1 tablet (40 mg total) by mouth once daily.    ibuprofen (ADVIL,MOTRIN) 600 MG tablet TAKE 1 TABLET(600 MG) BY MOUTH EVERY 6 HOURS WITH FOOD AS NEEDED FOR PAIN OR INFLAMMATION    metFORMIN (GLUCOPHAGE-XR) 500 MG ER 24hr tablet Take 1 tablet (500 mg total) by mouth 2 (two) times daily with meals.    naproxen (NAPROSYN) 500 MG tablet Take 500 mg by mouth 2 (two) times daily as needed.    olmesartan (BENICAR) 40 MG tablet Take 1 tablet (40 mg total) by mouth once daily. Blood pressure    pregabalin (LYRICA) 100 MG capsule Take 1 capsule (100 mg total) by mouth 3 (three) times daily.    semaglutide (OZEMPIC) 2 mg/dose (8 mg/3 mL) PnIj Inject 2 mg into the skin every 7 days.    sildenafiL (VIAGRA) 100 MG tablet Take 1 tablet (100 mg total) by mouth daily as  needed for Erectile Dysfunction (take on an empty stomach 1 hour prior to sex).    tamsulosin (FLOMAX) 0.4 mg Cap Take 1 capsule (0.4 mg total) by mouth every morning.    HYDROcodone-acetaminophen (NORCO)  mg per tablet Take 1 tablet by mouth. (Patient not taking: Reported on 4/1/2025)    hydrocodone-acetaminophen 5-325mg (NORCO) 5-325 mg per tablet TK 1 T PO  Q 6 TO 8 H (Patient not taking: Reported on 4/1/2025)   Last reviewed on 4/1/2025  3:02 PM by Marcela Pierson MA    Allergies:  Review of patient's allergies indicates:  No Known Allergies    SUBJECTIVE   Review of Systems:  Review of Systems     OBJECTIVE   Vital Signs:  There were no vitals filed for this visit. There is no height or weight on file to calculate BMI.    Previous Weight:  Wt Readings from Last 3 Encounters:   09/10/24 128.2 kg (282 lb 10.1 oz)   05/01/24 127.9 kg (282 lb)   04/01/24 128.3 kg (282 lb 11.8 oz)        Physical Exam:  Physical Exam    Laboratory Results:  Lab Results   Component Value Date/Time    HGB 13.8 (L) 04/01/2024 04:34 PM    HCT 43.3 04/01/2024 04:34 PM    WBC 8.54 04/01/2024 04:34 PM     04/01/2024 04:34 PM    GLU 79 04/01/2024 04:34 PM    TSH 1.038 08/24/2023 04:06 PM    CHOL 105 (L) 11/05/2024 03:40 PM    HDL 48 11/05/2024 03:40 PM    TRIG 59 11/05/2024 03:40 PM    ALT 14 08/24/2023 04:06 PM    AST 16 08/24/2023 04:06 PM    K 4.3 04/01/2024 04:34 PM     04/01/2024 04:34 PM     04/01/2024 04:34 PM    BUN 20 04/01/2024 04:34 PM        PAST MEDICAL HISTORY and FAMILY HISTORY   Past medical, surgical, and family history: Reviewed and updated in EMR.     He has a past medical history of Arthritis, Cataract, Chronic back pain, Diabetes mellitus, Edema of both lower extremities, Gout attack, Hyperlipidemia, Hypertension, Prostate cancer, Sleep apnea, Swelling of lower limb, Trouble in sleeping, Type 2 diabetes mellitus, and Wound infection. He has a past surgical history that includes lumbar fusion;  Back surgery; and Colonoscopy (N/A, 11/20/2023). His family history includes Diabetes in his father, mother, sister, and sister; Heart disease in his brother, father, mother, and sister; Stroke in his sister; Thyroid disease in his mother.    SOCIAL HISTORY   Reviewed and updated social history in EMR.    He reports that he has never smoked. He has never used smokeless tobacco. He reports current alcohol use. He reports that he does not use drugs.     HEALTH MAINTENANCE PLANNING     Health Maintenance         Date Due Completion Date    RSV Vaccine (Age 60+ and Pregnant patients) (1 - Risk 60-74 years 1-dose series) Never done ---    Diabetic Eye Exam 07/27/2023 7/27/2022    Foot Exam 08/31/2024 8/31/2023    Influenza Vaccine (1) 09/10/2024 9/10/2024    TETANUS VACCINE 10/22/2024 10/22/2014    Shingles Vaccine (2 of 3) 09/10/2025 (Originally 6/28/2016) 5/3/2016    COVID-19 Vaccine (4 - 2024-25 season) 09/10/2025 (Originally 9/1/2024) 11/1/2021    Pneumococcal Vaccines (Age 50+) (3 of 3 - PCV20 or PCV21) 09/10/2025 (Originally 10/11/2023) 10/11/2018    Hemoglobin A1c 05/05/2025 11/5/2024    Low Dose Statin 09/10/2025 9/10/2024    Diabetes Urine Screening 11/05/2025 11/5/2024    Lipid Panel 11/05/2025 11/5/2024    Colorectal Cancer Screening 11/20/2028 11/20/2023            Diabetes Screening:  Hemoglobin A1C   Date Value Ref Range Status   11/05/2024 5.7 (H) 4.0 - 5.6 % Final     Comment:     ADA Screening Guidelines:  5.7-6.4%  Consistent with prediabetes  >or=6.5%  Consistent with diabetes    High levels of fetal hemoglobin interfere with the HbA1C  assay. Heterozygous hemoglobin variants (HbS, HgC, etc)do  not significantly interfere with this assay.   However, presence of multiple variants may affect accuracy.     04/01/2024 5.5 4.0 - 5.6 % Final     Comment:     ADA Screening Guidelines:  5.7-6.4%  Consistent with prediabetes  >or=6.5%  Consistent with diabetes    High levels of fetal hemoglobin interfere with  "the HbA1C  assay. Heterozygous hemoglobin variants (HbS, HgC, etc)do  not significantly interfere with this assay.   However, presence of multiple variants may affect accuracy.          Cardiac Risk Screening:  The ASCVD Risk score (Shantanu MORALES, et al., 2019) failed to calculate for the following reasons:    The valid total cholesterol range is 130 to 320 mg/dL    ASSESSMENT and PLAN     Jono Gonzalez should follow up {display:[id number]:o:"in *** weeks","in *** months","in the future on an as-needed basis"}, or at least annually for a wellness visit.  There are no diagnoses linked to this encounter.     Health Maintenance Discussed  - Patient advised of health maintenance recommendations for age, sex, and personal/social risk factors.   - Patient advised of benefits of receiving a wellness exam with health maintenance lab work and recommended vaccines annually.    Recommended Immunizations Discussed   - Patient advised of the benefits and risks of receiving recommended immunizations for health maintenance.    - Patient advised of the most common side effects of immunizations, including injection site soreness, injection site redness, injection site pain, and tiredness/fatigue for about 48-72 hours.   - Patient advised that severe side effects are very rare, including severe allergic reactions, wheezing, difficulties in breathing or shortness of breath.   - For in clinic administration of vaccine, patient advised that it is recommended that the patient stays in the waiting room for observation for at least 15 minutes to monitor for any reaction(s) to the vaccine.    Patient was counseled today on:  - Diet:  Patient has been advised to follow a diet emphasizing vegetables, fruits, whole grains, low-fat dairy products, fish, legumes and nuts. Patient has been advised to limit sodium, sweets and red meat. Benefit of 15-20 minute post-prandial walk/stroll was discussed.  - Exercise: Patient has been advised that regular " physical activity positively impacts health. Patients who are able should engage in mild-to-moderate-intensity aerobic for a minimum of 20-30 minutes 4-5 times a week.    - Smoking and Alcohol:  Harmful effects of smoking, etOH, and recreational drugs discussed. Patient has been advised that counseling with or without drug therapy is available to help quit smoking.  - Sleep Apnea: Patient has been advised that a referral for DAVID testing is available, and that treatment with positive airway pressure may be beneficial if he is found to have DAVID.    The above asssessment and plan was discussed with Dr. Puckett.    --  Ulysses Bernard   Medical Student

## 2025-04-01 NOTE — PROGRESS NOTES
"[facility]   Primary Care Visit    DATE   04/01/2025 3:10 PM    REASON FOR VISIT LISTED IN EPIC   Callouses (R foot callus. Been there for about 2 months. Pain rated as 10/10. Pt pointed to the area of base of the fifth. /Work: Teaches drivers ed. Pt denies hitting his foot, nor falling on his foot. )    Last Primary Care Visit: Visit date not found    HISTORY OF PRESENTING ILLNESS   Jono Gonzalez, 70 y.o., presents today due to right foot pain. He has a past medical history of Arthritis, Cataract, Chronic back pain, Diabetes mellitus, Edema of both lower extremities, Gout attack, Hyperlipidemia, Hypertension, Prostate cancer, Sleep apnea, Swelling of lower limb, Trouble in sleeping, Type 2 diabetes mellitus, and Wound infection.    Today, the patient reports 2 months of right lateral foot pain, at the distal end of the 5th metatarsal. He rates it a 10/10 pain and describes it as a shooting, burning, electric pain. He denies radiation or pain elsewhere. He denies any trauma or injury to the foot prior to onset of symptoms. He states it is worse in the evening after he gets home and begins walking around his house. He is a 's  so he spends most of his day sitting and the pain is not as bothersome then. He has not taken anything for the pain aside from Lyrica 100 mg 3 times daily which he reports has not improved his pain. He states that taking a hot shower helps relieve his pain somewhat. He attributes the pain to the callus on the sole of his foot. He has not seen a podiatrist in the past though he states it was years ago.    He also reports noticing a "bump" on his left eyelid for the past week that has been steadily enlarging. It is not painful and he has not yet tried anything for it.     Current Medications:  Current Outpatient Medications   Medication Sig    allopurinoL (ZYLOPRIM) 300 MG tablet Take 1 tablet (300 mg total) by mouth once daily.    aspirin " "(ECOTRIN) 81 MG EC tablet Take 81 mg by mouth once daily.    atorvastatin (LIPITOR) 40 MG tablet Take 1 tablet (40 mg total) by mouth once daily. High cholesterol    furosemide (LASIX) 40 MG tablet Take 1 tablet (40 mg total) by mouth once daily.    ibuprofen (ADVIL,MOTRIN) 600 MG tablet TAKE 1 TABLET(600 MG) BY MOUTH EVERY 6 HOURS WITH FOOD AS NEEDED FOR PAIN OR INFLAMMATION    metFORMIN (GLUCOPHAGE-XR) 500 MG ER 24hr tablet Take 1 tablet (500 mg total) by mouth 2 (two) times daily with meals.    naproxen (NAPROSYN) 500 MG tablet Take 500 mg by mouth 2 (two) times daily as needed.    olmesartan (BENICAR) 40 MG tablet Take 1 tablet (40 mg total) by mouth once daily. Blood pressure    pregabalin (LYRICA) 100 MG capsule Take 1 capsule (100 mg total) by mouth 3 (three) times daily.    semaglutide (OZEMPIC) 2 mg/dose (8 mg/3 mL) PnIj Inject 2 mg into the skin every 7 days.    sildenafiL (VIAGRA) 100 MG tablet Take 1 tablet (100 mg total) by mouth daily as needed for Erectile Dysfunction (take on an empty stomach 1 hour prior to sex).    tamsulosin (FLOMAX) 0.4 mg Cap Take 1 capsule (0.4 mg total) by mouth every morning.    colchicine (COLCRYS) 0.6 mg tablet Take 2 tablets by mouth at the first sign of flare, followed by 0.6 mg after 1 hour. THEN 1 tablet twice daily    HYDROcodone-acetaminophen (NORCO)  mg per tablet Take 1 tablet by mouth. (Patient not taking: Reported on 4/1/2025)    hydrocodone-acetaminophen 5-325mg (NORCO) 5-325 mg per tablet TK 1 T PO  Q 6 TO 8 H (Patient not taking: Reported on 4/1/2025)   Last reviewed on 4/1/2025  3:10 PM by Marcela Pierson MA    Allergies:  Review of patient's allergies indicates:  No Known Allergies    SUBJECTIVE   Review of Systems:  Review of Systems   Constitutional:  Negative for chills, fatigue and fever.   HENT:  Negative for ear pain, hearing loss and tinnitus.    Eyes:  Positive for visual disturbance (blurry). Negative for pain.        "Bump" on L eyelid x 1 week " "  Respiratory:  Negative for cough and shortness of breath.    Cardiovascular:  Positive for leg swelling (chronic). Negative for chest pain and palpitations.   Gastrointestinal:  Negative for abdominal pain, constipation, diarrhea, nausea and vomiting.   Endocrine: Negative for polyuria.   Genitourinary:  Negative for dysuria, frequency and urgency.   Musculoskeletal:  Positive for arthralgias (chronic). Negative for myalgias.   Skin:  Negative for rash.   Neurological:  Negative for dizziness and headaches.   Psychiatric/Behavioral:  Negative for dysphoric mood and sleep disturbance. The patient is not nervous/anxious.         OBJECTIVE   Vital Signs:  Vitals:    04/01/25 1504   BP: 133/84   BP Location: Right arm   Patient Position: Sitting   Pulse: 74   Temp: 97.8 °F (36.6 °C)   TempSrc: Oral   SpO2: 98%   Weight: 123.9 kg (273 lb 0.6 oz)   Height: 5' 7" (1.702 m)    Body mass index is 42.76 kg/m².    Previous Weight:  Wt Readings from Last 3 Encounters:   04/01/25 123.9 kg (273 lb 0.6 oz)   09/10/24 128.2 kg (282 lb 10.1 oz)   05/01/24 127.9 kg (282 lb)        Physical Exam:  Physical Exam  Constitutional:       General: He is not in acute distress.     Appearance: He is not ill-appearing.   HENT:      Head: Normocephalic and atraumatic.   Eyes:      General: No scleral icterus.        Right eye: No discharge.         Left eye: No discharge.        Comments: Approximately 8 mm raised, mildly erythematous papule on left medial upper eyelid. No drainage or discharge   Cardiovascular:      Rate and Rhythm: Normal rate and regular rhythm.      Pulses: Normal pulses.      Heart sounds: Normal heart sounds. No murmur heard.     No friction rub. No gallop.   Pulmonary:      Effort: Pulmonary effort is normal. No respiratory distress.      Breath sounds: Normal breath sounds. No wheezing or rales.   Abdominal:      General: Abdomen is flat. Bowel sounds are normal. There is no distension.      Palpations: Abdomen is " soft.      Tenderness: There is no abdominal tenderness. There is no guarding.   Musculoskeletal:        Feet:    Feet:      Right foot:      Skin integrity: Skin breakdown (interdigital maceration), callus and dry skin present. No erythema or warmth.      Toenail Condition: Fungal disease present.     Left foot:      Skin integrity: Skin breakdown (interdigital maceration), callus and dry skin present. No erythema or warmth.      Toenail Condition: Fungal disease present.     Comments: Callus on plantar aspect of bilateral feet at distal end of 5th metatarsals  Skin:     General: Skin is warm and dry.   Neurological:      General: No focal deficit present.      Mental Status: He is alert.   Psychiatric:         Mood and Affect: Mood normal.         Behavior: Behavior normal.         Thought Content: Thought content normal.         Judgment: Judgment normal.       Laboratory Results:  Lab Results   Component Value Date/Time    HGB 13.8 (L) 04/01/2024 04:34 PM    HCT 43.3 04/01/2024 04:34 PM    WBC 8.54 04/01/2024 04:34 PM     04/01/2024 04:34 PM    GLU 79 04/01/2024 04:34 PM    TSH 1.038 08/24/2023 04:06 PM    CHOL 105 (L) 11/05/2024 03:40 PM    HDL 48 11/05/2024 03:40 PM    TRIG 59 11/05/2024 03:40 PM    ALT 14 08/24/2023 04:06 PM    AST 16 08/24/2023 04:06 PM    K 4.3 04/01/2024 04:34 PM     04/01/2024 04:34 PM     04/01/2024 04:34 PM    BUN 20 04/01/2024 04:34 PM        PAST MEDICAL HISTORY and FAMILY HISTORY   Past medical, surgical, and family history: Reviewed and updated in EMR.     He has a past medical history of Arthritis, Cataract, Chronic back pain, Diabetes mellitus, Edema of both lower extremities, Gout attack, Hyperlipidemia, Hypertension, Prostate cancer, Sleep apnea, Swelling of lower limb, Trouble in sleeping, Type 2 diabetes mellitus, and Wound infection. He has a past surgical history that includes lumbar fusion; Back surgery; and Colonoscopy (N/A, 11/20/2023). His family  history includes Diabetes in his father, mother, sister, and sister; Heart disease in his brother, father, mother, and sister; Stroke in his sister; Thyroid disease in his mother.    SOCIAL HISTORY   Reviewed and updated social history in EMR.    He reports that he has never smoked. He has never used smokeless tobacco. He reports current alcohol use. He reports that he does not use drugs.     HEALTH MAINTENANCE PLANNING     Health Maintenance         Date Due Completion Date    RSV Vaccine (Age 60+ and Pregnant patients) (1 - Risk 60-74 years 1-dose series) Never done ---    Diabetic Eye Exam 07/27/2023 7/27/2022    Foot Exam 08/31/2024 8/31/2023    Influenza Vaccine (1) 09/10/2024 9/10/2024    TETANUS VACCINE 10/22/2024 10/22/2014    Shingles Vaccine (2 of 3) 09/10/2025 (Originally 6/28/2016) 5/3/2016    COVID-19 Vaccine (4 - 2024-25 season) 09/10/2025 (Originally 9/1/2024) 11/1/2021    Pneumococcal Vaccines (Age 50+) (3 of 3 - PCV20 or PCV21) 09/10/2025 (Originally 10/11/2023) 10/11/2018    Hemoglobin A1c 05/05/2025 11/5/2024    Diabetes Urine Screening 11/05/2025 11/5/2024    Lipid Panel 11/05/2025 11/5/2024    Low Dose Statin 04/01/2026 4/1/2025    Colorectal Cancer Screening 11/20/2028 11/20/2023            Diabetes Screening:  Hemoglobin A1C   Date Value Ref Range Status   11/05/2024 5.7 (H) 4.0 - 5.6 % Final     Comment:     ADA Screening Guidelines:  5.7-6.4%  Consistent with prediabetes  >or=6.5%  Consistent with diabetes    High levels of fetal hemoglobin interfere with the HbA1C  assay. Heterozygous hemoglobin variants (HbS, HgC, etc)do  not significantly interfere with this assay.   However, presence of multiple variants may affect accuracy.     04/01/2024 5.5 4.0 - 5.6 % Final     Comment:     ADA Screening Guidelines:  5.7-6.4%  Consistent with prediabetes  >or=6.5%  Consistent with diabetes    High levels of fetal hemoglobin interfere with the HbA1C  assay. Heterozygous hemoglobin variants (HbS, HgC,  etc)do  not significantly interfere with this assay.   However, presence of multiple variants may affect accuracy.          Cardiac Risk Screening:  The ASCVD Risk score (Shantanu DK, et al., 2019) failed to calculate for the following reasons:    The valid total cholesterol range is 130 to 320 mg/dL    ASSESSMENT and PLAN     Jono Gonzalez should follow up in the future on an as-needed basis, or at least annually for a wellness visit. Has f/u with Dr. Linn on 4/25/25  Gouty arthropathy  -     Uric Acid; Future; Expected date: 04/01/2025  -     CBC Auto Differential; Future; Expected date: 04/01/2025  -     Comprehensive Metabolic Panel; Future; Expected date: 04/01/2025  -     Sedimentation rate; Future; Expected date: 04/01/2025  -     C-reactive protein; Future; Expected date: 04/01/2025  -     colchicine (COLCRYS) 0.6 mg tablet; Take 2 tablets by mouth at the first sign of flare, followed by 0.6 mg after 1 hour. THEN 1 tablet twice daily  Dispense: 60 tablet; Refill: 3  - Follow up if no improvement or if condition worsens    Diabetic feet  -     Ambulatory referral/consult to Podiatry; Future; Expected date: 04/01/2025    Tinea pedis        - Advised patient to obtain Lamisil spray to use daily in between toes and in footwear       The above asssessment and plan was discussed with Dr. Puckett.    --  Ulysses Bernard   Medical Student    I hereby acknowledge that I am relying upon documentation authored by a medical student working under my supervision and further I hereby attest that I have verified the student documentation or findings by personally performing the physical exam and medical decision making activities of the Evaluation and Management service to be billed.    Juli Puckett MD

## 2025-04-02 ENCOUNTER — LAB VISIT (OUTPATIENT)
Dept: PRIMARY CARE CLINIC | Facility: CLINIC | Age: 70
End: 2025-04-02
Payer: COMMERCIAL

## 2025-04-02 ENCOUNTER — HOSPITAL ENCOUNTER (OUTPATIENT)
Dept: RADIOLOGY | Facility: HOSPITAL | Age: 70
Discharge: HOME | End: 2025-04-02
Attending: PHYSICIAN ASSISTANT
Payer: COMMERCIAL

## 2025-04-02 ENCOUNTER — OFFICE VISIT (OUTPATIENT)
Dept: ORTHOPEDICS | Facility: CLINIC | Age: 70
End: 2025-04-02
Payer: COMMERCIAL

## 2025-04-02 DIAGNOSIS — M84.451A INSUFFICIENCY FRACTURE OF FEMUR, RIGHT, INITIAL ENCOUNTER: Primary | ICD-10-CM

## 2025-04-02 DIAGNOSIS — M17.10 ARTHRITIS OF KNEE: ICD-10-CM

## 2025-04-02 DIAGNOSIS — M17.0 PRIMARY OSTEOARTHRITIS OF BOTH KNEES: ICD-10-CM

## 2025-04-02 DIAGNOSIS — M10.9 GOUTY ARTHROPATHY: ICD-10-CM

## 2025-04-02 DIAGNOSIS — M17.11 PRIMARY OSTEOARTHRITIS OF RIGHT KNEE: ICD-10-CM

## 2025-04-02 LAB
ABSOLUTE EOSINOPHIL (OHS): 0.09 K/UL
ABSOLUTE MONOCYTE (OHS): 0.59 K/UL (ref 0.3–1)
ABSOLUTE NEUTROPHIL COUNT (OHS): 5.65 K/UL (ref 1.8–7.7)
ALBUMIN SERPL BCP-MCNC: 3.4 G/DL (ref 3.5–5.2)
ALP SERPL-CCNC: 82 UNIT/L (ref 40–150)
ALT SERPL W/O P-5'-P-CCNC: 12 UNIT/L (ref 10–44)
ANION GAP (OHS): 8 MMOL/L (ref 8–16)
AST SERPL-CCNC: 18 UNIT/L (ref 11–45)
BASOPHILS # BLD AUTO: 0.03 K/UL
BASOPHILS NFR BLD AUTO: 0.4 %
BILIRUB SERPL-MCNC: 0.8 MG/DL (ref 0.1–1)
BUN SERPL-MCNC: 13 MG/DL (ref 8–23)
CALCIUM SERPL-MCNC: 8.8 MG/DL (ref 8.7–10.5)
CHLORIDE SERPL-SCNC: 106 MMOL/L (ref 95–110)
CO2 SERPL-SCNC: 25 MMOL/L (ref 23–29)
CREAT SERPL-MCNC: 0.8 MG/DL (ref 0.5–1.4)
CRP SERPL-MCNC: 4.3 MG/L
ERYTHROCYTE [DISTWIDTH] IN BLOOD BY AUTOMATED COUNT: 14.7 % (ref 11.5–14.5)
ERYTHROCYTE [SEDIMENTATION RATE] IN BLOOD BY PHOTOMETRIC METHOD: 31 MM/HR
GFR SERPLBLD CREATININE-BSD FMLA CKD-EPI: >60 ML/MIN/1.73/M2
GLUCOSE SERPL-MCNC: 78 MG/DL (ref 70–110)
HCT VFR BLD AUTO: 42.5 % (ref 40–54)
HGB BLD-MCNC: 13.7 GM/DL (ref 14–18)
IMM GRANULOCYTES # BLD AUTO: 0.02 K/UL (ref 0–0.04)
IMM GRANULOCYTES NFR BLD AUTO: 0.2 % (ref 0–0.5)
LYMPHOCYTES # BLD AUTO: 1.67 K/UL (ref 1–4.8)
MCH RBC QN AUTO: 27.4 PG (ref 27–31)
MCHC RBC AUTO-ENTMCNC: 32.2 G/DL (ref 32–36)
MCV RBC AUTO: 85 FL (ref 82–98)
NUCLEATED RBC (/100WBC) (OHS): 0 /100 WBC
PLATELET # BLD AUTO: 234 K/UL (ref 150–450)
PMV BLD AUTO: 9.7 FL (ref 9.2–12.9)
POTASSIUM SERPL-SCNC: 4.2 MMOL/L (ref 3.5–5.1)
PROT SERPL-MCNC: 6.6 GM/DL (ref 6–8.4)
RBC # BLD AUTO: 5 M/UL (ref 4.6–6.2)
RELATIVE EOSINOPHIL (OHS): 1.1 %
RELATIVE LYMPHOCYTE (OHS): 20.7 % (ref 18–48)
RELATIVE MONOCYTE (OHS): 7.3 % (ref 4–15)
RELATIVE NEUTROPHIL (OHS): 70.3 % (ref 38–73)
SODIUM SERPL-SCNC: 139 MMOL/L (ref 136–145)
URATE SERPL-MCNC: 7.7 MG/DL (ref 3.4–7)
WBC # BLD AUTO: 8.05 K/UL (ref 3.9–12.7)

## 2025-04-02 PROCEDURE — 4010F ACE/ARB THERAPY RXD/TAKEN: CPT | Mod: CPTII,S$GLB,, | Performed by: PHYSICIAN ASSISTANT

## 2025-04-02 PROCEDURE — 80053 COMPREHEN METABOLIC PANEL: CPT

## 2025-04-02 PROCEDURE — 99999 PR PBB SHADOW E&M-EST. PATIENT-LVL III: CPT | Mod: PBBFAC,,, | Performed by: PHYSICIAN ASSISTANT

## 2025-04-02 PROCEDURE — 85025 COMPLETE CBC W/AUTO DIFF WBC: CPT

## 2025-04-02 PROCEDURE — 99214 OFFICE O/P EST MOD 30 MIN: CPT | Mod: S$GLB,,, | Performed by: PHYSICIAN ASSISTANT

## 2025-04-02 PROCEDURE — 1160F RVW MEDS BY RX/DR IN RCRD: CPT | Mod: CPTII,S$GLB,, | Performed by: PHYSICIAN ASSISTANT

## 2025-04-02 PROCEDURE — 73564 X-RAY EXAM KNEE 4 OR MORE: CPT | Mod: TC,50

## 2025-04-02 PROCEDURE — 84550 ASSAY OF BLOOD/URIC ACID: CPT

## 2025-04-02 PROCEDURE — 1159F MED LIST DOCD IN RCRD: CPT | Mod: CPTII,S$GLB,, | Performed by: PHYSICIAN ASSISTANT

## 2025-04-02 PROCEDURE — 73564 X-RAY EXAM KNEE 4 OR MORE: CPT | Mod: 26,,, | Performed by: RADIOLOGY

## 2025-04-02 PROCEDURE — 36415 COLL VENOUS BLD VENIPUNCTURE: CPT | Mod: PN

## 2025-04-02 PROCEDURE — 85652 RBC SED RATE AUTOMATED: CPT

## 2025-04-02 PROCEDURE — 1125F AMNT PAIN NOTED PAIN PRSNT: CPT | Mod: CPTII,S$GLB,, | Performed by: PHYSICIAN ASSISTANT

## 2025-04-02 PROCEDURE — 86140 C-REACTIVE PROTEIN: CPT

## 2025-04-02 RX ORDER — HYDROCODONE BITARTRATE AND ACETAMINOPHEN 5; 325 MG/1; MG/1
1 TABLET ORAL EVERY 6 HOURS PRN
Qty: 12 TABLET | Refills: 0 | Status: SHIPPED | OUTPATIENT
Start: 2025-04-02

## 2025-04-02 NOTE — LETTER
April 2, 2025      [facility] [address] [facility]  [address]  [facility] [address]  Phone: [phone]  Fax: [phone]       Patient: Jono Gonzalez   YOB: 1955  Date of Visit: 04/02/2025    To Whom It May Concern:    Shameka Gonzalez  was at [facility] on 04/02/2025. The patient seen a nonweightbearing status with regards to the right knee pending further testing for possible fracture . If you have any questions or concerns, or if I can be of further assistance, please do not hesitate to contact me.    Sincerely,    Zach Ruiz PA-C

## 2025-04-02 NOTE — PROGRESS NOTES
SUBJECTIVE:     Chief Complaint & History of Present Illness:  Jono Gonzalez is a Established patient 70 y.o. male who is seen here today with a complaint of    Chief Complaint   Patient presents with    Right Knee - Pain    Left Knee - Pain    . History of Present Illness    - Bilateral knee pain, right worse than left.    - Presents with bilateral knee pain, more severe in the right knee  - Pain located in the front of both knees, in the same area as previous visits  - Rates right knee pain as 10/10 and left knee pain as 8/10  - Pain has been present for several months  - Reports hearing popping sounds when walking  - Experiences right knee instability about once a week, with occasional giving way during ambulation  - Denies falling as a result of this instability  - Work involves sitting most of the day, driving for 's education, and using the right foot for braking  - Denies spending a lot of time standing, walking, or going up and down ladders  - Uses stairs at home but takes his time  - Denies any history of osteoporosis    - Works as a   - Job primarily involves sitting for long periods in the car  - Occasionally gets out for short breaks before returning to the vehicle  - Right leg used for operating the brake pedal  - Work note provided to stay off work until MRI performed and results reviewed       On a scale of 1-10, with 10 being worst pain imaginable, he rates this pain as 9 on good days and 10 on bad days.  he describes the pain as sore and tender.    Review of patient's allergies indicates:  No Known Allergies      Current Medications[1]    Past Medical History:   Diagnosis Date    Arthritis     Cataract     Chronic back pain     Diabetes mellitus     Edema of both lower extremities 5/24/2021    Gout attack     Hyperlipidemia     Hypertension     Prostate cancer 9/20/2023    Sleep apnea     Swelling of lower limb 10/4/2021    Trouble in sleeping     Type 2 diabetes  mellitus     Wound infection 6/3/2022       Past Surgical History:   Procedure Laterality Date    BACK SURGERY      COLONOSCOPY N/A 11/20/2023    Procedure: COLONOSCOPY;  Surgeon: Paulina Orosco MD;  Location: [facility] ([address] [place] [address]);  Service: Endoscopy;  Laterality: N/A;  Referred by:  ROBERT Paredes Meds: Ozempic - inst to hold per protocol  Prep: suprep  Route instructions sent: portal  Pt states he is able to walk without SOB.     11/13-precall complete-pt verbalized understanding of holding Ozempic-MS    lumbar fusion      X 2       Vital Signs (Most Recent)  There were no vitals filed for this visit.        Review of Systems:  ROS:  Constitutional: no fever or chills, positive structure sleep apnea  Eyes: no visual changes  ENT: no nasal congestion or sore throat  Respiratory: no cough or shortness of breath  Cardiovascular: no chest pain or palpitations, positive for right bundle-branch block chronic venous insufficiency  Gastrointestinal: no nausea or vomiting, tolerating diet  Genitourinary: no hematuria or dysuria, positive BPH  Integument/Breast: no rash or pruritis  Hematologic/Lymphatic: no easy bruising or lymphadenopathy, positive lymphedema bilateral lower extremities  Musculoskeletal: no arthralgias or myalgias, positive history of gout, chronic low back pain venous stasis ulcer lower extremity  Neurological: no seizures or tremors  Behavioral/Psych: no auditory or visual hallucinations, positive for narcotics dependency  Endocrine: no heat or cold intolerance, positive diabetes type 2, morbid obesity, hypogonadism,                OBJECTIVE:     PHYSICAL EXAM:     , General Appearance: Well nourished, well developed, in no acute distress.  Neurological: Mood & affect are normal.    right  Knee Exam:  Knee Range of Motion:limited by pain and 0-100 degrees flexion   Effusion:none  Condition of skin:intact  Location of tenderness:Medial joint line   Strength:limited by pain  Stability:  Lachman:  stable, LCL: stable, MCL: stable, and PCL: stable  Varus /Valgus stress:  normal    left  Knee Exam:  Knee Range of Motion:limited by pain and 0-115 degrees flexion   Effusion:none  Condition of skin:intact  Location of tenderness:Medial joint line   Strength:limited by pain and 5 of 5  Stability:  Lachman: stable, LCL: stable, MCL: stable, and PCL: stable  Varus /Valgus stress:  normal      Hip Examination:  normal    RADIOGRAPHS:  X-rays of the knees taken today films reviewed by me demonstrate cortical defect in the medial aspect of the right femoral condyle with some evidence of collapse marked sclerotic changes osteophytic spurring tricompartmentally throughout both knees concern for insufficiency fracture    ASSESSMENT/PLAN:       ICD-10-CM ICD-9-CM   1. Primary osteoarthritis of right knee  M17.11 715.16   2. Arthritis of knee  M17.10 716.96   3. Insufficiency fracture of femur, right, initial encounter  M84.451A 733.14       Plan: We discussed with the patient at length all the different treatment options available for  the knee including anti-inflammatories, acetaminophen, rest, ice, knee strengthening exercise, occasional cortisone injections for temporary relief, Viscosupplimentation injections, arthroscopic debridement osteotomy, and finally knee arthroplasty.   Assessment & Plan    M84.451S Pathological fracture, right femur, sequela  M17.11 Unilateral primary osteoarthritis, right knee  M17.12 Unilateral primary osteoarthritis, left knee  M23.51 Chronic instability of knee, right knee  M25.661 Stiffness of right knee, not elsewhere classified  Z96.651 Presence of right artificial knee joint  Z91.81 History of falling  Z99.3 Dependence on wheelchair    MEDICATIONS:  - Pain medication.  Hydrocodone/acetaminophen 5/325 mg one tablet q.6 hours p.r.n. for breakthrough pain    IMAGING:  - Ordered MRI Right Knee to evaluate for fracture.    PROCEDURES:  - # Procedures  - Applied knee brace to right knee with  slight bend to prevent weight-bearing.  - Provided walker to assist with mobility and reduce weight on right lower extremity.    FOLLOW UP:  - Provided work note for time off until MRI is completed.  - Follow up after MRI results are available.    PATIENT INSTRUCTIONS:  - Use crutches for ambulation, avoiding weight-bearing on right lower extremity.  - Place only toe down on right foot for balance, imagining an egg under the foot.  - Limit movement to essential activities (bedroom to bathroom to kitchen to sofa).  - Use walker for stability when moving.              [1]   Current Outpatient Medications   Medication Sig Dispense Refill    allopurinoL (ZYLOPRIM) 300 MG tablet Take 1 tablet (300 mg total) by mouth once daily. 90 tablet 0    aspirin (ECOTRIN) 81 MG EC tablet Take 81 mg by mouth once daily.      atorvastatin (LIPITOR) 40 MG tablet Take 1 tablet (40 mg total) by mouth once daily. High cholesterol 90 tablet 3    colchicine (COLCRYS) 0.6 mg tablet Take 2 tablets by mouth at the first sign of flare, followed by 0.6 mg after 1 hour. THEN 1 tablet twice daily 60 tablet 3    furosemide (LASIX) 40 MG tablet Take 1 tablet (40 mg total) by mouth once daily. 90 tablet 3    HYDROcodone-acetaminophen (NORCO)  mg per tablet Take 1 tablet by mouth. (Patient not taking: Reported on 4/1/2025)      HYDROcodone-acetaminophen (NORCO) 5-325 mg per tablet Take 1 tablet by mouth every 6 (six) hours as needed for Pain. 12 tablet 0    hydrocodone-acetaminophen 5-325mg (NORCO) 5-325 mg per tablet TK 1 T PO  Q 6 TO 8 H (Patient not taking: Reported on 4/1/2025)  0    ibuprofen (ADVIL,MOTRIN) 600 MG tablet TAKE 1 TABLET(600 MG) BY MOUTH EVERY 6 HOURS WITH FOOD AS NEEDED FOR PAIN OR INFLAMMATION 90 tablet 2    metFORMIN (GLUCOPHAGE-XR) 500 MG ER 24hr tablet Take 1 tablet (500 mg total) by mouth 2 (two) times daily with meals. 180 tablet 1    naproxen (NAPROSYN) 500 MG tablet Take 500 mg by mouth 2 (two) times daily as needed.       olmesartan (BENICAR) 40 MG tablet Take 1 tablet (40 mg total) by mouth once daily. Blood pressure 90 tablet 3    pregabalin (LYRICA) 100 MG capsule Take 1 capsule (100 mg total) by mouth 3 (three) times daily. 90 capsule 3    semaglutide (OZEMPIC) 2 mg/dose (8 mg/3 mL) PnIj Inject 2 mg into the skin every 7 days. 9 mL 3    sildenafiL (VIAGRA) 100 MG tablet Take 1 tablet (100 mg total) by mouth daily as needed for Erectile Dysfunction (take on an empty stomach 1 hour prior to sex). 10 tablet 11    tamsulosin (FLOMAX) 0.4 mg Cap Take 1 capsule (0.4 mg total) by mouth every morning. 90 capsule 3     No current facility-administered medications for this visit.

## 2025-04-03 ENCOUNTER — PATIENT MESSAGE (OUTPATIENT)
Dept: PRIMARY CARE CLINIC | Facility: CLINIC | Age: 70
End: 2025-04-03
Payer: COMMERCIAL

## 2025-04-03 ENCOUNTER — TELEPHONE (OUTPATIENT)
Dept: PRIMARY CARE CLINIC | Facility: CLINIC | Age: 70
End: 2025-04-03
Payer: COMMERCIAL

## 2025-04-03 ENCOUNTER — RESULTS FOLLOW-UP (OUTPATIENT)
Dept: PRIMARY CARE CLINIC | Facility: CLINIC | Age: 70
End: 2025-04-03

## 2025-04-03 ENCOUNTER — HOSPITAL ENCOUNTER (OUTPATIENT)
Dept: RADIOLOGY | Facility: HOSPITAL | Age: 70
Discharge: HOME | End: 2025-04-03
Attending: PHYSICIAN ASSISTANT
Payer: COMMERCIAL

## 2025-04-03 DIAGNOSIS — M17.10 ARTHRITIS OF KNEE: ICD-10-CM

## 2025-04-03 DIAGNOSIS — M17.11 PRIMARY OSTEOARTHRITIS OF RIGHT KNEE: ICD-10-CM

## 2025-04-03 PROCEDURE — 73721 MRI JNT OF LWR EXTRE W/O DYE: CPT | Mod: TC,RT

## 2025-04-03 PROCEDURE — 73721 MRI JNT OF LWR EXTRE W/O DYE: CPT | Mod: 26,RT,, | Performed by: INTERNAL MEDICINE

## 2025-04-03 NOTE — TELEPHONE ENCOUNTER
----- Message from Juli Puckett MD sent at 4/3/2025  8:05 AM CDT -----  Please let patient know that his uric acid level was mildly elevated, and does indicate he likely has gout.  Otherwise his routine blood work was fine.  ----- Message -----  From: Lab, Background User  Sent: 4/2/2025   9:30 PM CDT  To: Juli Puckett MD

## 2025-04-04 ENCOUNTER — TELEPHONE (OUTPATIENT)
Dept: PRIMARY CARE CLINIC | Facility: CLINIC | Age: 70
End: 2025-04-04
Payer: COMMERCIAL

## 2025-04-16 ENCOUNTER — TELEPHONE (OUTPATIENT)
Dept: ORTHOPEDICS | Facility: CLINIC | Age: 70
End: 2025-04-16
Payer: COMMERCIAL

## 2025-04-16 ENCOUNTER — OFFICE VISIT (OUTPATIENT)
Dept: PODIATRY | Facility: CLINIC | Age: 70
End: 2025-04-16
Payer: COMMERCIAL

## 2025-04-16 VITALS
DIASTOLIC BLOOD PRESSURE: 87 MMHG | BODY MASS INDEX: 42.87 KG/M2 | HEART RATE: 69 BPM | WEIGHT: 273.13 LBS | SYSTOLIC BLOOD PRESSURE: 153 MMHG | HEIGHT: 67 IN

## 2025-04-16 DIAGNOSIS — M77.9 CAPSULITIS: ICD-10-CM

## 2025-04-16 DIAGNOSIS — L84 CORN OR CALLUS: ICD-10-CM

## 2025-04-16 DIAGNOSIS — E11.42 TYPE 2 DIABETES MELLITUS WITH DIABETIC POLYNEUROPATHY, UNSPECIFIED WHETHER LONG TERM INSULIN USE: Primary | ICD-10-CM

## 2025-04-16 DIAGNOSIS — M79.671 FOOT PAIN, RIGHT: ICD-10-CM

## 2025-04-16 PROCEDURE — 99999 PR PBB SHADOW E&M-EST. PATIENT-LVL III: CPT | Mod: PBBFAC,,, | Performed by: PODIATRIST

## 2025-04-16 RX ORDER — UREA 40 %
CREAM (GRAM) TOPICAL DAILY
Qty: 85 G | Refills: 11 | Status: SHIPPED | OUTPATIENT
Start: 2025-04-16

## 2025-04-16 RX ORDER — CICLOPIROX 80 MG/ML
SOLUTION TOPICAL NIGHTLY
Qty: 6.6 ML | Refills: 11 | Status: SHIPPED | OUTPATIENT
Start: 2025-04-16

## 2025-04-16 NOTE — PROGRESS NOTES
Subjective:      Patient ID: Jono Gonzalez is a 70 y.o. male.    Chief Complaint: Foot Pain    Diabetes, increased risk amputation needing evaluation/management/optomization of foot care.    Cc2 thick hard callus right forefoot.  Gradual onset, worsening over past several weeks, aggravated by increased weight bearing, shoe gear, pressure.  No previous medical treatment.  OTC pain med not helping. Hx ulcer submet 5 right healed.    Cc3  throbbing pain bottom forefoot right and left.  Chronically present off and on for years.  This exacerbation is Gradual onset, worsening over past several weeks, aggravated by increased weight bearing, shoe gear, pressure.  No previous medical treatment.  OTC pain med not helping. Denies trauma, surgery.    Chief Complaint   Patient presents with    Foot Pain       Casual shoes bot hfeet.    Review of Systems   Constitutional: Negative for chills, diaphoresis, fever, malaise/fatigue and night sweats.   Cardiovascular:  Positive for leg swelling. Negative for claudication, cyanosis and syncope.   Skin:  Positive for nail changes and suspicious lesions. Negative for color change, dry skin, rash and unusual hair distribution.   Musculoskeletal:  Positive for joint pain. Negative for falls, joint swelling, muscle cramps, muscle weakness and stiffness.   Gastrointestinal:  Negative for constipation, diarrhea, nausea and vomiting.   Neurological:  Positive for paresthesias and sensory change. Negative for brief paralysis, disturbances in coordination, focal weakness, numbness and tremors.           Objective:      Physical Exam  Constitutional:       General: He is not in acute distress.     Appearance: He is well-developed. He is not diaphoretic.   Cardiovascular:      Pulses:           Popliteal pulses are 2+ on the right side and 2+ on the left side.        Dorsalis pedis pulses are 2+ on the right side and 2+ on the left side.        Posterior tibial pulses are 2+ on the right side and  2+ on the left side.      Comments: Capillary refill 3 seconds all toes/distal feet, all toes/both feet warm to touch.      Negative lymphadenopathy bilateral popliteal fossa and tarsal tunnel.      Negavie lower extremity edema bilateral.    Musculoskeletal:      Right ankle: No swelling, deformity, ecchymosis or lacerations. Normal range of motion. Normal pulse.      Right Achilles Tendon: Normal. No defects. Bueno's test negative.      Comments: Pain to palpation inferior mtpj 2,3,4 bilateral without evidence of trauma or infection.    Ankle dorsiflexion decreased at <10 degrees bilateral with moderate increase with knee flexion bilateral.    Otherwise, Normal angle, base, station of gait. All ten toes without clubbing, cyanosis, or signs of ischemia.  No pain to palpation bilateral lower extremities.  Range of motion, stability, muscle strength, and muscle tone normal bilateral feet and legs.        Lymphadenopathy:      Lower Body: No right inguinal adenopathy. No left inguinal adenopathy.      Comments: Negative lymphadenopathy bilateral popliteal fossa and tarsal tunnel.    Negative lymphangitic streaking bilateral feet/ankles/legs.   Skin:     General: Skin is warm and dry.      Capillary Refill: Capillary refill takes 2 to 3 seconds.      Coloration: Skin is not pale.      Findings: No abrasion, bruising, burn, ecchymosis, erythema, laceration, lesion or rash.      Nails: There is no clubbing.      Comments: Focal hyperkeratotic lesion consisting entirely of hyperkeratotic tissue without open skin, drainage, pus, fluctuance, malodor, or signs of infection beneath right 5th mtpj.    Otherwise, Skin is normal age and health appropriate color, turgor, texture, and temperature bilateral lower extremities without ulceration, hyperpigmentation, discoloration, masses nodules or cords palpated.  No ecchymosis, erythema, edema, or cardinal signs of infection bilateral lower extremities.    Toenails 1st, 2nd, 3rd,  4th, 5th  bilateral are hypertrophic, dystrophic, discolored tanish brown with tan, gray crumbly subungual debris.  Tender to distal nail plate pressure, without periungual skin abnormality of each.         Neurological:      Mental Status: He is alert and oriented to person, place, and time.      Sensory: No sensory deficit.      Motor: No tremor, atrophy or abnormal muscle tone.      Gait: Gait normal.      Comments: Negative tinel sign to percussion sural, superficial peroneal, deep peroneal, saphenous, and posterior tibial nerves right and left ankles and feet.    Paresthesias, and burning intermitetntly bilateral feet with no clearly identified trigger or source.     Psychiatric:         Behavior: Behavior is cooperative.           Assessment:       Encounter Diagnoses   Name Primary?    Type 2 diabetes mellitus with diabetic polyneuropathy, unspecified whether long term insulin use Yes    Corn or callus     Capsulitis     Foot pain, right          Plan:       Jono was seen today for foot pain.    Diagnoses and all orders for this visit:    Type 2 diabetes mellitus with diabetic polyneuropathy, unspecified whether long term insulin use    Corn or callus    Capsulitis    Foot pain, right    Other orders  -     urea (CARMOL) 40 % Crea; Apply topically once daily.  -     ciclopirox (PENLAC) 8 % Soln; Apply topically nightly.      I counseled the patient on his conditions, their implications and medical management.        The patient has received literature on basic diabetic foot care.  Patient will inspect feet daily, wear protective shoe gear when ambulatory, and apply moisturizer to skin as needed to maintain elasticity and help prevent ulceration.    Inspect feet multiple times daily for signs of occurrence/recurrence ulceration.    Discussed conservative treatment with shoes of adequate dimensions, material, and style to alleviate symptoms and delay or prevent surgical intervention.    Urea, penlac, DM  shoes, inserts    With the patient's permission, I debrided all ten toenails with a sterile nipper and curette, removing all offending nail and debris.  Patient tolerated the procedure well and related significant relief.    With the patient's permission, I debrided hyperkeratotic lesion(s) as above totaling      1          to, not  Including dermis with sterile #15 blade.  Patient tolerated the procedure well and related significant relief.           Follow up in about 1 year (around 4/16/2026).

## 2025-04-16 NOTE — TELEPHONE ENCOUNTER
Tried calling patient no answer left message on voice mail regarding scheduling a orthopedics appointment

## 2025-04-17 ENCOUNTER — PATIENT MESSAGE (OUTPATIENT)
Dept: PODIATRY | Facility: CLINIC | Age: 70
End: 2025-04-17
Payer: COMMERCIAL

## 2025-04-25 ENCOUNTER — OFFICE VISIT (OUTPATIENT)
Dept: PRIMARY CARE CLINIC | Facility: CLINIC | Age: 70
End: 2025-04-25
Payer: COMMERCIAL

## 2025-04-25 ENCOUNTER — CLINICAL SUPPORT (OUTPATIENT)
Dept: PRIMARY CARE CLINIC | Facility: CLINIC | Age: 70
End: 2025-04-25
Attending: FAMILY MEDICINE
Payer: COMMERCIAL

## 2025-04-25 VITALS
HEIGHT: 67 IN | DIASTOLIC BLOOD PRESSURE: 80 MMHG | BODY MASS INDEX: 42.18 KG/M2 | HEART RATE: 72 BPM | SYSTOLIC BLOOD PRESSURE: 134 MMHG | WEIGHT: 268.75 LBS | OXYGEN SATURATION: 98 %

## 2025-04-25 DIAGNOSIS — Z23 ENCOUNTER FOR ADMINISTRATION OF VACCINE: ICD-10-CM

## 2025-04-25 DIAGNOSIS — E11.9 TYPE 2 DIABETES MELLITUS WITHOUT COMPLICATION, WITHOUT LONG-TERM CURRENT USE OF INSULIN: Primary | ICD-10-CM

## 2025-04-25 DIAGNOSIS — E11.42 TYPE 2 DIABETES MELLITUS WITH DIABETIC POLYNEUROPATHY, UNSPECIFIED WHETHER LONG TERM INSULIN USE: ICD-10-CM

## 2025-04-25 DIAGNOSIS — G57.93 NEUROPATHY INVOLVING BOTH LOWER EXTREMITIES: ICD-10-CM

## 2025-04-25 DIAGNOSIS — E78.2 MIXED HYPERLIPIDEMIA: ICD-10-CM

## 2025-04-25 DIAGNOSIS — E66.01 SEVERE OBESITY: ICD-10-CM

## 2025-04-25 DIAGNOSIS — M10.9 GOUTY ARTHROPATHY: ICD-10-CM

## 2025-04-25 DIAGNOSIS — N52.9 ERECTILE DYSFUNCTION, UNSPECIFIED ERECTILE DYSFUNCTION TYPE: ICD-10-CM

## 2025-04-25 DIAGNOSIS — H91.93 HEARING DIFFICULTY OF BOTH EARS: ICD-10-CM

## 2025-04-25 DIAGNOSIS — Z12.5 PROSTATE CANCER SCREENING: ICD-10-CM

## 2025-04-25 DIAGNOSIS — F11.20 NARCOTIC DEPENDENCE: ICD-10-CM

## 2025-04-25 PROCEDURE — 3008F BODY MASS INDEX DOCD: CPT | Mod: CPTII,S$GLB,, | Performed by: FAMILY MEDICINE

## 2025-04-25 PROCEDURE — 90471 IMMUNIZATION ADMIN: CPT | Mod: S$GLB,,, | Performed by: FAMILY MEDICINE

## 2025-04-25 PROCEDURE — 99999 PR PBB SHADOW E&M-EST. PATIENT-LVL V: CPT | Mod: PBBFAC,,, | Performed by: FAMILY MEDICINE

## 2025-04-25 PROCEDURE — 99214 OFFICE O/P EST MOD 30 MIN: CPT | Mod: 25,S$GLB,, | Performed by: FAMILY MEDICINE

## 2025-04-25 PROCEDURE — 90714 TD VACC NO PRESV 7 YRS+ IM: CPT | Mod: S$GLB,,, | Performed by: FAMILY MEDICINE

## 2025-04-25 PROCEDURE — 1160F RVW MEDS BY RX/DR IN RCRD: CPT | Mod: CPTII,S$GLB,, | Performed by: FAMILY MEDICINE

## 2025-04-25 PROCEDURE — 3075F SYST BP GE 130 - 139MM HG: CPT | Mod: CPTII,S$GLB,, | Performed by: FAMILY MEDICINE

## 2025-04-25 PROCEDURE — 3079F DIAST BP 80-89 MM HG: CPT | Mod: CPTII,S$GLB,, | Performed by: FAMILY MEDICINE

## 2025-04-25 PROCEDURE — 4010F ACE/ARB THERAPY RXD/TAKEN: CPT | Mod: CPTII,S$GLB,, | Performed by: FAMILY MEDICINE

## 2025-04-25 PROCEDURE — 1125F AMNT PAIN NOTED PAIN PRSNT: CPT | Mod: CPTII,S$GLB,, | Performed by: FAMILY MEDICINE

## 2025-04-25 PROCEDURE — 1159F MED LIST DOCD IN RCRD: CPT | Mod: CPTII,S$GLB,, | Performed by: FAMILY MEDICINE

## 2025-04-25 PROCEDURE — 3288F FALL RISK ASSESSMENT DOCD: CPT | Mod: CPTII,S$GLB,, | Performed by: FAMILY MEDICINE

## 2025-04-25 PROCEDURE — 1101F PT FALLS ASSESS-DOCD LE1/YR: CPT | Mod: CPTII,S$GLB,, | Performed by: FAMILY MEDICINE

## 2025-04-25 RX ORDER — ALLOPURINOL 300 MG/1
300 TABLET ORAL DAILY
Qty: 90 TABLET | Refills: 3 | Status: SHIPPED | OUTPATIENT
Start: 2025-04-25

## 2025-04-25 RX ORDER — PREGABALIN 100 MG/1
100 CAPSULE ORAL 2 TIMES DAILY
Qty: 90 CAPSULE | Refills: 3 | Status: SHIPPED | OUTPATIENT
Start: 2025-04-25

## 2025-04-25 RX ORDER — COLCHICINE 0.6 MG/1
TABLET ORAL
Qty: 90 TABLET | Refills: 3 | Status: SHIPPED | OUTPATIENT
Start: 2025-04-25

## 2025-04-25 RX ORDER — ATORVASTATIN CALCIUM 40 MG/1
40 TABLET, FILM COATED ORAL DAILY
Qty: 90 TABLET | Refills: 3 | Status: SHIPPED | OUTPATIENT
Start: 2025-04-25

## 2025-04-25 RX ORDER — SILDENAFIL 100 MG/1
100 TABLET, FILM COATED ORAL DAILY PRN
Qty: 20 TABLET | Refills: 3 | Status: SHIPPED | OUTPATIENT
Start: 2025-04-25 | End: 2026-04-25

## 2025-04-25 NOTE — PROGRESS NOTES
Jono Gonzalez is a 70 y.o. male here for a diabetic eye screening with non-dilated fundus photos per .    Patient cooperative?: Yes  Small pupils?: No  Last eye exam: N/A    For exam results, see Encounter Report.

## 2025-04-27 NOTE — PROGRESS NOTES
Clinic Note  2025      Subjective:       Patient ID:  Jono is a 70 y.o. male being seen for:      History of Present Illness    CHIEF COMPLAINT:  Jono presents today for follow up of right knee pain    MUSCULOSKELETAL - RIGHT KNEE:  He reports ongoing right knee issues with popping sounds. He was provided a brace and walker by orthopedist, though he currently uses a cane for ambulation. X-ray showed evidence of a chipped bone. He previously underwent physical therapy when symptoms initially began.    PODIATRY:  He recently had a painful callus on right foot debrided by podiatrist with symptomatic relief. His left toenail was completely removed due to severe fungal infection. He was prescribed salicylic acid cream for toenail treatment.    GOUT:  He ran out of allopurinol approximately one month ago but reports no gout flares in a long time. He takes colchicine as needed for flares.    CURRENT MEDICATIONS:  He takes Ozempic at maximum dose for weight loss and diabetes management, Lyrica daily for leg pain, Lasix for blood pressure management, and Viagra as needed.    HEARING:  He inherited hearing aids from his  mother but has difficulty using them due to inability to properly adjust the volume, finding them excessively loud.      ROS:  General: -fever, -chills, -fatigue, -weight gain, -weight loss  Eyes: -vision changes, -redness, -discharge  ENT: -ear pain, -nasal congestion, -sore throat, +difficulty hearing  Cardiovascular: -chest pain, -palpitations, -lower extremity edema  Respiratory: -cough, -shortness of breath  Gastrointestinal: -abdominal pain, -nausea, -vomiting, -diarrhea, -constipation, -blood in stool  Genitourinary: -dysuria, -hematuria, -frequency  Musculoskeletal: +joint pain, -muscle pain  Skin: -rash, -lesion  Neurological: -headache, -dizziness, -numbness, -tingling  Psychiatric: -anxiety, -depression, -sleep difficulty           Medication List with Changes/Refills   New  Medications    TIRZEPATIDE 7.5 MG/0.5 ML PNIJ    Inject 7.5 mg into the skin every 7 days.   Current Medications    ASPIRIN (ECOTRIN) 81 MG EC TABLET    Take 81 mg by mouth once daily.    CICLOPIROX (PENLAC) 8 % SOLN    Apply topically to all nails once nightly    FUROSEMIDE (LASIX) 40 MG TABLET    Take 1 tablet (40 mg total) by mouth once daily.    HYDROCODONE-ACETAMINOPHEN (NORCO)  MG PER TABLET    Take 1 tablet by mouth.    HYDROCODONE-ACETAMINOPHEN (NORCO) 5-325 MG PER TABLET    Take 1 tablet by mouth every 6 (six) hours as needed for Pain.    HYDROCODONE-ACETAMINOPHEN 5-325MG (NORCO) 5-325 MG PER TABLET        IBUPROFEN (ADVIL,MOTRIN) 600 MG TABLET    TAKE 1 TABLET(600 MG) BY MOUTH EVERY 6 HOURS WITH FOOD AS NEEDED FOR PAIN OR INFLAMMATION    METFORMIN (GLUCOPHAGE-XR) 500 MG ER 24HR TABLET    Take 1 tablet (500 mg total) by mouth 2 (two) times daily with meals.    NAPROXEN (NAPROSYN) 500 MG TABLET    Take 500 mg by mouth 2 (two) times daily as needed.    OLMESARTAN (BENICAR) 40 MG TABLET    Take 1 tablet (40 mg total) by mouth once daily. Blood pressure    TAMSULOSIN (FLOMAX) 0.4 MG CAP    Take 1 capsule (0.4 mg total) by mouth every morning.    UREA (CARMOL) 40 % CREA    Apply topically once daily.   Changed and/or Refilled Medications    Modified Medication Previous Medication    ALLOPURINOL (ZYLOPRIM) 300 MG TABLET allopurinoL (ZYLOPRIM) 300 MG tablet       Take 1 tablet (300 mg total) by mouth once daily. Gout prevention    Take 1 tablet (300 mg total) by mouth once daily.    ATORVASTATIN (LIPITOR) 40 MG TABLET atorvastatin (LIPITOR) 40 MG tablet       Take 1 tablet (40 mg total) by mouth once daily. High cholesterol    Take 1 tablet (40 mg total) by mouth once daily. High cholesterol    COLCHICINE (COLCRYS) 0.6 MG TABLET colchicine (COLCRYS) 0.6 mg tablet       Take 1 tablet daily for 3 months, then as needed for gout flares    Take 2 tablets by mouth at the first sign of flare, followed by 0.6 mg  "after 1 hour. THEN 1 tablet twice daily    PREGABALIN (LYRICA) 100 MG CAPSULE pregabalin (LYRICA) 100 MG capsule       Take 1 capsule (100 mg total) by mouth 2 (two) times daily. Nerve pain    Take 1 capsule (100 mg total) by mouth 3 (three) times daily.    SILDENAFIL (VIAGRA) 100 MG TABLET sildenafiL (VIAGRA) 100 MG tablet       Take 1 tablet (100 mg total) by mouth daily as needed for Erectile Dysfunction (take on an empty stomach 1 hour prior to sex).    Take 1 tablet (100 mg total) by mouth daily as needed for Erectile Dysfunction (take on an empty stomach 1 hour prior to sex).   Discontinued Medications    SEMAGLUTIDE (OZEMPIC) 2 MG/DOSE (8 MG/3 ML) PNIJ    Inject 2 mg into the skin every 7 days.       Problem List[1]        Objective:      /80 (BP Location: Left arm, Patient Position: Sitting)   Pulse 72   Ht 5' 7" (1.702 m)   Wt 121.9 kg (268 lb 11.9 oz)   SpO2 98%   BMI 42.09 kg/m²       Physical Exam    General: No acute distress. Well-developed. Well-nourished.  Eyes: EOMI. Sclerae anicteric.  HENT: Normocephalic. Atraumatic. Nares patent. Moist oral mucosa.  Cardiovascular: Regular rate. Regular rhythm. No murmurs. No rubs. No gallops. Normal S1, S2.  Respiratory: Normal respiratory effort. Clear to auscultation bilaterally. No rales. No rhonchi. No wheezing.  Musculoskeletal: No  obvious deformity.  Extremities: No lower extremity edema.  Neurological: Alert & oriented x3. No slurred speech. Normal gait.  Psychiatric: Normal mood. Normal affect. Good insight. Good judgment.  Skin: Warm. Dry. No rash.           Assessment and Plan:     Assessment & Plan    - Considered switching from Ozempic to Mounjaro for potentially better weight loss benefits and appetite suppression.  - Assessed need for prostate level check due to history of prostate cancer.  - Evaluated hearing aid options, considering OTC alternatives as a potentially effective and more affordable option compared to prescription hearing " aids.  - Consider trying mother's hearing aids with assistance from an audiologist for adjustment.    SEVERE OBESITY:  - Observed weight loss of at least 14 lbs since the last visit.  - Discussed the importance of lifestyle changes for weight management and healing, emphasizing that patients can regain weight if they revert to old eating habits despite medication.  - Recommend switching from Ozempic 2mg to Mounjaro 7.5mg weekly injection for better weight loss benefits.  - Explained the gradual dose increase plan for Mounjaro to minimize side effects and initiated the 7.5 mg injection once weekly.  - Instructed the patient to indicate readiness for a higher dose in the veronica's note section when requesting a refill.    TYPE 2 DIABETES MELLITUS:  - Acknowledged controlled diabetes.  - Plan to increase Mounjaro dose monthly, potentially up to 15mg (max dose).  - Ordered labs to check diabetes levels and retinal imaging in 3 weeks.    HYPERTENSION:  - Blood pressure looks good today.  - Continued atorvastatin.  - Confirmed patient has refills remaining for blood pressure medication.    KNEE PAIN:  - Jono reports ongoing knee pain and difficulty walking, uses a cane especially when going to [place].  - X-ray shows chipped bone in knee.  - Discussed potential knee replacement surgery; patient reluctant.  - Orthopedist provided knee brace and walker to prevent falling.    GOUT:  - Jono ran out of allopurinol for about a month, leading to high uric acid levels confirmed by recent test.  - Explained allopurinol's function in preventing gout and lowering uric acid.  - Restarted allopurinol 300 mg daily with 3-month supply and year-long refills.  - Prescribed colchicine 0.6 mg, 1 tablet daily for 3 months, then as needed for gout flares.  - Explained potential for initial gout flares when restarting allopurinol.  - Ordered labs to check uric acid levels in 3 weeks.    TOENAIL FUNGUS:  - Foot specialist observed fungal  infection in toenails.  - Prescribed salicylic acid cream for treatment.  - Instructed patient to apply after showering and wear socks afterward.    FOOT CALLUS:  - Jono had painful callus on right foot, which foot specialist shaved down.  - Recommend consistent use of salicylic acid to prevent recurrence.    HEARING LOSS:  - Jono has been rescheduling and canceling hearing check appointments.  - Discussed OTC hearing aids as potential option.  - Referred patient to audiologist for hearing aid adjustment.    CHRONIC PAIN:  - Continued Lyrica 100mg daily for pain management.    FOLLOW-UP AND ADDITIONAL INFORMATION:  - Follow up in 3 months.  - Blood draw in 3 weeks (lab orders placed for diabetes, uric acid, and prostate levels).  - Jono can come for blood draw any weekday (closed 12:00 to 1:00 for lunch).  - Continued Viagra (as needed).  - Provided information on tetanus vaccine (every 10 years); administered in office.         Follow Up:   Follow up in about 3 months (around 7/25/2025) for follow-up.    Other Orders Placed This Visit:  Orders Placed This Encounter    PSA, Screening    Hemoglobin A1C    Uric Acid    Ambulatory referral/consult to Audiology    tirzepatide 7.5 mg/0.5 mL PnIj    allopurinoL (ZYLOPRIM) 300 MG tablet    colchicine (COLCRYS) 0.6 mg tablet    atorvastatin (LIPITOR) 40 MG tablet    sildenafiL (VIAGRA) 100 MG tablet    pregabalin (LYRICA) 100 MG capsule    Td (Tenivac) IM vaccine (>/= 6 yo)         Jim Linn MD        This note is dictated on M*Modal word recognition program and This note was generated with the assistance of ambient listening technology. Verbal consent was obtained by the patient and accompanying visitor(s) for the recording of patient appointment to facilitate this note. I attest to having reviewed and edited the generated note for accuracy, though some syntax or spelling errors may persist. Please contact the author of this note for any clarification.  .  There  are word recognition mistakes that are occasionally missed on review.         [1]   Patient Active Problem List  Diagnosis    Hypertension    BPH with obstruction/lower urinary tract symptoms    Chronic venous insufficiency    Gouty arthropathy    HLD (hyperlipidemia)    DAVID (obstructive sleep apnea)    Type 2 diabetes mellitus with diabetic polyneuropathy, unspecified whether long term insulin use    RBBB    Wheezing    Narcotic dependence    Severe obesity    Male hypogonadism    Chronic back pain    Acute pain of left knee    History of lumbar spinal fusion    Lymphedema of both lower extremities    Cellulitis of left lower extremity

## 2025-07-07 ENCOUNTER — TELEPHONE (OUTPATIENT)
Dept: PRIMARY CARE CLINIC | Facility: CLINIC | Age: 70
End: 2025-07-07
Payer: COMMERCIAL

## 2025-07-07 ENCOUNTER — LAB VISIT (OUTPATIENT)
Dept: PRIMARY CARE CLINIC | Facility: CLINIC | Age: 70
End: 2025-07-07
Payer: MEDICARE

## 2025-07-07 DIAGNOSIS — E11.9 TYPE 2 DIABETES MELLITUS WITHOUT COMPLICATION, WITHOUT LONG-TERM CURRENT USE OF INSULIN: ICD-10-CM

## 2025-07-07 DIAGNOSIS — Z12.5 PROSTATE CANCER SCREENING: ICD-10-CM

## 2025-07-07 DIAGNOSIS — M10.9 GOUTY ARTHROPATHY: ICD-10-CM

## 2025-07-07 PROCEDURE — 84550 ASSAY OF BLOOD/URIC ACID: CPT

## 2025-07-07 PROCEDURE — 84153 ASSAY OF PSA TOTAL: CPT

## 2025-07-07 PROCEDURE — 83036 HEMOGLOBIN GLYCOSYLATED A1C: CPT

## 2025-07-07 NOTE — TELEPHONE ENCOUNTER
Copied from CRM #[id number]. Topic: General Inquiry - Patient Advice  >> Jul 7, 2025  3:52 PM Jane wrote:  Patient said that he will be another five minutes late for his appointment on today.

## 2025-07-08 DIAGNOSIS — E11.9 TYPE 2 DIABETES MELLITUS WITHOUT COMPLICATION, WITHOUT LONG-TERM CURRENT USE OF INSULIN: ICD-10-CM

## 2025-07-08 DIAGNOSIS — E66.01 SEVERE OBESITY: ICD-10-CM

## 2025-07-08 LAB
EAG (OHS): 111 MG/DL (ref 68–131)
HBA1C MFR BLD: 5.5 % (ref 4–5.6)
PSA SERPL-MCNC: 0.97 NG/ML
URATE SERPL-MCNC: 4.4 MG/DL (ref 3.4–7)

## 2025-07-08 RX ORDER — TIRZEPATIDE 7.5 MG/.5ML
7.5 INJECTION, SOLUTION SUBCUTANEOUS
Qty: 2 ML | Refills: 1 | Status: SHIPPED | OUTPATIENT
Start: 2025-07-08

## 2025-07-08 NOTE — TELEPHONE ENCOUNTER
Care Due:                  Date            Visit Type   Department     Provider  --------------------------------------------------------------------------------                                EP -                              PRIMARY      [facility] PRIMARY  Last Visit: 04-      CARE ([facility])   CARE           Jim Linn                              EP -                              PRIMARY      [facility] PRIMARY  Next Visit: 07-      CARE ([facility])   [facility]           Jim Linn                                                            Last  Test          Frequency    Reason                     Performed    Due Date  --------------------------------------------------------------------------------    HBA1C.......  6 months...  metFORMIN, tirzepatide...  11- 05-    [facility] Embedded Care Due Messages. Reference number: [id number].   7/08/2025 1:27:00 PM CDT

## 2025-07-16 ENCOUNTER — OFFICE VISIT (OUTPATIENT)
Dept: OPTOMETRY | Facility: CLINIC | Age: 70
End: 2025-07-16
Payer: MEDICARE

## 2025-07-16 DIAGNOSIS — E11.36 TYPE 2 DIABETES MELLITUS WITH CATARACT: ICD-10-CM

## 2025-07-16 DIAGNOSIS — H52.203 HYPEROPIA WITH ASTIGMATISM AND PRESBYOPIA, BILATERAL: ICD-10-CM

## 2025-07-16 DIAGNOSIS — E11.9 TYPE 2 DIABETES MELLITUS WITHOUT RETINOPATHY: Primary | ICD-10-CM

## 2025-07-16 DIAGNOSIS — H52.03 HYPEROPIA WITH ASTIGMATISM AND PRESBYOPIA, BILATERAL: ICD-10-CM

## 2025-07-16 DIAGNOSIS — H25.13 SENILE NUCLEAR SCLEROSIS, BILATERAL: ICD-10-CM

## 2025-07-16 DIAGNOSIS — H52.4 HYPEROPIA WITH ASTIGMATISM AND PRESBYOPIA, BILATERAL: ICD-10-CM

## 2025-07-16 PROCEDURE — 92014 COMPRE OPH EXAM EST PT 1/>: CPT | Mod: S$PBB,,, | Performed by: OPTOMETRIST

## 2025-07-16 PROCEDURE — 99999 PR PBB SHADOW E&M-EST. PATIENT-LVL II: CPT | Mod: PBBFAC,,, | Performed by: OPTOMETRIST

## 2025-07-16 PROCEDURE — 99212 OFFICE O/P EST SF 10 MIN: CPT | Mod: PBBFAC,PO | Performed by: OPTOMETRIST

## 2025-07-16 NOTE — PROGRESS NOTES
GLORIA    TESS: 07/22  Chief complaint (CC): Patient is here for annual eye exam today.  Wears   OTC readers for near, seems to work fine.  Distance vision seems fine   without glasses.  Glasses? +3.25 OTC  Contacts? no  H/o eye surgery, injections or laser: no  H/o eye injury: no  Known eye conditions? Cataracts, presbyopia  Family h/o eye conditions? no  Eye gtts? no      (-) Flashes (-)  Floaters (-) Mucous   (-)  Tearing (-) Itching (-) Burning   (-) Headaches (-) Eye Pain/discomfort (-) Irritation   (-)  Redness (-) Double vision (-) Blurry vision    Diabetic? +  A1c? Hemoglobin A1C       Date                     Value               Ref Range             Status                11/05/2024               5.7 (H)             4.0 - 5.6 %           Final                 04/01/2024               5.5                 4.0 - 5.6 %           Final                 09/20/2022               5.8 (H)             4.0 - 5.6 %           Final                 07/07/2025               5.5                 4.0 - 5.6 %           Final                  Last edited by Melody Sanders on 7/16/2025  3:46 PM.            Assessment /Plan     For exam results, see Encounter Report.      Type 2 diabetes mellitus without retinopathy  BS control. No signs of diabetic retinopathy. Monitor with annual exam.    Senile nuclear sclerosis, bilateral  Type 2 diabetes mellitus with cataract  Nuclear sclerotic cataract - not visually significant. Observe.    Hyperopia with astigmatism and presbyopia, bilateral  Cont OTC readers. Normal ocular health. RTC 1 year for routine exam.

## 2025-07-25 ENCOUNTER — OFFICE VISIT (OUTPATIENT)
Dept: PRIMARY CARE CLINIC | Facility: CLINIC | Age: 70
End: 2025-07-25
Payer: COMMERCIAL

## 2025-07-25 VITALS
OXYGEN SATURATION: 97 % | RESPIRATION RATE: 18 BRPM | BODY MASS INDEX: 41.71 KG/M2 | HEIGHT: 67 IN | WEIGHT: 265.75 LBS | HEART RATE: 76 BPM | SYSTOLIC BLOOD PRESSURE: 130 MMHG | DIASTOLIC BLOOD PRESSURE: 76 MMHG

## 2025-07-25 DIAGNOSIS — K59.03 OPIOID-INDUCED CONSTIPATION: Primary | ICD-10-CM

## 2025-07-25 DIAGNOSIS — T40.2X5A OPIOID-INDUCED CONSTIPATION: Primary | ICD-10-CM

## 2025-07-25 PROCEDURE — 99213 OFFICE O/P EST LOW 20 MIN: CPT | Mod: S$PBB,,, | Performed by: FAMILY MEDICINE

## 2025-07-25 PROCEDURE — 99999 PR PBB SHADOW E&M-EST. PATIENT-LVL IV: CPT | Mod: PBBFAC,,, | Performed by: FAMILY MEDICINE

## 2025-07-25 PROCEDURE — 99214 OFFICE O/P EST MOD 30 MIN: CPT | Mod: PBBFAC,PN | Performed by: FAMILY MEDICINE

## 2025-07-27 NOTE — PROGRESS NOTES
Clinic Note  7/27/2025      Subjective:       Patient ID:  Jono is a 70 y.o. male being seen for:      History of Present Illness    CHIEF COMPLAINT:  Jono presents today for constipation and urinary retention.    CONSTIPATION AND URINARY RETENTION:  He experienced severe constipation and urinary retention last month with significant straining to urinate and defecate, particularly around 2-3 AM, which caused discomfort. He noticed blood in stool due to excessive straining. His wife provided stool softener and Dulcolax to help manage symptoms. He currently reports intermittent difficulty with bowel movements but is able to urinate. He denies ongoing complete urinary or bowel obstruction and reports overall improvement from previous month's severe symptoms, though continues to experience some straining during defecation. He previously had regular morning bowel movements but now experiences difficulty since restarting hydrocodone. He drinks four bowls of water daily and desires soft, non-straining bowel movements.    MEDICATIONS:  He recently restarted hydrocodone 5 mg 2-3 times daily as needed by his pain management physician after being off the medication for 3-4 months. He also takes Lasix daily. He reports new onset of constipation since restarting hydrocodone and acknowledges constipation as a potential side effect of the pain medication. He is aware of potential need for laxatives to manage medication-induced constipation.      ROS:  General: -fever, -chills, -fatigue, -weight gain, -weight loss  Eyes: -vision changes, -redness, -discharge  ENT: -ear pain, -nasal congestion, -sore throat  Cardiovascular: -chest pain, -palpitations, -lower extremity edema  Respiratory: -cough, -shortness of breath  Gastrointestinal: +abdominal pain, -nausea, -vomiting, -diarrhea, +constipation, +blood in stool, +rectal bleeding, +bright red blood per rectum  Genitourinary: -dysuria, -hematuria, -frequency  Musculoskeletal:  -joint pain, -muscle pain  Skin: -rash, -lesion  Neurological: -headache, -dizziness, -numbness, -tingling  Psychiatric: -anxiety, -depression, -sleep difficulty           Medication List with Changes/Refills   Current Medications    ALLOPURINOL (ZYLOPRIM) 300 MG TABLET    Take 1 tablet (300 mg total) by mouth once daily. Gout prevention    ASPIRIN (ECOTRIN) 81 MG EC TABLET    Take 81 mg by mouth once daily.    ATORVASTATIN (LIPITOR) 40 MG TABLET    Take 1 tablet (40 mg total) by mouth once daily. High cholesterol    CICLOPIROX (PENLAC) 8 % SOLN    Apply topically to all nails once nightly    COLCHICINE (COLCRYS) 0.6 MG TABLET    Take 1 tablet daily for 3 months, then as needed for gout flares    FUROSEMIDE (LASIX) 40 MG TABLET    Take 1 tablet (40 mg total) by mouth once daily.    HYDROCODONE-ACETAMINOPHEN (NORCO)  MG PER TABLET    Take 1 tablet by mouth.    HYDROCODONE-ACETAMINOPHEN (NORCO) 5-325 MG PER TABLET    Take 1 tablet by mouth every 6 (six) hours as needed for Pain.    HYDROCODONE-ACETAMINOPHEN 5-325MG (NORCO) 5-325 MG PER TABLET        IBUPROFEN (ADVIL,MOTRIN) 600 MG TABLET    TAKE 1 TABLET(600 MG) BY MOUTH EVERY 6 HOURS WITH FOOD AS NEEDED FOR PAIN OR INFLAMMATION    METFORMIN (GLUCOPHAGE-XR) 500 MG ER 24HR TABLET    Take 1 tablet (500 mg total) by mouth 2 (two) times daily with meals.    NAPROXEN (NAPROSYN) 500 MG TABLET    Take 500 mg by mouth 2 (two) times daily as needed.    OLMESARTAN (BENICAR) 40 MG TABLET    Take 1 tablet (40 mg total) by mouth once daily. Blood pressure    PREGABALIN (LYRICA) 100 MG CAPSULE    Take 1 capsule (100 mg total) by mouth 2 (two) times daily. Nerve pain    SILDENAFIL (VIAGRA) 100 MG TABLET    Take 1 tablet (100 mg total) by mouth daily as needed for Erectile Dysfunction (take on an empty stomach 1 hour prior to sex).    TAMSULOSIN (FLOMAX) 0.4 MG CAP    Take 1 capsule (0.4 mg total) by mouth every morning.    TIRZEPATIDE (MOUNJARO) 7.5 MG/0.5 ML PNIJ    Inject  "7.5 mg into the skin every 7 days.    UREA (CARMOL) 40 % CREA    Apply topically once daily.       Problem List[1]        Objective:      /76 (BP Location: Left arm, Patient Position: Sitting)   Pulse 76   Resp 18   Ht 5' 7" (1.702 m)   Wt 120.5 kg (265 lb 12.2 oz)   SpO2 97%   BMI 41.62 kg/m²       Physical Exam    General: No acute distress. Well-developed. Well-nourished.  Eyes: EOMI. Sclerae anicteric.  HENT: Normocephalic. Atraumatic. Nares patent. Moist oral mucosa.  Cardiovascular: Regular rate. Regular rhythm. No murmurs. No rubs. No gallops. Normal S1, S2.  Respiratory: Normal respiratory effort. Clear to auscultation bilaterally. No rales. No rhonchi. No wheezing.  Musculoskeletal: No  obvious deformity.  Extremities: No lower extremity edema.  Neurological: Alert & oriented x3. No slurred speech. Normal gait.  Psychiatric: Normal mood. Normal affect. Good insight. Good judgment.  Skin: Warm. Dry. No rash.           Assessment and Plan:     Assessment & Plan    - Assessed recent constipation and urinary retention, likely due to restarting hydrocodone.  - Evaluated current Mounjaro dosage, deciding against increase due to potential constipation side effect.  - Reviewed recent lab results, noting normal prostate levels, well-controlled glucose (average 111), and healthy uric acid levels (4.4).  - Considered current pain management regimen and its impact on bowel function.    ## DRUG-INDUCED CONSTIPATION:  - Confirmed patient reports constipation after restarting hydrocodone (Norco), taking 2-3 pills daily as needed for the past month.  - Jono experiences straining and occasional blood due to constipation.  - Educated patient that constipation is a known side effect of opiates, and informed about the potential constipating effects of Mounjaro as well.  - Recommend daily laxative regimen while taking hydrocodone: Option 1: Miralax powder mixed in drink daily (previously used as colonoscopy prep) " or Option 2: Dulcolax up to 15 mg (3 tablets) daily.  - Advised to increase water intake and consider using a Squatty Potty to improve bowel movement positioning.  - Explained how significant constipation can lead to urinary retention.    ## URINARY RETENTION:  - Jono experienced urinary retention when constipated or due to Norco. Resolved    ## GASTROINTESTINAL HEMORRHAGE:  - Jono reports seeing blood due to straining from constipation.  - This is being addressed through the constipation management plan.    ## RECENT RESTARTING OF OPIATE ANALGESIC USE:  - Implemented daily laxative regimen as described above to manage constipation side effects while patient continues hydrocodone.    ## FOLLOW-UP:  - Follow up in 6 months (January).  - Jono advised to contact the office if any issues arise before the next scheduled appointment.         Follow Up:   Follow up in about 6 months (around 1/25/2026) for follow-up.    Other Orders Placed This Visit:         Jim Linn MD        This note is dictated on [employer] word recognition program and This note was generated with the assistance of ambient listening technology. Verbal consent was obtained by the patient and accompanying visitor(s) for the recording of patient appointment to facilitate this note. I attest to having reviewed and edited the generated note for accuracy, though some syntax or spelling errors may persist. Please contact the author of this note for any clarification.  .  There are word recognition mistakes that are occasionally missed on review.         [1]   Patient Active Problem List  Diagnosis    Hypertension    BPH with obstruction/lower urinary tract symptoms    Chronic venous insufficiency    Gouty arthropathy    HLD (hyperlipidemia)    DAVID (obstructive sleep apnea)    Type 2 diabetes mellitus with diabetic polyneuropathy, unspecified whether long term insulin use    RBBB    Wheezing    Narcotic dependence    Severe obesity    Male  hypogonadism    Chronic back pain    Acute pain of left knee    History of lumbar spinal fusion    Lymphedema of both lower extremities    Cellulitis of left lower extremity

## 2025-08-13 ENCOUNTER — PATIENT MESSAGE (OUTPATIENT)
Dept: ADMINISTRATIVE | Facility: OTHER | Age: 70
End: 2025-08-13
Payer: COMMERCIAL